# Patient Record
Sex: FEMALE | Race: WHITE | Employment: OTHER | URBAN - NONMETROPOLITAN AREA
[De-identification: names, ages, dates, MRNs, and addresses within clinical notes are randomized per-mention and may not be internally consistent; named-entity substitution may affect disease eponyms.]

---

## 2017-03-27 ENCOUNTER — TELEPHONE (OUTPATIENT)
Dept: PRIMARY CARE CLINIC | Age: 67
End: 2017-03-27

## 2017-03-27 RX ORDER — ONDANSETRON 4 MG/1
TABLET, FILM COATED ORAL
Qty: 30 TABLET | Refills: 1 | Status: SHIPPED | OUTPATIENT
Start: 2017-03-27 | End: 2019-07-25

## 2017-03-27 RX ORDER — MECLIZINE HYDROCHLORIDE 25 MG/1
TABLET ORAL
Qty: 30 TABLET | Refills: 5 | Status: SHIPPED | OUTPATIENT
Start: 2017-03-27 | End: 2018-01-04

## 2017-04-03 RX ORDER — BUTALBITAL, ACETAMINOPHEN AND CAFFEINE 50; 325; 40 MG/1; MG/1; MG/1
TABLET ORAL
Qty: 30 TABLET | Refills: 0 | Status: SHIPPED | OUTPATIENT
Start: 2017-04-03 | End: 2017-05-03 | Stop reason: SDUPTHER

## 2017-04-24 RX ORDER — LISINOPRIL 20 MG/1
TABLET ORAL
Qty: 30 TABLET | Refills: 5 | Status: SHIPPED | OUTPATIENT
Start: 2017-04-24 | End: 2017-10-13 | Stop reason: SDUPTHER

## 2017-05-03 RX ORDER — BUTALBITAL, ACETAMINOPHEN AND CAFFEINE 50; 325; 40 MG/1; MG/1; MG/1
TABLET ORAL
Qty: 30 TABLET | Refills: 0 | Status: CANCELLED | OUTPATIENT
Start: 2017-05-03

## 2017-05-03 RX ORDER — BUTALBITAL, ACETAMINOPHEN AND CAFFEINE 50; 325; 40 MG/1; MG/1; MG/1
TABLET ORAL
Qty: 30 TABLET | Refills: 2 | Status: SHIPPED | OUTPATIENT
Start: 2017-05-03 | End: 2017-05-08 | Stop reason: SDUPTHER

## 2017-05-05 RX ORDER — BUTALBITAL, ACETAMINOPHEN AND CAFFEINE 50; 325; 40 MG/1; MG/1; MG/1
TABLET ORAL
Qty: 30 TABLET | Refills: 0 | OUTPATIENT
Start: 2017-05-05

## 2017-05-08 RX ORDER — BUTALBITAL, ACETAMINOPHEN AND CAFFEINE 50; 325; 40 MG/1; MG/1; MG/1
TABLET ORAL
Qty: 30 TABLET | Refills: 2 | Status: SHIPPED | OUTPATIENT
Start: 2017-05-08 | End: 2017-09-26 | Stop reason: SDUPTHER

## 2017-06-23 RX ORDER — ZOLPIDEM TARTRATE 5 MG/1
TABLET ORAL
Qty: 30 TABLET | Refills: 3 | Status: SHIPPED | OUTPATIENT
Start: 2017-06-23 | End: 2017-12-04 | Stop reason: SDUPTHER

## 2017-09-27 RX ORDER — BUTALBITAL, ACETAMINOPHEN AND CAFFEINE 50; 325; 40 MG/1; MG/1; MG/1
TABLET ORAL
Qty: 30 TABLET | Refills: 0 | Status: SHIPPED | OUTPATIENT
Start: 2017-09-27 | End: 2017-10-23 | Stop reason: SDUPTHER

## 2017-10-13 RX ORDER — LISINOPRIL 20 MG/1
TABLET ORAL
Qty: 30 TABLET | Refills: 0 | Status: SHIPPED | OUTPATIENT
Start: 2017-10-13 | End: 2017-11-15 | Stop reason: SDUPTHER

## 2017-12-04 RX ORDER — ZOLPIDEM TARTRATE 5 MG/1
TABLET ORAL
Qty: 30 TABLET | Refills: 0 | Status: SHIPPED | OUTPATIENT
Start: 2017-12-04 | End: 2018-01-18 | Stop reason: SDUPTHER

## 2018-01-02 ENCOUNTER — NURSE ONLY (OUTPATIENT)
Dept: PRIMARY CARE CLINIC | Age: 68
End: 2018-01-02
Payer: MEDICARE

## 2018-01-02 DIAGNOSIS — I10 ESSENTIAL HYPERTENSION: Primary | ICD-10-CM

## 2018-01-02 DIAGNOSIS — Z11.59 ENCOUNTER FOR HEPATITIS C SCREENING TEST FOR LOW RISK PATIENT: ICD-10-CM

## 2018-01-02 DIAGNOSIS — D72.819 LEUKOPENIA, UNSPECIFIED TYPE: ICD-10-CM

## 2018-01-02 DIAGNOSIS — E78.9 ELEVATED SERUM CHOLESTEROL: ICD-10-CM

## 2018-01-02 DIAGNOSIS — E55.9 VITAMIN D DEFICIENCY: ICD-10-CM

## 2018-01-02 LAB
ALBUMIN SERPL-MCNC: 4.4 G/DL (ref 3.5–5.2)
ALP BLD-CCNC: 113 U/L (ref 35–104)
ALT SERPL-CCNC: 101 U/L (ref 5–33)
ANION GAP SERPL CALCULATED.3IONS-SCNC: 16 MMOL/L (ref 7–19)
AST SERPL-CCNC: 58 U/L (ref 5–32)
BILIRUB SERPL-MCNC: 0.4 MG/DL (ref 0.2–1.2)
BUN BLDV-MCNC: 19 MG/DL (ref 8–23)
CALCIUM SERPL-MCNC: 9.8 MG/DL (ref 8.8–10.2)
CHLORIDE BLD-SCNC: 100 MMOL/L (ref 98–111)
CHOLESTEROL, TOTAL: 303 MG/DL (ref 160–199)
CO2: 27 MMOL/L (ref 22–29)
CREAT SERPL-MCNC: 0.6 MG/DL (ref 0.5–0.9)
GFR NON-AFRICAN AMERICAN: >60
GLUCOSE BLD-MCNC: 102 MG/DL (ref 74–109)
HCT VFR BLD CALC: 49.8 % (ref 37–47)
HDLC SERPL-MCNC: 66 MG/DL (ref 65–121)
HEMOGLOBIN: 15.4 G/DL (ref 12–16)
LDL CHOLESTEROL CALCULATED: 208 MG/DL
MCH RBC QN AUTO: 29.8 PG (ref 27–31)
MCHC RBC AUTO-ENTMCNC: 30.9 G/DL (ref 33–37)
MCV RBC AUTO: 96.3 FL (ref 81–99)
PDW BLD-RTO: 12.3 % (ref 11.5–14.5)
PLATELET # BLD: 281 K/UL (ref 130–400)
PMV BLD AUTO: 9.4 FL (ref 9.4–12.3)
POTASSIUM SERPL-SCNC: 4.8 MMOL/L (ref 3.5–5)
RBC # BLD: 5.17 M/UL (ref 4.2–5.4)
SODIUM BLD-SCNC: 143 MMOL/L (ref 136–145)
TOTAL PROTEIN: 7.2 G/DL (ref 6.6–8.7)
TRIGL SERPL-MCNC: 147 MG/DL (ref 0–149)
VITAMIN D 25-HYDROXY: >60 NG/ML
WBC # BLD: 6 K/UL (ref 4.8–10.8)

## 2018-01-02 PROCEDURE — 36415 COLL VENOUS BLD VENIPUNCTURE: CPT | Performed by: FAMILY MEDICINE

## 2018-01-03 LAB — HEPATITIS C ANTIBODY INTERPRETATION: NORMAL

## 2018-01-04 ENCOUNTER — OFFICE VISIT (OUTPATIENT)
Dept: PRIMARY CARE CLINIC | Age: 68
End: 2018-01-04
Payer: MEDICARE

## 2018-01-04 VITALS
OXYGEN SATURATION: 98 % | DIASTOLIC BLOOD PRESSURE: 88 MMHG | HEART RATE: 81 BPM | RESPIRATION RATE: 16 BRPM | WEIGHT: 139.6 LBS | TEMPERATURE: 98 F | BODY MASS INDEX: 23.83 KG/M2 | SYSTOLIC BLOOD PRESSURE: 112 MMHG | HEIGHT: 64 IN

## 2018-01-04 DIAGNOSIS — E55.9 VITAMIN D DEFICIENCY: ICD-10-CM

## 2018-01-04 DIAGNOSIS — G89.29 CHRONIC NONINTRACTABLE HEADACHE, UNSPECIFIED HEADACHE TYPE: ICD-10-CM

## 2018-01-04 DIAGNOSIS — Z23 NEED FOR PNEUMOCOCCAL VACCINATION: ICD-10-CM

## 2018-01-04 DIAGNOSIS — R74.8 ELEVATED LIVER ENZYMES: ICD-10-CM

## 2018-01-04 DIAGNOSIS — I10 ESSENTIAL HYPERTENSION: Primary | ICD-10-CM

## 2018-01-04 DIAGNOSIS — R51.9 CHRONIC NONINTRACTABLE HEADACHE, UNSPECIFIED HEADACHE TYPE: ICD-10-CM

## 2018-01-04 DIAGNOSIS — F32.5 MAJOR DEPRESSION, SINGLE EPISODE, IN COMPLETE REMISSION (HCC): ICD-10-CM

## 2018-01-04 DIAGNOSIS — F51.04 CHRONIC INSOMNIA: ICD-10-CM

## 2018-01-04 DIAGNOSIS — E78.2 MIXED HYPERLIPIDEMIA: ICD-10-CM

## 2018-01-04 DIAGNOSIS — Z12.39 BREAST CANCER SCREENING: ICD-10-CM

## 2018-01-04 PROCEDURE — G8427 DOCREV CUR MEDS BY ELIG CLIN: HCPCS | Performed by: NURSE PRACTITIONER

## 2018-01-04 PROCEDURE — G8399 PT W/DXA RESULTS DOCUMENT: HCPCS | Performed by: NURSE PRACTITIONER

## 2018-01-04 PROCEDURE — G0009 ADMIN PNEUMOCOCCAL VACCINE: HCPCS | Performed by: NURSE PRACTITIONER

## 2018-01-04 PROCEDURE — 90732 PPSV23 VACC 2 YRS+ SUBQ/IM: CPT | Performed by: NURSE PRACTITIONER

## 2018-01-04 PROCEDURE — 1123F ACP DISCUSS/DSCN MKR DOCD: CPT | Performed by: NURSE PRACTITIONER

## 2018-01-04 PROCEDURE — 3014F SCREEN MAMMO DOC REV: CPT | Performed by: NURSE PRACTITIONER

## 2018-01-04 PROCEDURE — 3017F COLORECTAL CA SCREEN DOC REV: CPT | Performed by: NURSE PRACTITIONER

## 2018-01-04 PROCEDURE — 4040F PNEUMOC VAC/ADMIN/RCVD: CPT | Performed by: NURSE PRACTITIONER

## 2018-01-04 PROCEDURE — G8420 CALC BMI NORM PARAMETERS: HCPCS | Performed by: NURSE PRACTITIONER

## 2018-01-04 PROCEDURE — 99387 INIT PM E/M NEW PAT 65+ YRS: CPT | Performed by: NURSE PRACTITIONER

## 2018-01-04 PROCEDURE — 1090F PRES/ABSN URINE INCON ASSESS: CPT | Performed by: NURSE PRACTITIONER

## 2018-01-04 PROCEDURE — G8482 FLU IMMUNIZE ORDER/ADMIN: HCPCS | Performed by: NURSE PRACTITIONER

## 2018-01-04 PROCEDURE — 1036F TOBACCO NON-USER: CPT | Performed by: NURSE PRACTITIONER

## 2018-01-04 RX ORDER — BUTALBITAL, ACETAMINOPHEN AND CAFFEINE 50; 325; 40 MG/1; MG/1; MG/1
1 TABLET ORAL EVERY 6 HOURS PRN
Qty: 45 TABLET | Refills: 0 | Status: SHIPPED | OUTPATIENT
Start: 2018-01-04 | End: 2018-02-01 | Stop reason: SDUPTHER

## 2018-01-04 RX ORDER — M-VIT,TX,IRON,MINS/CALC/FOLIC 27MG-0.4MG
1 TABLET ORAL DAILY
COMMUNITY
End: 2022-04-11

## 2018-01-04 RX ORDER — ROSUVASTATIN CALCIUM 5 MG/1
5 TABLET, COATED ORAL NIGHTLY
Qty: 30 TABLET | Refills: 3 | Status: SHIPPED | OUTPATIENT
Start: 2018-01-04 | End: 2018-05-07 | Stop reason: SDUPTHER

## 2018-01-04 ASSESSMENT — ENCOUNTER SYMPTOMS
RESPIRATORY NEGATIVE: 1
EYES NEGATIVE: 1
GASTROINTESTINAL NEGATIVE: 1

## 2018-01-04 NOTE — PROGRESS NOTES
CHOLHDLRATIO  Past Medical History:   Diagnosis Date    Chronic insomnia     Colon polyps     Depression     Headache(784.0)     Hypertension     Leukocytopenia     Mononucleosis     Osteoarthritis     Solar keratosis     Status post right partial knee replacement 09/08/2014        Vitamin D deficiency       Past Surgical History:   Procedure Laterality Date    KNEE SURGERY Right September 8, 2014    Partial knee replacement: Dr Gricel Manley         Family History   Problem Relation Age of Onset    High Blood Pressure Mother     Cancer Mother      breast    High Blood Pressure Father     Cancer Brother      throat       Social History   Substance Use Topics    Smoking status: Never Smoker    Smokeless tobacco: Never Used    Alcohol use No      Current Outpatient Prescriptions   Medication Sig Dispense Refill    Multiple Vitamins-Minerals (THERAPEUTIC MULTIVITAMIN-MINERALS) tablet Take 1 tablet by mouth daily      rosuvastatin (CRESTOR) 5 MG tablet Take 1 tablet by mouth nightly 30 tablet 3    butalbital-acetaminophen-caffeine (FIORICET, ESGIC) -40 MG per tablet Take 1 tablet by mouth every 6 hours as needed for Headaches 45 tablet 0    zolpidem (AMBIEN) 5 MG tablet TAKE ONE TABLET BY MOUTH AT BEDTIME. 30 tablet 0    valACYclovir (VALTREX) 1 g tablet TAKE (2) TABLETS BY MOUTH AT ONSET OF FEVER BLISTER. MAY REPEAT ONCE IN 12 HOURS. 30 tablet 3    omeprazole (PRILOSEC) 40 MG delayed release capsule TAKE 1 CAPSULE BY MOUTH ONCE DAILY 30 capsule 5    ondansetron (ZOFRAN) 4 MG tablet 1 tablet by mouth every 8 hours as needed for nausea or vomiting 30 tablet 1    moexipril (UNIVASC) 15 MG tablet Take 15 mg by mouth nightly       No current facility-administered medications for this visit.       No Known Allergies    Health Maintenance   Topic Date Due    Potassium monitoring  1950    Creatinine monitoring  1950    DTaP/Tdap/Td vaccine (1 - Tdap) deficiency     6. Chronic insomnia     7. Chronic nonintractable headache, unspecified headache type         Plan:   She does not drink alcohol at all. Her liver enzymes are likely elevated from the butalbital and extra Tylenol and her elevated lipids. She is willing to try the Crestor. Patient given educational materials - see patient instructions. Discussed use, benefit, and side effects of prescribed medications. All patient questions answered. Pt voiced understanding. Reviewed health maintenance. Instructed to continue current medications, diet and exercise. Patient agreed with treatment plan. Follow up as directed. MEDICATIONS:  Orders Placed This Encounter   Medications    rosuvastatin (CRESTOR) 5 MG tablet     Sig: Take 1 tablet by mouth nightly     Dispense:  30 tablet     Refill:  3    butalbital-acetaminophen-caffeine (FIORICET, ESGIC) -40 MG per tablet     Sig: Take 1 tablet by mouth every 6 hours as needed for Headaches     Dispense:  45 tablet     Refill:  0         ORDERS:  Orders Placed This Encounter   Procedures    HENRY DIGITAL SCREEN W OR WO CAD BILATERAL    Pneumococcal polysaccharide vaccine 23-valent greater than or equal to 1yo subcutaneous/IM       Follow-up:  Return for 3-4 for labs only, 1 yr pe. PATIENT INSTRUCTIONS:  Patient Instructions   Watch intake of tylenol due to liver enzymes, use motrin,ibuprofen or aleve instead  May use the butalbital sparingly  Start the crestor every night. If side effects may change to lipitor or zetia.     Electronically signed by Zachary Peterson CNP on 1/4/2018 at 11:17 AM

## 2018-01-19 RX ORDER — ZOLPIDEM TARTRATE 5 MG/1
TABLET ORAL
Qty: 30 TABLET | Refills: 0 | Status: SHIPPED | OUTPATIENT
Start: 2018-01-19 | End: 2018-02-18

## 2018-01-22 ENCOUNTER — HOSPITAL ENCOUNTER (OUTPATIENT)
Dept: WOMENS IMAGING | Age: 68
Discharge: HOME | End: 2018-01-22
Payer: MEDICARE

## 2018-01-22 DIAGNOSIS — Z12.39 BREAST CANCER SCREENING: ICD-10-CM

## 2018-01-22 LAB — HEREDITARY CANCER TEST-MYRIAD: NORMAL

## 2018-01-22 PROCEDURE — 77063 BREAST TOMOSYNTHESIS BI: CPT

## 2018-01-22 PROCEDURE — 36415 COLL VENOUS BLD VENIPUNCTURE: CPT

## 2018-01-24 ENCOUNTER — TELEPHONE (OUTPATIENT)
Dept: WOMENS IMAGING | Age: 68
End: 2018-01-24

## 2018-01-26 ENCOUNTER — HOSPITAL ENCOUNTER (OUTPATIENT)
Dept: WOMENS IMAGING | Age: 68
Discharge: HOME | End: 2018-01-26
Payer: MEDICARE

## 2018-01-26 DIAGNOSIS — N64.89 BREAST ASYMMETRY: ICD-10-CM

## 2018-01-26 PROCEDURE — 77065 DX MAMMO INCL CAD UNI: CPT

## 2018-01-26 PROCEDURE — 76642 ULTRASOUND BREAST LIMITED: CPT

## 2018-02-02 RX ORDER — BUTALBITAL, ACETAMINOPHEN AND CAFFEINE 50; 325; 40 MG/1; MG/1; MG/1
TABLET ORAL
Qty: 45 TABLET | Refills: 0 | Status: SHIPPED | OUTPATIENT
Start: 2018-02-02 | End: 2018-02-27 | Stop reason: SDUPTHER

## 2018-02-13 ENCOUNTER — NURSE ONLY (OUTPATIENT)
Dept: PRIMARY CARE CLINIC | Age: 68
End: 2018-02-13
Payer: MEDICARE

## 2018-02-13 VITALS
HEART RATE: 92 BPM | WEIGHT: 144.6 LBS | BODY MASS INDEX: 24.69 KG/M2 | SYSTOLIC BLOOD PRESSURE: 130 MMHG | DIASTOLIC BLOOD PRESSURE: 80 MMHG | HEIGHT: 64 IN | OXYGEN SATURATION: 97 % | RESPIRATION RATE: 20 BRPM | TEMPERATURE: 97.7 F

## 2018-02-13 DIAGNOSIS — G43.009 MIGRAINE WITHOUT AURA AND WITHOUT STATUS MIGRAINOSUS, NOT INTRACTABLE: Primary | ICD-10-CM

## 2018-02-13 DIAGNOSIS — R51.9 NONINTRACTABLE HEADACHE, UNSPECIFIED CHRONICITY PATTERN, UNSPECIFIED HEADACHE TYPE: ICD-10-CM

## 2018-02-13 PROCEDURE — 99213 OFFICE O/P EST LOW 20 MIN: CPT | Performed by: FAMILY MEDICINE

## 2018-02-13 PROCEDURE — 96372 THER/PROPH/DIAG INJ SC/IM: CPT | Performed by: FAMILY MEDICINE

## 2018-02-13 RX ORDER — PROMETHAZINE HYDROCHLORIDE 25 MG/ML
25 INJECTION, SOLUTION INTRAMUSCULAR; INTRAVENOUS ONCE
Status: COMPLETED | OUTPATIENT
Start: 2018-02-13 | End: 2018-02-13

## 2018-02-13 RX ORDER — KETOROLAC TROMETHAMINE 30 MG/ML
60 INJECTION, SOLUTION INTRAMUSCULAR; INTRAVENOUS ONCE
Status: COMPLETED | OUTPATIENT
Start: 2018-02-13 | End: 2018-02-13

## 2018-02-13 RX ADMIN — KETOROLAC TROMETHAMINE 60 MG: 30 INJECTION, SOLUTION INTRAMUSCULAR; INTRAVENOUS at 16:07

## 2018-02-13 RX ADMIN — PROMETHAZINE HYDROCHLORIDE 25 MG: 25 INJECTION, SOLUTION INTRAMUSCULAR; INTRAVENOUS at 16:08

## 2018-02-13 ASSESSMENT — ENCOUNTER SYMPTOMS
EYE DISCHARGE: 0
EYE REDNESS: 0
SINUS PRESSURE: 0
RHINORRHEA: 0
COLOR CHANGE: 0
NAUSEA: 1
CHEST TIGHTNESS: 0
EYE ITCHING: 0
VOMITING: 0
DIARRHEA: 0
ABDOMINAL PAIN: 0
COUGH: 0
WHEEZING: 0

## 2018-02-14 NOTE — PROGRESS NOTES
and sinus pressure. Eyes: Negative for discharge, redness and itching. Respiratory: Negative for cough, chest tightness and wheezing. Cardiovascular: Negative for chest pain. Gastrointestinal: Positive for nausea. Negative for abdominal pain, diarrhea and vomiting. Genitourinary: Negative for decreased urine volume and difficulty urinating. Skin: Negative for color change and rash. Neurological: Positive for headaches. Negative for dizziness. Hematological: Does not bruise/bleed easily. OBJECTIVE:    Physical Exam   Constitutional: She is oriented to person, place, and time. She appears well-developed and well-nourished. HENT:   Head: Normocephalic and atraumatic. Mouth/Throat: Uvula is midline, oropharynx is clear and moist and mucous membranes are normal.   Eyes: Conjunctivae, EOM and lids are normal.   Fundoscopic exam:       The right eye shows no hemorrhage. The left eye shows no hemorrhage. Cardiovascular: Normal rate, regular rhythm and intact distal pulses. Exam reveals no gallop and no friction rub. No murmur heard. Pulmonary/Chest: Effort normal and breath sounds normal. No respiratory distress. Neurological: She is alert and oriented to person, place, and time. Skin: Skin is warm and dry. No rash noted. Psychiatric: She has a normal mood and affect. Her behavior is normal. Judgment and thought content normal.      /80 (Site: Left Arm, Position: Sitting, Cuff Size: Medium Adult)   Pulse 92   Temp 97.7 °F (36.5 °C) (Temporal)   Resp 20   Ht 5' 4\" (1.626 m)   Wt 144 lb 9.6 oz (65.6 kg)   SpO2 97%   BMI 24.82 kg/m²      ASSESSMENT:    Eyad was seen today for headache.     Diagnoses and all orders for this visit:    Migraine without aura and without status migrainosus, not intractable    Nonintractable headache, unspecified chronicity pattern, unspecified headache type  -     ketorolac (TORADOL) injection 60 mg; Inject 2 mLs into the muscle once  -

## 2018-02-22 ENCOUNTER — TELEPHONE (OUTPATIENT)
Dept: PRIMARY CARE CLINIC | Age: 68
End: 2018-02-22

## 2018-02-22 DIAGNOSIS — R51.9 CHRONIC NONINTRACTABLE HEADACHE, UNSPECIFIED HEADACHE TYPE: Primary | ICD-10-CM

## 2018-02-22 DIAGNOSIS — G89.29 CHRONIC NONINTRACTABLE HEADACHE, UNSPECIFIED HEADACHE TYPE: Primary | ICD-10-CM

## 2018-02-22 RX ORDER — ZOLPIDEM TARTRATE 5 MG/1
TABLET ORAL
Qty: 30 TABLET | Refills: 2 | Status: SHIPPED | OUTPATIENT
Start: 2018-02-22 | End: 2018-03-24

## 2018-02-27 RX ORDER — BUTALBITAL, ACETAMINOPHEN AND CAFFEINE 50; 325; 40 MG/1; MG/1; MG/1
1 TABLET ORAL EVERY 6 HOURS PRN
Qty: 45 TABLET | Refills: 0 | Status: SHIPPED | OUTPATIENT
Start: 2018-02-27 | End: 2018-04-25 | Stop reason: SDUPTHER

## 2018-02-28 RX ORDER — MULTIPLE VITAMINS W/ MINERALS TAB 9MG-400MCG
1 TAB ORAL DAILY
COMMUNITY
End: 2022-08-24 | Stop reason: SDDI

## 2018-02-28 RX ORDER — ZOLPIDEM TARTRATE 5 MG/1
5 TABLET ORAL
COMMUNITY

## 2018-02-28 RX ORDER — BUTALBITAL, ACETAMINOPHEN AND CAFFEINE 50; 325; 40 MG/1; MG/1; MG/1
1 TABLET ORAL EVERY 4 HOURS PRN
COMMUNITY

## 2018-02-28 RX ORDER — ONDANSETRON 4 MG/1
4 TABLET, FILM COATED ORAL EVERY 8 HOURS PRN
COMMUNITY
End: 2022-08-24 | Stop reason: SDDI

## 2018-02-28 RX ORDER — OMEPRAZOLE 40 MG/1
40 CAPSULE, DELAYED RELEASE ORAL AS NEEDED
COMMUNITY

## 2018-02-28 RX ORDER — ROSUVASTATIN CALCIUM 5 MG/1
5 TABLET, COATED ORAL DAILY
COMMUNITY
End: 2018-07-12

## 2018-02-28 RX ORDER — MOEXIPRIL HCL 15 MG
15 TABLET ORAL NIGHTLY
COMMUNITY
End: 2020-07-09 | Stop reason: ALTCHOICE

## 2018-02-28 RX ORDER — VALACYCLOVIR HYDROCHLORIDE 500 MG/1
1000 TABLET, FILM COATED ORAL 2 TIMES DAILY PRN
COMMUNITY

## 2018-03-12 RX ORDER — RIZATRIPTAN BENZOATE 10 MG/1
10 TABLET ORAL
Qty: 10 TABLET | Refills: 3 | Status: SHIPPED | OUTPATIENT
Start: 2018-03-12 | End: 2019-01-07

## 2018-03-14 ENCOUNTER — TELEPHONE (OUTPATIENT)
Dept: PRIMARY CARE CLINIC | Age: 68
End: 2018-03-14

## 2018-03-14 NOTE — TELEPHONE ENCOUNTER
Bryanna Mckeon MD  [facility]   Caller: Unspecified (2 days ago,  1:42 PM)             Please call patient and tell her I sent in another migraine drug for her to try. They actually wanted her to consider taking Topamax daily in order to try to prevent her headaches. I would suggest that we try generic Maxalt but this is to be taken when she has a headache. If she wants me to start her on Topamax I would be happy to do so. If she continues to take the Esgic-Plus we will have to see her every 6 months because it is now a controlled substance. Please document this conversation. She has tried Imitrex but it did not work. Attempted to contact the Pt several times in the Past two days, unavailable, left VM . Spoke to PT in regards to the above message from Dr. Irish Vincent. PT states that she is using the Maxalt and it is helping a little bit. PT stated that she has an appointment with a neurologist on 3- and she was only given a few pills, but she has enough to last her until after her appointment and she will see what the Neurologist says first and will give us a call back.

## 2018-03-20 ENCOUNTER — OFFICE VISIT (OUTPATIENT)
Dept: NEUROLOGY | Facility: CLINIC | Age: 68
End: 2018-03-20

## 2018-03-20 VITALS
WEIGHT: 140 LBS | BODY MASS INDEX: 23.9 KG/M2 | HEIGHT: 64 IN | SYSTOLIC BLOOD PRESSURE: 108 MMHG | HEART RATE: 70 BPM | DIASTOLIC BLOOD PRESSURE: 72 MMHG | RESPIRATION RATE: 18 BRPM

## 2018-03-20 DIAGNOSIS — R51.9 CHRONIC DAILY HEADACHE: ICD-10-CM

## 2018-03-20 DIAGNOSIS — G43.719 INTRACTABLE CHRONIC MIGRAINE WITHOUT AURA AND WITHOUT STATUS MIGRAINOSUS: Primary | ICD-10-CM

## 2018-03-20 DIAGNOSIS — R26.89 IMBALANCE: ICD-10-CM

## 2018-03-20 DIAGNOSIS — I10 ESSENTIAL (PRIMARY) HYPERTENSION: ICD-10-CM

## 2018-03-20 PROCEDURE — 99204 OFFICE O/P NEW MOD 45 MIN: CPT | Performed by: PSYCHIATRY & NEUROLOGY

## 2018-03-20 NOTE — PROGRESS NOTES
[facility], 1950, is a female who is being seen today for   Chief Complaint   Patient presents with   • Migraine       HISTORY OF PRESENT ILLNESS: Patient seen for headache.  Patient is had migraines for 25 years.  Patient said the headaches got worse before menopause in her 40s.  Patient has positive family history of 2 daughters with migraine.  Patient has tried Topamax several years ago but did not help.  Imitrex did not help.  She took some Maxalt recently which may have helped some.  Patient was taking butalbital daily and sometimes more than that and is a heavy caffeine abuser.  Patient's had recent elevation of liver enzymes and hepatitis screen that was negative.  Patient's headaches are usually behind the eyes and radiates occipitally.  She frequently wakes up with a headache in the morning.  Patient says that food sometimes helps the headaches.  Patient is never had any lower high blood sugars.  Patient has a history of hypertension on medication.  Patient never had any head trauma.  Patient with the headaches sometimes has some nausea and tunnel vision and intermittently has some dizziness lasting for a few seconds.  Patient is had bilateral knee replacements.  Patient had some type of the scan of the brain more than 8 years ago.  Patient is had CBC showing no anemia but low MCV.  Most recent AST 58 and  in January 2008.  Patient is had leukocytopenia.  Patient has a history of insomnia.  Also according to notes history of depression.  She has never been on antidepressant medication.    REVIEW OF SYSTEMS:   GENERAL: Blood pressure 100/62 sitting and 108/72 standing.  PULMONARY: No acute shortness of breath  CVS:  No chest pain or palpitation  GASTROINTESTINAL: As above  GENITOURINARY: No acute  distress  GYN: No acute GYN distress  MUSCULOSKELETAL: As above  HEENT:  As above  ENDOCRINE:  No acute endocrine symptoms  PSYCHIATRIC: As above  HEMATOLOGY: As above  SKIN: No acute  skin changes  Family history reviewed as above and otherwise noncontributory  Social history: Patient denies smoking or drug or alcohol abuse    PHYSICAL EXAMINATION:    GENERAL: No acute distress other than the headache.  No bruits over the eyes  CRANIUM: Normocephalic/atraumatic/atraumatic and no specific tenderness over the cranium.  HEENT: No acute fundic abnormalities.  Patient has corneal implants.  EOMs intact without nystagmus and fields full to confrontation       EYES: Pupils equal round reactive to light.         EARS:  Tympanic membranes normal hears tuning fork bilaterally       THROAT: No oropharynx abnormalities       NECK:  No bruits/no lymphadenopathy  CHEST: No acute cardiopulmonary abnormalities by auscultation  ABDOMEN: Nondistended  EXTREMITIES: Pulses symmetrical  NEURO: Patient alert and follows commands without difficulty  SPEECH:  Speech normal    CRANIAL NERVES:  Motor sensory about the face normal and symmetric    MOTOR STRENGTH:  Motor strength upper and lower extremities normal  STATION AND GAIT:  Gait normal/Romberg negative  CEREBELLAR:  Finger-nose and heel shin normal  SENSORY:  Normal pin and vibration upper and lower extremities  REFLEXES:  Reflexes present and symmetric upper and lower extremities without Babinski's      ASSESSMENT AND PLAN:  I discussed with the patient in detail the problems with rebound headaches and her caffeine use and butalbital use and she is to try to taper those without stopping them suddenly.  We will be doing MRI brain noncontrast and EEG and further blood work.  Patient to get back with PCP about tapering the butalbital. Discussed the patient's BMI with her. BMI is within normal parameters. No follow-up required.      Vincent was seen today for migraine.    Diagnoses and all orders for this visit:    Intractable chronic migraine without aura and without status migrainosus  -     Comprehensive Metabolic Panel; Future  -     EEG; Future  -     Lipid  Panel; Future  -     Magnesium; Future  -     MRI Brain Without Contrast; Future  -     Sedimentation Rate; Future  -     T4, Free; Future  -     Vitamin B12; Future  -     Folate; Future    Chronic daily headache    Imbalance  -     US Carotid Bilateral; Future    Essential (primary) hypertension   -     Lipid Panel; Future  -     T4, Free; Future    Other orders  -     Cancel: Adult Transthoracic Echo Complete W/ Cont if Necessary Per Protocol; Future  -     Cancel: CBC & Differential; Future

## 2018-03-20 NOTE — PATIENT INSTRUCTIONS
Patient to get back with PCP about elevated liver enzymes and follow-up.  Patient to work with PCP about tapering caffeine /butalbital use

## 2018-03-22 ENCOUNTER — LAB (OUTPATIENT)
Dept: LAB | Facility: HOSPITAL | Age: 68
End: 2018-03-22
Attending: PSYCHIATRY & NEUROLOGY

## 2018-03-22 DIAGNOSIS — G43.719 INTRACTABLE CHRONIC MIGRAINE WITHOUT AURA AND WITHOUT STATUS MIGRAINOSUS: ICD-10-CM

## 2018-03-22 DIAGNOSIS — I10 ESSENTIAL (PRIMARY) HYPERTENSION: ICD-10-CM

## 2018-03-22 LAB
ALBUMIN SERPL-MCNC: 4.2 G/DL (ref 3.5–5)
ALBUMIN/GLOB SERPL: 1.5 G/DL (ref 1.1–2.5)
ALP SERPL-CCNC: 93 U/L (ref 24–120)
ALT SERPL W P-5'-P-CCNC: 36 U/L (ref 0–54)
ANION GAP SERPL CALCULATED.3IONS-SCNC: 9 MMOL/L (ref 4–13)
ARTICHOKE IGE QN: 97 MG/DL (ref 0–99)
AST SERPL-CCNC: 27 U/L (ref 7–45)
BILIRUB SERPL-MCNC: 0.4 MG/DL (ref 0.1–1)
BUN BLD-MCNC: 19 MG/DL (ref 5–21)
BUN/CREAT SERPL: 26 (ref 7–25)
CALCIUM SPEC-SCNC: 9.7 MG/DL (ref 8.4–10.4)
CHLORIDE SERPL-SCNC: 99 MMOL/L (ref 98–110)
CHOLEST SERPL-MCNC: 187 MG/DL (ref 130–200)
CO2 SERPL-SCNC: 33 MMOL/L (ref 24–31)
CREAT BLD-MCNC: 0.73 MG/DL (ref 0.5–1.4)
ERYTHROCYTE [SEDIMENTATION RATE] IN BLOOD: <1 MM/HR (ref 0–20)
FOLATE SERPL-MCNC: >20 NG/ML
GFR SERPL CREATININE-BSD FRML MDRD: 80 ML/MIN/1.73
GLOBULIN UR ELPH-MCNC: 2.8 GM/DL
GLUCOSE BLD-MCNC: 100 MG/DL (ref 70–100)
HDLC SERPL-MCNC: 57 MG/DL
LDLC/HDLC SERPL: 1.68 {RATIO}
MAGNESIUM SERPL-MCNC: 2.2 MG/DL (ref 1.4–2.2)
POTASSIUM BLD-SCNC: 5.1 MMOL/L (ref 3.5–5.3)
PROT SERPL-MCNC: 7 G/DL (ref 6.3–8.7)
SODIUM BLD-SCNC: 141 MMOL/L (ref 135–145)
T4 FREE SERPL-MCNC: 1.07 NG/DL (ref 0.78–2.19)
TRIGL SERPL-MCNC: 170 MG/DL (ref 0–149)
VIT B12 BLD-MCNC: 923 PG/ML (ref 239–931)

## 2018-03-22 PROCEDURE — 80053 COMPREHEN METABOLIC PANEL: CPT | Performed by: PSYCHIATRY & NEUROLOGY

## 2018-03-22 PROCEDURE — 83735 ASSAY OF MAGNESIUM: CPT | Performed by: PSYCHIATRY & NEUROLOGY

## 2018-03-22 PROCEDURE — 85651 RBC SED RATE NONAUTOMATED: CPT | Performed by: PSYCHIATRY & NEUROLOGY

## 2018-03-22 PROCEDURE — 82607 VITAMIN B-12: CPT | Performed by: PSYCHIATRY & NEUROLOGY

## 2018-03-22 PROCEDURE — 84439 ASSAY OF FREE THYROXINE: CPT | Performed by: PSYCHIATRY & NEUROLOGY

## 2018-03-22 PROCEDURE — 36415 COLL VENOUS BLD VENIPUNCTURE: CPT

## 2018-03-22 PROCEDURE — 82746 ASSAY OF FOLIC ACID SERUM: CPT | Performed by: PSYCHIATRY & NEUROLOGY

## 2018-03-22 PROCEDURE — 80061 LIPID PANEL: CPT | Performed by: PSYCHIATRY & NEUROLOGY

## 2018-03-26 ENCOUNTER — HOSPITAL ENCOUNTER (OUTPATIENT)
Dept: NEUROLOGY | Facility: HOSPITAL | Age: 68
Discharge: HOME | End: 2018-03-26
Attending: PSYCHIATRY & NEUROLOGY | Admitting: PSYCHIATRY & NEUROLOGY

## 2018-03-26 DIAGNOSIS — G43.719 INTRACTABLE CHRONIC MIGRAINE WITHOUT AURA AND WITHOUT STATUS MIGRAINOSUS: ICD-10-CM

## 2018-03-26 PROCEDURE — 95816 EEG AWAKE AND DROWSY: CPT | Performed by: PSYCHIATRY & NEUROLOGY

## 2018-03-26 PROCEDURE — 95816 EEG AWAKE AND DROWSY: CPT

## 2018-04-25 ENCOUNTER — TELEPHONE (OUTPATIENT)
Dept: PRIMARY CARE CLINIC | Age: 68
End: 2018-04-25

## 2018-04-25 RX ORDER — BUTALBITAL, ACETAMINOPHEN AND CAFFEINE 50; 325; 40 MG/1; MG/1; MG/1
TABLET ORAL
Qty: 45 TABLET | Refills: 0 | Status: SHIPPED | OUTPATIENT
Start: 2018-04-25 | End: 2018-06-29 | Stop reason: SDUPTHER

## 2018-05-08 ENCOUNTER — HOSPITAL ENCOUNTER (OUTPATIENT)
Dept: ULTRASOUND IMAGING | Facility: HOSPITAL | Age: 68
Discharge: HOME | End: 2018-05-08
Attending: PSYCHIATRY & NEUROLOGY

## 2018-05-08 ENCOUNTER — HOSPITAL ENCOUNTER (OUTPATIENT)
Dept: MRI IMAGING | Facility: HOSPITAL | Age: 68
Discharge: HOME | End: 2018-05-08
Attending: PSYCHIATRY & NEUROLOGY | Admitting: PSYCHIATRY & NEUROLOGY

## 2018-05-08 DIAGNOSIS — R26.89 IMBALANCE: ICD-10-CM

## 2018-05-08 DIAGNOSIS — G43.719 INTRACTABLE CHRONIC MIGRAINE WITHOUT AURA AND WITHOUT STATUS MIGRAINOSUS: ICD-10-CM

## 2018-05-08 PROCEDURE — 93880 EXTRACRANIAL BILAT STUDY: CPT | Performed by: SURGERY

## 2018-05-08 PROCEDURE — 70551 MRI BRAIN STEM W/O DYE: CPT

## 2018-05-08 PROCEDURE — 93880 EXTRACRANIAL BILAT STUDY: CPT

## 2018-05-08 RX ORDER — ROSUVASTATIN CALCIUM 5 MG/1
TABLET, COATED ORAL
Qty: 30 TABLET | Refills: 5 | Status: SHIPPED | OUTPATIENT
Start: 2018-05-08 | End: 2018-07-09 | Stop reason: ALTCHOICE

## 2018-05-15 ENCOUNTER — OFFICE VISIT (OUTPATIENT)
Dept: NEUROLOGY | Facility: CLINIC | Age: 68
End: 2018-05-15

## 2018-05-15 VITALS
BODY MASS INDEX: 23.56 KG/M2 | DIASTOLIC BLOOD PRESSURE: 70 MMHG | WEIGHT: 138 LBS | HEIGHT: 64 IN | HEART RATE: 72 BPM | SYSTOLIC BLOOD PRESSURE: 118 MMHG

## 2018-05-15 DIAGNOSIS — G89.29 CHRONIC INTRACTABLE HEADACHE, UNSPECIFIED HEADACHE TYPE: Primary | ICD-10-CM

## 2018-05-15 DIAGNOSIS — R51.9 CHRONIC INTRACTABLE HEADACHE, UNSPECIFIED HEADACHE TYPE: Primary | ICD-10-CM

## 2018-05-15 DIAGNOSIS — G44.40 MEDICATION OVERUSE HEADACHE: ICD-10-CM

## 2018-05-15 PROCEDURE — 99214 OFFICE O/P EST MOD 30 MIN: CPT | Performed by: CLINICAL NURSE SPECIALIST

## 2018-05-15 RX ORDER — TOPIRAMATE 50 MG/1
50 CAPSULE, EXTENDED RELEASE ORAL NIGHTLY
Qty: 7 CAPSULE | Refills: 0 | COMMUNITY
Start: 2018-05-15 | End: 2018-05-25 | Stop reason: SDUPTHER

## 2018-05-15 RX ORDER — TOPIRAMATE 25 MG/1
25 CAPSULE, EXTENDED RELEASE ORAL NIGHTLY
Qty: 7 CAPSULE | Refills: 0 | COMMUNITY
Start: 2018-05-15 | End: 2018-06-28

## 2018-05-15 RX ORDER — TOPIRAMATE 50 MG/1
50 CAPSULE, EXTENDED RELEASE ORAL NIGHTLY
Qty: 30 CAPSULE | Refills: 2 | Status: SHIPPED | OUTPATIENT
Start: 2018-05-15 | End: 2018-06-28

## 2018-05-15 NOTE — PROGRESS NOTES
Subjective     Chief Complaint   Patient presents with   • Migraine     No change in migraines. She has decreased her fioricet using like asked.        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 She was last seen by Dr. Mike 3/20/18. Since then she has significantly reduced the amount of fioricet she is taking as she was taking daily and sometimes multiple times per day. She has also cut back on the amount of caffeine but does drink one 12 oz Mt. Dew daily. She continues to take ibuprofen 4 times daily. She did have MRI brain, EEG, CUS, and labs. I have reviewed results with patient.     Migraine    This is a chronic (saw a neurologist about 10 years ago) problem. The current episode started more than 1 year ago (since early 40s). The problem occurs daily (wake with the HA and usually gone by 1500). The pain is located in the bilateral, occipital and parietal region. The pain does not radiate. The pain quality is similar to prior headaches. The quality of the pain is described as dull. The pain is at a severity of 6/10. The pain is severe. Associated symptoms include nausea. Pertinent negatives include no coughing, dizziness, phonophobia, photophobia or vomiting. Associated symptoms comments: Tunnel vision. The symptoms are aggravated by hunger (was caffiene abuser but still has 12 oz Mt. Dew daily). She has tried acetaminophen, NSAIDs and cold packs (food will make temporarily go away, fioricet, ibuprofen 4 times a day, topamax, imitrex) for the symptoms. Her past medical history is significant for hypertension and migraines in the family (mother, daughter).        Current Outpatient Prescriptions   Medication Sig Dispense Refill   • butalbital-acetaminophen-caffeine (FIORICET, ESGIC) -40 MG per tablet Take 1 tablet by mouth Every 4 (Four) Hours As Needed for Headache.     • Cholecalciferol (VITAMIN D PO) Take  by mouth Daily.     •  Lactobacillus (ACIDOPHILUS PO) Take  by mouth Daily.     • LYSINE PO Take  by mouth Daily.     • MAGNESIUM PO Take  by mouth Daily.     • moexipril (UNIVASC) 15 MG tablet Take 15 mg by mouth Every Night.     • Multiple Vitamins-Minerals (MULTIVITAMIN WITH MINERALS) tablet tablet Take 1 tablet by mouth Daily.     • omeprazole (priLOSEC) 40 MG capsule Take 40 mg by mouth As Needed.     • ondansetron (ZOFRAN) 4 MG tablet Take 4 mg by mouth Every 8 (Eight) Hours As Needed for Nausea or Vomiting.     • rosuvastatin (CRESTOR) 5 MG tablet Take 5 mg by mouth Daily.     • valACYclovir (VALTREX) 500 MG tablet Take 1,000 mg by mouth 2 (Two) Times a Day As Needed.     • zolpidem (AMBIEN) 5 MG tablet Take 5 mg by mouth. Take one half tab at bedtime     • Topiramate ER (TROKENDI XR) 25 MG capsule sustained-release 24 hr Take 25 mg by mouth Every Night. 7 capsule 0   • Topiramate ER (TROKENDI XR) 50 MG capsule sustained-release 24 hr Take 50 mg by mouth Every Night. 7 capsule 0   • Topiramate ER (TROKENDI XR) 50 MG capsule sustained-release 24 hr Take 50 mg by mouth Every Night. 30 capsule 2     No current facility-administered medications for this visit.        Past Medical History:   Diagnosis Date   • Headache    • Migraine        Past Surgical History:   Procedure Laterality Date   • KNEE SURGERY         family history includes No Known Problems in her father and mother.    Social History   Substance Use Topics   • Smoking status: Never Smoker   • Smokeless tobacco: Never Used   • Alcohol use No       Review of Systems   Constitutional: Negative.  Negative for fatigue.   HENT: Negative.    Eyes: Negative.  Negative for photophobia.   Respiratory: Negative.  Negative for apnea and cough.    Cardiovascular: Negative.    Gastrointestinal: Positive for nausea. Negative for vomiting.   Endocrine: Negative.    Genitourinary: Negative.  Negative for dysuria and frequency.   Musculoskeletal: Negative for arthralgias, gait problem and  "myalgias.        Hx of bilateral knee replacment.   Skin: Negative.    Allergic/Immunologic: Negative.    Neurological: Negative.  Negative for dizziness.   Hematological: Negative.    Psychiatric/Behavioral: Negative.  Negative for agitation and confusion.   All other systems reviewed and are negative.      Objective     /70   Pulse 72   Ht 162.6 cm (64\")   Wt 62.6 kg (138 lb)   BMI 23.69 kg/m² , Body mass index is 23.69 kg/m².    Physical Exam   Constitutional: She is oriented to person, place, and time. Vital signs are normal. She appears well-developed and well-nourished. She is cooperative.   HENT:   Head: Normocephalic and atraumatic.   Right Ear: External ear normal.   Left Ear: External ear normal.   Nose: Nose normal.   Mouth/Throat: Oropharynx is clear and moist.   Eyes: Conjunctivae, EOM and lids are normal. Pupils are equal, round, and reactive to light. Right eye exhibits normal extraocular motion and no nystagmus. Left eye exhibits normal extraocular motion and no nystagmus. Right pupil is round and reactive. Left pupil is round and reactive. Pupils are equal.   Neck: Normal range of motion. Neck supple. Carotid bruit is not present.   Cardiovascular: Normal rate, regular rhythm and normal heart sounds.    No murmur heard.  Pulmonary/Chest: Effort normal and breath sounds normal. She has no decreased breath sounds. She has no wheezes. She has no rhonchi. She has no rales.   Abdominal: Soft. Bowel sounds are normal.   Musculoskeletal: Normal range of motion.   Neurological: She is alert and oriented to person, place, and time. She has normal strength and normal reflexes. She displays no tremor. No cranial nerve deficit or sensory deficit. She exhibits normal muscle tone. She displays a negative Romberg sign. Coordination and gait normal.   Reflex Scores:       Tricep reflexes are 2+ on the right side and 2+ on the left side.       Bicep reflexes are 2+ on the right side and 2+ on the left " side.       Brachioradialis reflexes are 2+ on the right side and 2+ on the left side.       Patellar reflexes are 2+ on the right side and 2+ on the left side.       Achilles reflexes are 2+ on the right side and 2+ on the left side.  Awake, alert. No aphasia, no dysarthria  Completes simple and complex commands    CN II:  Visual fields full.  Pupils equally reactive to light  CN III, IV, VI:  Extraocular Muscles full with no signs of nystagmus  CN V:  Facial sensory is symmetric with no asymetries.  CN VII:  Facial motor symmetric  CN VIII:  Gross hearing intact bilaterally  CN IX:  Palate elevates symmetrically  CN X:  Palate elevates symmetrically  CN XI:  Shoulder shrug symmetric  CN XII:  Tongue is midline on protrusion    Full and symmetric strength bilateral upper and lower extremities.   Skin: Skin is warm and dry.   Psychiatric: She has a normal mood and affect. Her speech is normal and behavior is normal. Cognition and memory are normal.   Nursing note and vitals reviewed.      Results for orders placed or performed in visit on 03/22/18   Comprehensive Metabolic Panel   Result Value Ref Range    Glucose 100 70 - 100 mg/dL    BUN 19 5 - 21 mg/dL    Creatinine 0.73 0.50 - 1.40 mg/dL    Sodium 141 135 - 145 mmol/L    Potassium 5.1 3.5 - 5.3 mmol/L    Chloride 99 98 - 110 mmol/L    CO2 33.0 (H) 24.0 - 31.0 mmol/L    Calcium 9.7 8.4 - 10.4 mg/dL    Total Protein 7.0 6.3 - 8.7 g/dL    Albumin 4.20 3.50 - 5.00 g/dL    ALT (SGPT) 36 0 - 54 U/L    AST (SGOT) 27 7 - 45 U/L    Alkaline Phosphatase 93 24 - 120 U/L    Total Bilirubin 0.4 0.1 - 1.0 mg/dL    eGFR Non African Amer 80 >60 mL/min/1.73    Globulin 2.8 gm/dL    A/G Ratio 1.5 1.1 - 2.5 g/dL    BUN/Creatinine Ratio 26.0 (H) 7.0 - 25.0    Anion Gap 9.0 4.0 - 13.0 mmol/L   Lipid Panel   Result Value Ref Range    Total Cholesterol 187 130 - 200 mg/dL    Triglycerides 170 (H) 0 - 149 mg/dL    HDL Cholesterol 57 >=50 mg/dL    LDL Cholesterol  97 0 - 99 mg/dL     LDL/HDL Ratio 1.68    Magnesium   Result Value Ref Range    Magnesium 2.2 1.4 - 2.2 mg/dL   Sedimentation Rate   Result Value Ref Range    Sed Rate <1 0 - 20 mm/hr   T4, Free   Result Value Ref Range    Free T4 1.07 0.78 - 2.19 ng/dL   Vitamin B12   Result Value Ref Range    Vitamin B-12 923 239 - 931 pg/mL   Folate   Result Value Ref Range    Folate >20.00 >2.75 ng/mL        EEG: IMPRESSION:  Normal EEG awake and asleep with some artifact noted as above     CAROTID DUPLEX: IMPRESSION:  Impression:  1. There is less than 50% stenosis of the right internal carotid artery.  2. There is less than 50% stenosis of the left internal carotid artery.  3. Antegrade flow is demonstrated in bilateral vertebral arteries.    MRI BRAIN: IMPRESSION:  1. No acute signs of ischemia, hemorrhage, or mass.  2. Nonspecific hyperintense FLAIR signal changes may relate to chronic  small vessel ischemia or sequela migraine headaches. Demyelinating  process is thought to be unlikely.      ASSESSMENT/PLAN    Diagnoses and all orders for this visit:    Chronic intractable headache, unspecified headache type    Medication overuse headache    Other orders  -     LYSINE PO; Take  by mouth Daily.  -     Cholecalciferol (VITAMIN D PO); Take  by mouth Daily.  -     MAGNESIUM PO; Take  by mouth Daily.  -     Lactobacillus (ACIDOPHILUS PO); Take  by mouth Daily.  -     Topiramate ER (TROKENDI XR) 25 MG capsule sustained-release 24 hr; Take 25 mg by mouth Every Night.  -     Topiramate ER (TROKENDI XR) 50 MG capsule sustained-release 24 hr; Take 50 mg by mouth Every Night.  -     Topiramate ER (TROKENDI XR) 50 MG capsule sustained-release 24 hr; Take 50 mg by mouth Every Night.      MEDICAL DECISION MAKIN. Patient to discontinue NSAIDS for minimum of 6 weeks.  2. Will trial Trokendi XR 25 mg daily for 7 days then 50 mg daily thereafter. Counseled on side effects.  3. Consider Elavil or gabapentin if does not tolerate Trokendi XR. Consider BUtter  dora. may also consider BOTOX   4. Patient's Body mass index is 23.69 kg/m². BMI is within normal parameters. No follow-up required.  5. Patient to use fioricet sparingly.  6. At least 25 minutes spent in coordination of care and answering questions.   7. Fall Risk Assessment  Fallen in past 6 months: 0--> No  Mental Status: 0--> no mental status change  Mobility: 0--> No mobility issues  Medications: 1--> Narcotics  Total Fall Risk Score: 2     allergies and all known medications/prescriptions have been reviewed using resources available on this encounter.    Return in about 6 weeks (around 6/26/2018).        Era Early, APRN

## 2018-05-25 RX ORDER — TOPIRAMATE 50 MG/1
50 CAPSULE, EXTENDED RELEASE ORAL NIGHTLY
Qty: 14 CAPSULE | Refills: 0 | COMMUNITY
Start: 2018-05-25 | End: 2018-06-28

## 2018-06-28 ENCOUNTER — TELEPHONE (OUTPATIENT)
Dept: NEUROLOGY | Facility: CLINIC | Age: 68
End: 2018-06-28

## 2018-06-28 RX ORDER — TOPIRAMATE 100 MG/1
100 CAPSULE, EXTENDED RELEASE ORAL NIGHTLY
Qty: 30 CAPSULE | Refills: 2 | Status: SHIPPED | OUTPATIENT
Start: 2018-06-28 | End: 2018-08-30 | Stop reason: DRUGHIGH

## 2018-06-28 NOTE — TELEPHONE ENCOUNTER
Vincent said Cha was working until 5 or 6 days ago.  She wanted to know if you would increase her dose.

## 2018-07-09 ENCOUNTER — OFFICE VISIT (OUTPATIENT)
Dept: PRIMARY CARE CLINIC | Age: 68
End: 2018-07-09
Payer: MEDICARE

## 2018-07-09 VITALS
HEIGHT: 64 IN | OXYGEN SATURATION: 97 % | DIASTOLIC BLOOD PRESSURE: 70 MMHG | HEART RATE: 85 BPM | WEIGHT: 133.8 LBS | RESPIRATION RATE: 18 BRPM | TEMPERATURE: 97.4 F | SYSTOLIC BLOOD PRESSURE: 110 MMHG | BODY MASS INDEX: 22.84 KG/M2

## 2018-07-09 DIAGNOSIS — G43.109 MIGRAINE WITH AURA AND WITHOUT STATUS MIGRAINOSUS, NOT INTRACTABLE: ICD-10-CM

## 2018-07-09 DIAGNOSIS — F51.04 CHRONIC INSOMNIA: Primary | ICD-10-CM

## 2018-07-09 DIAGNOSIS — I10 ESSENTIAL HYPERTENSION: ICD-10-CM

## 2018-07-09 DIAGNOSIS — D72.819 LEUKOPENIA, UNSPECIFIED TYPE: ICD-10-CM

## 2018-07-09 DIAGNOSIS — E55.9 VITAMIN D DEFICIENCY: ICD-10-CM

## 2018-07-09 LAB
ALBUMIN SERPL-MCNC: 4.4 G/DL (ref 3.5–5.2)
ALP BLD-CCNC: 100 U/L (ref 35–104)
ALT SERPL-CCNC: 19 U/L (ref 5–33)
ANION GAP SERPL CALCULATED.3IONS-SCNC: 13 MMOL/L (ref 7–19)
AST SERPL-CCNC: 22 U/L (ref 5–32)
BILIRUB SERPL-MCNC: 0.5 MG/DL (ref 0.2–1.2)
BUN BLDV-MCNC: 16 MG/DL (ref 8–23)
CALCIUM SERPL-MCNC: 9.6 MG/DL (ref 8.8–10.2)
CHLORIDE BLD-SCNC: 100 MMOL/L (ref 98–111)
CHOLESTEROL, TOTAL: 185 MG/DL (ref 160–199)
CO2: 24 MMOL/L (ref 22–29)
CREAT SERPL-MCNC: 0.7 MG/DL (ref 0.5–0.9)
GFR NON-AFRICAN AMERICAN: >60
GLUCOSE BLD-MCNC: 100 MG/DL (ref 74–109)
HCT VFR BLD CALC: 47.8 % (ref 37–47)
HDLC SERPL-MCNC: 55 MG/DL (ref 65–121)
HEMOGLOBIN: 14.7 G/DL (ref 12–16)
LDL CHOLESTEROL CALCULATED: 103 MG/DL
MCH RBC QN AUTO: 28.9 PG (ref 27–31)
MCHC RBC AUTO-ENTMCNC: 30.8 G/DL (ref 33–37)
MCV RBC AUTO: 94.1 FL (ref 81–99)
PDW BLD-RTO: 13 % (ref 11.5–14.5)
PLATELET # BLD: 229 K/UL (ref 130–400)
PMV BLD AUTO: 10 FL (ref 9.4–12.3)
POTASSIUM SERPL-SCNC: 4.6 MMOL/L (ref 3.5–5)
RBC # BLD: 5.08 M/UL (ref 4.2–5.4)
SODIUM BLD-SCNC: 137 MMOL/L (ref 136–145)
TOTAL PROTEIN: 6.9 G/DL (ref 6.6–8.7)
TRIGL SERPL-MCNC: 134 MG/DL (ref 0–149)
VITAMIN D 25-HYDROXY: >60 NG/ML
WBC # BLD: 8 K/UL (ref 4.8–10.8)

## 2018-07-09 PROCEDURE — 1101F PT FALLS ASSESS-DOCD LE1/YR: CPT | Performed by: FAMILY MEDICINE

## 2018-07-09 PROCEDURE — 1090F PRES/ABSN URINE INCON ASSESS: CPT | Performed by: FAMILY MEDICINE

## 2018-07-09 PROCEDURE — 4040F PNEUMOC VAC/ADMIN/RCVD: CPT | Performed by: FAMILY MEDICINE

## 2018-07-09 PROCEDURE — 1123F ACP DISCUSS/DSCN MKR DOCD: CPT | Performed by: FAMILY MEDICINE

## 2018-07-09 PROCEDURE — G8427 DOCREV CUR MEDS BY ELIG CLIN: HCPCS | Performed by: FAMILY MEDICINE

## 2018-07-09 PROCEDURE — 99213 OFFICE O/P EST LOW 20 MIN: CPT | Performed by: FAMILY MEDICINE

## 2018-07-09 PROCEDURE — 3017F COLORECTAL CA SCREEN DOC REV: CPT | Performed by: FAMILY MEDICINE

## 2018-07-09 PROCEDURE — G8399 PT W/DXA RESULTS DOCUMENT: HCPCS | Performed by: FAMILY MEDICINE

## 2018-07-09 PROCEDURE — G8420 CALC BMI NORM PARAMETERS: HCPCS | Performed by: FAMILY MEDICINE

## 2018-07-09 PROCEDURE — 36415 COLL VENOUS BLD VENIPUNCTURE: CPT | Performed by: FAMILY MEDICINE

## 2018-07-09 PROCEDURE — 1036F TOBACCO NON-USER: CPT | Performed by: FAMILY MEDICINE

## 2018-07-09 RX ORDER — ZOLPIDEM TARTRATE 5 MG/1
5 TABLET ORAL
COMMUNITY
End: 2018-07-09 | Stop reason: SDUPTHER

## 2018-07-09 RX ORDER — ATORVASTATIN CALCIUM 20 MG/1
TABLET, FILM COATED ORAL
Qty: 30 TABLET | Refills: 3 | Status: SHIPPED | OUTPATIENT
Start: 2018-07-09 | End: 2018-07-20 | Stop reason: SINTOL

## 2018-07-09 RX ORDER — TOPIRAMATE 100 MG/1
200 CAPSULE, EXTENDED RELEASE ORAL
COMMUNITY
Start: 2018-06-28 | End: 2018-09-04

## 2018-07-09 NOTE — PATIENT INSTRUCTIONS
· Keep a list of your medicines with you. List all of the prescription medicines, nonprescription medicines, supplements, natural remedies, and vitamins that you take. Tell your healthcare providers who treat you about all of the products you are taking. Your provider can provide you with a form to keep track of them. Just ask. · Follow the directions that come with your medicine, including information about food or alcohol. Make sure you know how and when to take your medicine. Do not take more or less than you are supposed to take. · Keep all medicines out of the reach of children. · Store medicines according to the directions on the label. · Monitor yourself. Learn to know how your body reacts to your new medicine and keep track of how it makes you feel before attempting (If your provider has allowed you to do so) to drive or go to work. · Seek emergency medical attention if you think you have used too much of this medicine. An overdose of any prescription medicine can be fatal. Overdose symptoms may include extreme drowsiness, muscle weakness, confusion, cold and clammy skin, pinpoint pupils, shallow breathing, slow heart rate, fainting, or coma. · Don't share prescription medicines with others, even when they seem to have the same symptoms. What may be good for you may be harmful to others. · If you are no longer taking a prescribed medication and you have pills left please take your pills out of their original containers. Mix crushed pills with an undesirable substance, such as cat litter or used coffee grounds. Put the mixture into a disposable container with a lid, such as an empty margarine tub, or into a sealable bag. Cover up or remove any of your personal information on the empty containers by covering it with black permanent marker or duct tape. Place the sealed container with the mixture, and the empty drug containers, in the trash.    · If you use a medication that is in the form of a patch, dispose of used patches by folding them in half so that the sticky sides meet, and then flushing them down a toilet. They should not be placed in the household trash where children or pets can find them. · If you have any questions, ask your provider or pharmacist for more information. · Be sure to keep all appointments for provider visits or tests.

## 2018-07-12 ENCOUNTER — OFFICE VISIT (OUTPATIENT)
Dept: NEUROLOGY | Facility: CLINIC | Age: 68
End: 2018-07-12

## 2018-07-12 VITALS
WEIGHT: 136 LBS | DIASTOLIC BLOOD PRESSURE: 78 MMHG | HEART RATE: 74 BPM | SYSTOLIC BLOOD PRESSURE: 122 MMHG | HEIGHT: 64 IN | BODY MASS INDEX: 23.22 KG/M2

## 2018-07-12 DIAGNOSIS — M79.645 CHRONIC THUMB PAIN, BILATERAL: ICD-10-CM

## 2018-07-12 DIAGNOSIS — G89.29 CHRONIC THUMB PAIN, BILATERAL: ICD-10-CM

## 2018-07-12 DIAGNOSIS — M79.644 CHRONIC THUMB PAIN, BILATERAL: ICD-10-CM

## 2018-07-12 DIAGNOSIS — R51.9 CHRONIC INTRACTABLE HEADACHE, UNSPECIFIED HEADACHE TYPE: Primary | ICD-10-CM

## 2018-07-12 DIAGNOSIS — G89.29 CHRONIC INTRACTABLE HEADACHE, UNSPECIFIED HEADACHE TYPE: Primary | ICD-10-CM

## 2018-07-12 DIAGNOSIS — G44.40 MEDICATION OVERUSE HEADACHE: ICD-10-CM

## 2018-07-12 PROCEDURE — 99214 OFFICE O/P EST MOD 30 MIN: CPT | Performed by: CLINICAL NURSE SPECIALIST

## 2018-07-12 RX ORDER — ATORVASTATIN CALCIUM 20 MG/1
TABLET, FILM COATED ORAL
COMMUNITY
Start: 2018-07-09 | End: 2019-01-03

## 2018-07-12 NOTE — PROGRESS NOTES
Subjective     Chief Complaint   Patient presents with   • Headache     2-3 week but not near as severe as previous visit   • Migraine     1-2 migraines since last visit.          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 She is by herself today. She was last seen 5/15/18. Patient was started on Trokendi XR 50 mg and has tolerated and reports today that at the most will have 1-3 HA weekly and has had 1-2 migraines since last visit. She has stopped taking ibuprofen and will rarely take for joint pain. She did call about 1 week ago stating HA were becoming more frequent and trokendi was increased to 100 mg daily. She does report some paresthesia in finger tips and mild word finding but are nonconcerning to her. Over all she is extremely pleased with the improvement of HAs.   She does complain of bilateral thumb pain and was told she has spurs and will require surgical intervention that she is putting off. She would like to try compound cream.           Migraine    This is a chronic (saw a neurologist about 10 years ago) problem. The current episode started more than 1 year ago (since early 40s). The problem occurs daily (wake with the HA and usually gone by 1500). The pain is located in the bilateral, occipital and parietal region. The pain does not radiate. The pain quality is similar to prior headaches. The quality of the pain is described as dull. The pain is at a severity of 6/10. The pain is severe. Associated symptoms include nausea. Pertinent negatives include no coughing, dizziness, phonophobia, photophobia or vomiting. Associated symptoms comments: Tunnel vision. The symptoms are aggravated by hunger (was caffiene abuser but still has 12 oz Mt. Dew daily). She has tried acetaminophen, NSAIDs and cold packs (food will make temporarily go away, fioricet, ibuprofen 4 times a day, topamax, imitrex) for the symptoms. Her past medical  history is significant for hypertension and migraines in the family (mother, daughter).   Headache    This is a chronic problem. The current episode started more than 1 year ago. The problem occurs daily. The problem has been rapidly improving. The pain is located in the bilateral region. The pain quality is similar to prior headaches. Associated symptoms include nausea. Pertinent negatives include no coughing, dizziness, phonophobia, photophobia or vomiting. Treatments tried: trokendi XR, butalbitol. The treatment provided significant relief. Her past medical history is significant for hypertension and migraines in the family (mother, daughter).        Current Outpatient Prescriptions   Medication Sig Dispense Refill   • atorvastatin (LIPITOR) 20 MG tablet 1 tablet by mouth at bedtime for high cholesterol     • butalbital-acetaminophen-caffeine (FIORICET, ESGIC) -40 MG per tablet Take 1 tablet by mouth Every 4 (Four) Hours As Needed for Headache.     • Cholecalciferol (VITAMIN D PO) Take  by mouth Daily.     • Lactobacillus (ACIDOPHILUS PO) Take  by mouth Daily.     • LYSINE PO Take  by mouth Daily.     • MAGNESIUM PO Take  by mouth Daily.     • moexipril (UNIVASC) 15 MG tablet Take 15 mg by mouth Every Night.     • Multiple Vitamins-Minerals (MULTIVITAMIN WITH MINERALS) tablet tablet Take 1 tablet by mouth Daily.     • omeprazole (priLOSEC) 40 MG capsule Take 40 mg by mouth As Needed.     • ondansetron (ZOFRAN) 4 MG tablet Take 4 mg by mouth Every 8 (Eight) Hours As Needed for Nausea or Vomiting.     • Topiramate ER (TROKENDI XR) 100 MG capsule sustained-release 24 hr Take 100 mg by mouth Every Night. 30 capsule 2   • valACYclovir (VALTREX) 500 MG tablet Take 1,000 mg by mouth 2 (Two) Times a Day As Needed.     • zolpidem (AMBIEN) 5 MG tablet Take 5 mg by mouth. Take one half tab at bedtime     • cetaphil (CETAPHIL) cream Apply  topically As Needed for Dry Skin. Pain cream: meloxicam ,Baclofen, Gabapentin,  "lidocaine 1 bottle prn     No current facility-administered medications for this visit.        Past Medical History:   Diagnosis Date   • Headache    • Migraine        Past Surgical History:   Procedure Laterality Date   • KNEE SURGERY         family history includes No Known Problems in her father and mother.    Social History   Substance Use Topics   • Smoking status: Never Smoker   • Smokeless tobacco: Never Used   • Alcohol use No       Review of Systems   Constitutional: Negative.  Negative for fatigue.   Eyes: Negative.  Negative for photophobia.   Respiratory: Negative.  Negative for apnea and cough.    Cardiovascular: Negative.    Gastrointestinal: Positive for nausea. Negative for vomiting.   Endocrine: Negative.    Genitourinary: Negative.  Negative for dysuria and frequency.   Musculoskeletal: Negative for arthralgias, gait problem and myalgias.        Hx of bilateral knee replacment.   Skin: Negative.    Allergic/Immunologic: Negative.    Neurological: Positive for headaches. Negative for dizziness.   Hematological: Negative.    Psychiatric/Behavioral: Negative.  Negative for agitation and confusion.   All other systems reviewed and are negative.      Objective     /78   Pulse 74   Ht 162.6 cm (64\")   Wt 61.7 kg (136 lb)   BMI 23.34 kg/m² , Body mass index is 23.34 kg/m².    Physical Exam   Constitutional: She is oriented to person, place, and time. Vital signs are normal. She appears well-developed and well-nourished. She is cooperative.   HENT:   Head: Normocephalic and atraumatic.   Right Ear: Hearing and external ear normal.   Left Ear: Hearing and external ear normal.   Nose: Nose normal.   Mouth/Throat: Oropharynx is clear and moist.   Eyes: Conjunctivae, EOM and lids are normal. Pupils are equal, round, and reactive to light. Right eye exhibits normal extraocular motion and no nystagmus. Left eye exhibits normal extraocular motion and no nystagmus. Right pupil is round and reactive. Left " pupil is round and reactive. Pupils are equal.   Neck: Normal range of motion. Neck supple. Carotid bruit is not present.   Cardiovascular: Normal rate, regular rhythm and normal heart sounds.    No murmur heard.  Pulmonary/Chest: Effort normal and breath sounds normal. She has no decreased breath sounds. She has no wheezes. She has no rhonchi. She has no rales.   Abdominal: Soft. Bowel sounds are normal.   Musculoskeletal: Normal range of motion.   Neurological: She is alert and oriented to person, place, and time. She has normal strength and normal reflexes. She displays no tremor. No cranial nerve deficit or sensory deficit. She exhibits normal muscle tone. She displays a negative Romberg sign. Coordination and gait normal.   Reflex Scores:       Tricep reflexes are 2+ on the right side and 2+ on the left side.       Bicep reflexes are 2+ on the right side and 2+ on the left side.       Brachioradialis reflexes are 2+ on the right side and 2+ on the left side.       Patellar reflexes are 2+ on the right side and 2+ on the left side.       Achilles reflexes are 2+ on the right side and 2+ on the left side.  Awake, alert. No aphasia, no dysarthria  Completes simple and complex commands    CN II:  Visual fields full.  Pupils equally reactive to light  CN III, IV, VI:  Extraocular Muscles full with no signs of nystagmus  CN V:  Facial sensory is symmetric with no asymetries.  CN VII:  Facial motor symmetric  CN VIII:  Gross hearing intact bilaterally  CN IX:  Palate elevates symmetrically  CN X:  Palate elevates symmetrically  CN XI:  Shoulder shrug symmetric  CN XII:  Tongue is midline on protrusion    Full and symmetric strength bilateral upper and lower extremities.   Skin: Skin is warm and dry.   Psychiatric: She has a normal mood and affect. Her speech is normal and behavior is normal. Cognition and memory are normal.   Nursing note and vitals reviewed.      Results for orders placed or performed in visit on  18   Comprehensive Metabolic Panel   Result Value Ref Range    Glucose 100 70 - 100 mg/dL    BUN 19 5 - 21 mg/dL    Creatinine 0.73 0.50 - 1.40 mg/dL    Sodium 141 135 - 145 mmol/L    Potassium 5.1 3.5 - 5.3 mmol/L    Chloride 99 98 - 110 mmol/L    CO2 33.0 (H) 24.0 - 31.0 mmol/L    Calcium 9.7 8.4 - 10.4 mg/dL    Total Protein 7.0 6.3 - 8.7 g/dL    Albumin 4.20 3.50 - 5.00 g/dL    ALT (SGPT) 36 0 - 54 U/L    AST (SGOT) 27 7 - 45 U/L    Alkaline Phosphatase 93 24 - 120 U/L    Total Bilirubin 0.4 0.1 - 1.0 mg/dL    eGFR Non African Amer 80 >60 mL/min/1.73    Globulin 2.8 gm/dL    A/G Ratio 1.5 1.1 - 2.5 g/dL    BUN/Creatinine Ratio 26.0 (H) 7.0 - 25.0    Anion Gap 9.0 4.0 - 13.0 mmol/L   Lipid Panel   Result Value Ref Range    Total Cholesterol 187 130 - 200 mg/dL    Triglycerides 170 (H) 0 - 149 mg/dL    HDL Cholesterol 57 >=50 mg/dL    LDL Cholesterol  97 0 - 99 mg/dL    LDL/HDL Ratio 1.68    Magnesium   Result Value Ref Range    Magnesium 2.2 1.4 - 2.2 mg/dL   Sedimentation Rate   Result Value Ref Range    Sed Rate <1 0 - 20 mm/hr   T4, Free   Result Value Ref Range    Free T4 1.07 0.78 - 2.19 ng/dL   Vitamin B12   Result Value Ref Range    Vitamin B-12 923 239 - 931 pg/mL   Folate   Result Value Ref Range    Folate >20.00 >2.75 ng/mL        ASSESSMENT/PLAN    Diagnoses and all orders for this visit:    Chronic intractable headache, unspecified headache type    Medication overuse headache    Other orders  -     atorvastatin (LIPITOR) 20 MG tablet; 1 tablet by mouth at bedtime for high cholesterol  -     cetaphil (CETAPHIL) cream; Apply  topically As Needed for Dry Skin. Pain cream: meloxicam ,Baclofen, Gabapentin, lidocaine      MEDICAL DECISION MAKIN. Patient may take ibuprofen if needed for joint pain  2. Continue with  Trokendi XR  100 mg   3. Will try compound cream for joint pain. Patient to follow up with PCP if pain persists.   4. Patient's Body mass index is 23.34 kg/m². BMI is within normal  parameters. No follow-up required.  5. Patient to use fioricet sparingly.     allergies and all known medications/prescriptions have been reviewed using resources available on this encounter.    Return in about 6 months (around 1/12/2019).        MARGO Lakhani

## 2018-07-16 ASSESSMENT — ENCOUNTER SYMPTOMS: RESPIRATORY NEGATIVE: 1

## 2018-07-20 ENCOUNTER — TELEPHONE (OUTPATIENT)
Dept: PRIMARY CARE CLINIC | Age: 68
End: 2018-07-20

## 2018-07-20 NOTE — TELEPHONE ENCOUNTER
[id number] Cele Gaitan -- please documented in chart that she had a side effect of Lipitor and then put the Crestor in the medicine list and let me know if I need to call in a new prescription

## 2018-08-30 ENCOUNTER — TELEPHONE (OUTPATIENT)
Dept: NEUROLOGY | Facility: CLINIC | Age: 68
End: 2018-08-30

## 2018-08-31 DIAGNOSIS — G43.711 INTRACTABLE CHRONIC MIGRAINE WITHOUT AURA AND WITH STATUS MIGRAINOSUS: ICD-10-CM

## 2018-08-31 RX ORDER — BUTALBITAL, ACETAMINOPHEN AND CAFFEINE 50; 325; 40 MG/1; MG/1; MG/1
TABLET ORAL
Qty: 45 TABLET | Refills: 0 | Status: SHIPPED | OUTPATIENT
Start: 2018-08-31 | End: 2018-10-25 | Stop reason: SDUPTHER

## 2018-09-04 ENCOUNTER — OFFICE VISIT (OUTPATIENT)
Dept: PRIMARY CARE CLINIC | Age: 68
End: 2018-09-04
Payer: MEDICARE

## 2018-09-04 VITALS
SYSTOLIC BLOOD PRESSURE: 142 MMHG | OXYGEN SATURATION: 98 % | HEART RATE: 85 BPM | DIASTOLIC BLOOD PRESSURE: 86 MMHG | TEMPERATURE: 98.6 F | BODY MASS INDEX: 23.39 KG/M2 | WEIGHT: 137 LBS | HEIGHT: 64 IN

## 2018-09-04 DIAGNOSIS — N90.7 LABIAL CYST: Primary | ICD-10-CM

## 2018-09-04 PROCEDURE — G8399 PT W/DXA RESULTS DOCUMENT: HCPCS | Performed by: FAMILY MEDICINE

## 2018-09-04 PROCEDURE — 1036F TOBACCO NON-USER: CPT | Performed by: FAMILY MEDICINE

## 2018-09-04 PROCEDURE — G8420 CALC BMI NORM PARAMETERS: HCPCS | Performed by: FAMILY MEDICINE

## 2018-09-04 PROCEDURE — 1123F ACP DISCUSS/DSCN MKR DOCD: CPT | Performed by: FAMILY MEDICINE

## 2018-09-04 PROCEDURE — 99213 OFFICE O/P EST LOW 20 MIN: CPT | Performed by: FAMILY MEDICINE

## 2018-09-04 PROCEDURE — 4040F PNEUMOC VAC/ADMIN/RCVD: CPT | Performed by: FAMILY MEDICINE

## 2018-09-04 PROCEDURE — G8427 DOCREV CUR MEDS BY ELIG CLIN: HCPCS | Performed by: FAMILY MEDICINE

## 2018-09-04 PROCEDURE — 1101F PT FALLS ASSESS-DOCD LE1/YR: CPT | Performed by: FAMILY MEDICINE

## 2018-09-04 PROCEDURE — 1090F PRES/ABSN URINE INCON ASSESS: CPT | Performed by: FAMILY MEDICINE

## 2018-09-04 PROCEDURE — 3017F COLORECTAL CA SCREEN DOC REV: CPT | Performed by: FAMILY MEDICINE

## 2018-09-04 RX ORDER — ROSUVASTATIN CALCIUM 5 MG/1
TABLET, COATED ORAL
COMMUNITY
Start: 2018-08-16 | End: 2018-09-04

## 2018-09-04 RX ORDER — LISINOPRIL 20 MG/1
TABLET ORAL
COMMUNITY
Start: 2018-08-13 | End: 2018-11-14 | Stop reason: SDUPTHER

## 2018-09-04 RX ORDER — TOPIRAMATE 200 MG/1
CAPSULE, EXTENDED RELEASE ORAL
COMMUNITY
Start: 2018-08-30 | End: 2018-09-04

## 2018-09-05 ENCOUNTER — HOSPITAL ENCOUNTER (OUTPATIENT)
Dept: ULTRASOUND IMAGING | Age: 68
Discharge: HOME | End: 2018-09-05
Payer: MEDICARE

## 2018-09-05 DIAGNOSIS — N90.7 LABIAL CYST: ICD-10-CM

## 2018-09-05 PROCEDURE — 76857 US EXAM PELVIC LIMITED: CPT

## 2018-09-05 ASSESSMENT — ENCOUNTER SYMPTOMS
ABDOMINAL PAIN: 0
COUGH: 0
EYE DISCHARGE: 0
DIARRHEA: 0
NAUSEA: 0
WHEEZING: 0
VOMITING: 0
BACK PAIN: 0
COLOR CHANGE: 0

## 2018-10-09 ENCOUNTER — NURSE ONLY (OUTPATIENT)
Dept: PRIMARY CARE CLINIC | Age: 68
End: 2018-10-09
Payer: MEDICARE

## 2018-10-09 DIAGNOSIS — E78.9 ELEVATED SERUM CHOLESTEROL: Primary | ICD-10-CM

## 2018-10-09 DIAGNOSIS — Z79.899 ENCOUNTER FOR LONG-TERM (CURRENT) USE OF MEDICATIONS: ICD-10-CM

## 2018-10-09 LAB
ALBUMIN SERPL-MCNC: 4.5 G/DL (ref 3.5–5.2)
ALP BLD-CCNC: 99 U/L (ref 35–104)
ALT SERPL-CCNC: 27 U/L (ref 5–33)
AST SERPL-CCNC: 29 U/L (ref 5–32)
BILIRUB SERPL-MCNC: 0.3 MG/DL (ref 0.2–1.2)
BILIRUBIN DIRECT: 0 MG/DL (ref 0–0.3)
BILIRUBIN, INDIRECT: 0.3 MG/DL (ref 0.1–1)
CHOLESTEROL, TOTAL: 164 MG/DL (ref 160–199)
HDLC SERPL-MCNC: 58 MG/DL (ref 65–121)
LDL CHOLESTEROL CALCULATED: 85 MG/DL
TOTAL PROTEIN: 6.5 G/DL (ref 6.6–8.7)
TRIGL SERPL-MCNC: 105 MG/DL (ref 0–149)

## 2018-10-09 PROCEDURE — 36415 COLL VENOUS BLD VENIPUNCTURE: CPT | Performed by: FAMILY MEDICINE

## 2018-10-09 RX ORDER — ROSUVASTATIN CALCIUM 5 MG/1
TABLET, COATED ORAL
COMMUNITY
Start: 2018-09-12 | End: 2018-11-14 | Stop reason: SDUPTHER

## 2018-10-25 DIAGNOSIS — G43.711 INTRACTABLE CHRONIC MIGRAINE WITHOUT AURA AND WITH STATUS MIGRAINOSUS: ICD-10-CM

## 2018-10-25 RX ORDER — BUTALBITAL, ACETAMINOPHEN AND CAFFEINE 50; 325; 40 MG/1; MG/1; MG/1
TABLET ORAL
Qty: 45 TABLET | Refills: 0 | Status: SHIPPED | OUTPATIENT
Start: 2018-10-25 | End: 2018-11-30 | Stop reason: SDUPTHER

## 2018-11-28 RX ORDER — TOPIRAMATE 200 MG/1
CAPSULE, EXTENDED RELEASE ORAL
Qty: 30 CAPSULE | Refills: 5 | Status: SHIPPED | OUTPATIENT
Start: 2018-11-28 | End: 2019-05-31 | Stop reason: SDUPTHER

## 2018-12-12 RX ORDER — VALACYCLOVIR HYDROCHLORIDE 1 G/1
TABLET, FILM COATED ORAL
Qty: 30 TABLET | Refills: 3 | Status: SHIPPED | OUTPATIENT
Start: 2018-12-12 | End: 2020-01-21 | Stop reason: ALTCHOICE

## 2018-12-26 DIAGNOSIS — G47.00 INSOMNIA, UNSPECIFIED TYPE: Primary | ICD-10-CM

## 2018-12-26 RX ORDER — ZOLPIDEM TARTRATE 5 MG/1
TABLET ORAL
Qty: 30 TABLET | Refills: 0 | Status: SHIPPED | OUTPATIENT
Start: 2018-12-26 | End: 2019-01-26

## 2019-01-03 ENCOUNTER — OFFICE VISIT (OUTPATIENT)
Dept: NEUROLOGY | Facility: CLINIC | Age: 69
End: 2019-01-03

## 2019-01-03 VITALS
DIASTOLIC BLOOD PRESSURE: 80 MMHG | WEIGHT: 142 LBS | BODY MASS INDEX: 24.24 KG/M2 | HEIGHT: 64 IN | HEART RATE: 76 BPM | SYSTOLIC BLOOD PRESSURE: 120 MMHG

## 2019-01-03 DIAGNOSIS — G43.019 INTRACTABLE MIGRAINE WITHOUT AURA AND WITHOUT STATUS MIGRAINOSUS: ICD-10-CM

## 2019-01-03 DIAGNOSIS — R51.9 CHRONIC INTRACTABLE HEADACHE, UNSPECIFIED HEADACHE TYPE: Primary | ICD-10-CM

## 2019-01-03 DIAGNOSIS — G89.29 CHRONIC INTRACTABLE HEADACHE, UNSPECIFIED HEADACHE TYPE: Primary | ICD-10-CM

## 2019-01-03 PROCEDURE — 99213 OFFICE O/P EST LOW 20 MIN: CPT | Performed by: CLINICAL NURSE SPECIALIST

## 2019-01-03 RX ORDER — ROSUVASTATIN CALCIUM 5 MG/1
TABLET, COATED ORAL
COMMUNITY
Start: 2018-11-14 | End: 2020-07-09

## 2019-01-03 RX ORDER — PROCHLORPERAZINE MALEATE 5 MG/1
5 TABLET ORAL EVERY 8 HOURS PRN
Qty: 20 TABLET | Refills: 1 | Status: SHIPPED | OUTPATIENT
Start: 2019-01-03 | End: 2020-11-04

## 2019-01-03 NOTE — PROGRESS NOTES
Subjective     Chief Complaint   Patient presents with   • Migraine     Maybe 1-2 migraines since last visit.    • Headache     HA everyday for the last two weeks.- before this she was going weeks without any.         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 She is by herself today. She was last seen/2018. Patient had been taking  Trokendi XR 50 mg and and in August 2018 had called stating HAs have become more frequent. Trokendi XR was increased to 100 mg daily. She reports that since starting Trokendi XR she has stopped drinking sodas secondary to altered taste. She does complain of impaired memory and word finding but because HAs are more controlled would like to continue Trokendi XR. She does report in the last 2-3 weeks having more HA/migraines and attributes to the Holiday season. She is scheduled to leave for [place] in 2 weeks. She reports 1-2 migraine since last visit but having daily HA in last 2-3 weeks, and some with tunnel vision. She has been taking Butalbitol regularly during this time taking at minimum one time daily.       Migraine    This is a chronic (saw a neurologist about 10 years ago) problem. The current episode started more than 1 year ago (since early 40s). The problem occurs daily (wake with the HA and usually gone by 1500). The pain is located in the bilateral, occipital and parietal region. The pain does not radiate. The pain quality is similar to prior headaches. The quality of the pain is described as dull. The pain is at a severity of 6/10. The pain is severe. Associated symptoms include nausea. Pertinent negatives include no coughing, dizziness, hearing loss, phonophobia, photophobia, sinus pressure or vomiting. Associated symptoms comments: Tunnel vision. The symptoms are aggravated by hunger (was caffiene abuser but still has 12 oz Mt. Dew daily). She has tried acetaminophen, NSAIDs and cold packs (food will  make temporarily go away, fioricet, ibuprofen 4 times a day, topamax, imitrex) for the symptoms. Her past medical history is significant for hypertension and migraines in the family (mother, daughter).   Headache    This is a chronic problem. The current episode started more than 1 year ago. The problem occurs daily. The problem has been rapidly improving. The pain is located in the bilateral region. The pain quality is similar to prior headaches. Associated symptoms include nausea. Pertinent negatives include no coughing, dizziness, hearing loss, phonophobia, photophobia, sinus pressure or vomiting. Treatments tried: trokendi XR, butalbitol. The treatment provided significant relief. Her past medical history is significant for hypertension and migraines in the family (mother, daughter).        Current Outpatient Medications   Medication Sig Dispense Refill   • butalbital-acetaminophen-caffeine (FIORICET, ESGIC) -40 MG per tablet Take 1 tablet by mouth Every 4 (Four) Hours As Needed for Headache.     • cetaphil (CETAPHIL) cream Apply  topically As Needed for Dry Skin. Pain cream: meloxicam ,Baclofen, Gabapentin, lidocaine 1 bottle prn   • Cholecalciferol (VITAMIN D PO) Take  by mouth Daily.     • Lactobacillus (ACIDOPHILUS PO) Take  by mouth Daily.     • LYSINE PO Take  by mouth Daily.     • MAGNESIUM PO Take  by mouth Daily.     • moexipril (UNIVASC) 15 MG tablet Take 15 mg by mouth Every Night.     • Multiple Vitamins-Minerals (MULTIVITAMIN WITH MINERALS) tablet tablet Take 1 tablet by mouth Daily.     • omeprazole (priLOSEC) 40 MG capsule Take 40 mg by mouth As Needed.     • ondansetron (ZOFRAN) 4 MG tablet Take 4 mg by mouth Every 8 (Eight) Hours As Needed for Nausea or Vomiting.     • rosuvastatin (CRESTOR) 5 MG tablet TAKE ONE TABLET BY MOUTH AT BEDTIME.     • TROKENDI  MG capsule sustained-release 24 hr TAKE 1 CAPSULE BY MOUTH AT BEDTIME 30 capsule 5   • valACYclovir (VALTREX) 500 MG tablet Take  1,000 mg by mouth 2 (Two) Times a Day As Needed.     • zolpidem (AMBIEN) 5 MG tablet Take 5 mg by mouth. Take one half tab at bedtime     • Fremanezumab-vfrm (AJOVY) 225 MG/1.5ML solution prefilled syringe Inject 1 syringe under the skin into the appropriate area as directed 1 (One) Time for 1 dose. 1 syringe 0   • prochlorperazine (COMPAZINE) 5 MG tablet Take 1 tablet by mouth Every 8 (Eight) Hours As Needed for Nausea or Vomiting. 20 tablet 1     Current Facility-Administered Medications   Medication Dose Route Frequency Provider Last Rate Last Dose   • Fremanezumab-vfrm solution prefilled syringe 1 syringe  1 syringe Subcutaneous Q30 Days Era Early, MARGO           Past Medical History:   Diagnosis Date   • Headache    • Migraine        Past Surgical History:   Procedure Laterality Date   • KNEE SURGERY         family history includes No Known Problems in her father and mother.    Social History     Tobacco Use   • Smoking status: Never Smoker   • Smokeless tobacco: Never Used   Substance Use Topics   • Alcohol use: No   • Drug use: No       Review of Systems   Constitutional: Negative.  Negative for fatigue.   HENT: Negative for hearing loss and sinus pressure.    Eyes: Negative.  Negative for photophobia.   Respiratory: Negative.  Negative for apnea and cough.    Cardiovascular: Negative.    Gastrointestinal: Positive for nausea. Negative for vomiting.   Endocrine: Negative.  Negative for cold intolerance and heat intolerance.   Genitourinary: Negative.  Negative for dysuria and frequency.   Musculoskeletal: Negative for arthralgias, gait problem and myalgias.        Hx of bilateral knee replacment.   Skin: Negative.    Allergic/Immunologic: Negative.    Neurological: Positive for headaches. Negative for dizziness.   Hematological: Negative.    Psychiatric/Behavioral: Negative.  Negative for agitation, confusion and hallucinations.   All other systems reviewed and are negative.      Objective     BP  "120/80   Pulse 76   Ht 162.6 cm (64\")   Wt 64.4 kg (142 lb)   BMI 24.37 kg/m² , Body mass index is 24.37 kg/m².    Physical Exam   Constitutional: She is oriented to person, place, and time. Vital signs are normal. She appears well-developed and well-nourished. She is cooperative.   HENT:   Head: Normocephalic and atraumatic.   Right Ear: Hearing and external ear normal.   Left Ear: Hearing and external ear normal.   Nose: Nose normal.   Mouth/Throat: Oropharynx is clear and moist.   Eyes: Conjunctivae, EOM and lids are normal. Pupils are equal, round, and reactive to light. Right eye exhibits normal extraocular motion and no nystagmus. Left eye exhibits normal extraocular motion and no nystagmus. Right pupil is round and reactive. Left pupil is round and reactive. Pupils are equal.   Neck: Normal range of motion. Neck supple. Carotid bruit is not present.   Cardiovascular: Normal rate, regular rhythm and normal heart sounds.   No murmur heard.  Pulmonary/Chest: Effort normal and breath sounds normal. She has no decreased breath sounds. She has no wheezes. She has no rhonchi. She has no rales.   Abdominal: Soft. Bowel sounds are normal.   Musculoskeletal: Normal range of motion.   Neurological: She is alert and oriented to person, place, and time. She has normal strength and normal reflexes. She displays no tremor. No cranial nerve deficit or sensory deficit. She exhibits normal muscle tone. She displays a negative Romberg sign. Coordination and gait normal.   Reflex Scores:       Tricep reflexes are 2+ on the right side and 2+ on the left side.       Bicep reflexes are 2+ on the right side and 2+ on the left side.       Brachioradialis reflexes are 2+ on the right side and 2+ on the left side.       Patellar reflexes are 2+ on the right side and 2+ on the left side.       Achilles reflexes are 2+ on the right side and 2+ on the left side.  Awake, alert. No aphasia, no dysarthria  Completes simple and complex " commands    CN II:  Visual fields full.  Pupils equally reactive to light  CN III, IV, VI:  Extraocular Muscles full with no signs of nystagmus  CN V:  Facial sensory is symmetric with no asymetries.  CN VII:  Facial motor symmetric  CN VIII:  Gross hearing intact bilaterally  CN IX:  Palate elevates symmetrically  CN X:  Palate elevates symmetrically  CN XI:  Shoulder shrug symmetric  CN XII:  Tongue is midline on protrusion    Full and symmetric strength bilateral upper and lower extremities.   Skin: Skin is warm and dry.   Psychiatric: She has a normal mood and affect. Her speech is normal and behavior is normal. Cognition and memory are normal.   Nursing note and vitals reviewed.           ASSESSMENT/PLAN    Diagnoses and all orders for this visit:    Chronic intractable headache, unspecified headache type  -     Fremanezumab-vfrm solution prefilled syringe 1 syringe; Inject 1 syringe under the skin into the appropriate area as directed Every 30 (Thirty) Days.    Intractable migraine without aura and without status migrainosus  -     Fremanezumab-vfrm solution prefilled syringe 1 syringe; Inject 1 syringe under the skin into the appropriate area as directed Every 30 (Thirty) Days.    Other orders  -     rosuvastatin (CRESTOR) 5 MG tablet; TAKE ONE TABLET BY MOUTH AT BEDTIME.  -     prochlorperazine (COMPAZINE) 5 MG tablet; Take 1 tablet by mouth Every 8 (Eight) Hours As Needed for Nausea or Vomiting.  -     Fremanezumab-vfrm (AJOVY) 225 MG/1.5ML solution prefilled syringe; Inject 1 syringe under the skin into the appropriate area as directed 1 (One) Time for 1 dose.    MEDICAL DECISION MAKING:  Patient likely having medication overuse as she admits to taking butalbital frequently in the last few weeks  1. Patient may take ibuprofen if needed for joint pain  2. Continue with  Trokendi XR  100 mg   3. will try compazine as abortive agent and counseled on side effects  4. will trial Ajovy monthly and counseled on  side effects. Demonstration given on how to perform injection and patient states she has a friend that is an RN that can help with the injection.  5. Patient to use fioricet sparingly and to wean off use  6. Consider decrease dose of Trokendi at follow up or discontinue altogether as she is having impaired cognition.   7. Fall Risk Assessment  Fallen in past 6 months: 0--> No  Mental Status: 0--> no mental status change  Mobility: 0--> No mobility issues  Medications: 1--> Hypnotics, 1--> Narcotics  Total Fall Risk Score: 3       allergies and all known medications/prescriptions have been reviewed using resources available on this encounter.    Return in about 3 months (around 4/3/2019).        Era Early, APRN

## 2019-01-07 ENCOUNTER — OFFICE VISIT (OUTPATIENT)
Dept: PRIMARY CARE CLINIC | Age: 69
End: 2019-01-07
Payer: MEDICARE

## 2019-01-07 VITALS
HEART RATE: 74 BPM | BODY MASS INDEX: 21.99 KG/M2 | WEIGHT: 132 LBS | HEIGHT: 65 IN | RESPIRATION RATE: 16 BRPM | OXYGEN SATURATION: 98 % | TEMPERATURE: 97.2 F | DIASTOLIC BLOOD PRESSURE: 81 MMHG | SYSTOLIC BLOOD PRESSURE: 115 MMHG

## 2019-01-07 DIAGNOSIS — Z12.31 SCREENING MAMMOGRAM, ENCOUNTER FOR: Primary | ICD-10-CM

## 2019-01-07 DIAGNOSIS — G47.00 INSOMNIA, UNSPECIFIED TYPE: ICD-10-CM

## 2019-01-07 DIAGNOSIS — G43.711 INTRACTABLE CHRONIC MIGRAINE WITHOUT AURA AND WITH STATUS MIGRAINOSUS: ICD-10-CM

## 2019-01-07 DIAGNOSIS — Z00.00 ROUTINE GENERAL MEDICAL EXAMINATION AT A HEALTH CARE FACILITY: ICD-10-CM

## 2019-01-07 PROCEDURE — G0438 PPPS, INITIAL VISIT: HCPCS | Performed by: NURSE PRACTITIONER

## 2019-01-07 PROCEDURE — G8484 FLU IMMUNIZE NO ADMIN: HCPCS | Performed by: NURSE PRACTITIONER

## 2019-01-07 PROCEDURE — 4040F PNEUMOC VAC/ADMIN/RCVD: CPT | Performed by: NURSE PRACTITIONER

## 2019-01-07 RX ORDER — BUTALBITAL, ACETAMINOPHEN AND CAFFEINE 50; 325; 40 MG/1; MG/1; MG/1
TABLET ORAL
Qty: 45 TABLET | Refills: 1 | Status: SHIPPED | OUTPATIENT
Start: 2019-01-07 | End: 2019-04-25 | Stop reason: SDUPTHER

## 2019-01-07 RX ORDER — ROSUVASTATIN CALCIUM 5 MG/1
TABLET, COATED ORAL
Qty: 30 TABLET | Refills: 11 | Status: SHIPPED | OUTPATIENT
Start: 2019-01-07 | End: 2020-01-14

## 2019-01-07 ASSESSMENT — PATIENT HEALTH QUESTIONNAIRE - PHQ9
SUM OF ALL RESPONSES TO PHQ QUESTIONS 1-9: 0
2. FEELING DOWN, DEPRESSED OR HOPELESS: 0
SUM OF ALL RESPONSES TO PHQ QUESTIONS 1-9: 0
SUM OF ALL RESPONSES TO PHQ9 QUESTIONS 1 & 2: 0
1. LITTLE INTEREST OR PLEASURE IN DOING THINGS: 0

## 2019-01-07 ASSESSMENT — LIFESTYLE VARIABLES
AUDIT TOTAL SCORE: 1
AUDIT-C TOTAL SCORE: 1
HOW OFTEN DO YOU HAVE SIX OR MORE DRINKS ON ONE OCCASION: 0
HOW OFTEN DURING THE LAST YEAR HAVE YOU BEEN UNABLE TO REMEMBER WHAT HAPPENED THE NIGHT BEFORE BECAUSE YOU HAD BEEN DRINKING: 0
HOW MANY STANDARD DRINKS CONTAINING ALCOHOL DO YOU HAVE ON A TYPICAL DAY: 0
HAVE YOU OR SOMEONE ELSE BEEN INJURED AS A RESULT OF YOUR DRINKING: 0
HOW OFTEN DURING THE LAST YEAR HAVE YOU NEEDED AN ALCOHOLIC DRINK FIRST THING IN THE MORNING TO GET YOURSELF GOING AFTER A NIGHT OF HEAVY DRINKING: 0
HOW OFTEN DURING THE LAST YEAR HAVE YOU HAD A FEELING OF GUILT OR REMORSE AFTER DRINKING: 0
HAS A RELATIVE, FRIEND, DOCTOR, OR ANOTHER HEALTH PROFESSIONAL EXPRESSED CONCERN ABOUT YOUR DRINKING OR SUGGESTED YOU CUT DOWN: 0
HOW OFTEN DURING THE LAST YEAR HAVE YOU FOUND THAT YOU WERE NOT ABLE TO STOP DRINKING ONCE YOU HAD STARTED: 0
HOW OFTEN DURING THE LAST YEAR HAVE YOU FAILED TO DO WHAT WAS NORMALLY EXPECTED FROM YOU BECAUSE OF DRINKING: 0
HOW OFTEN DO YOU HAVE A DRINK CONTAINING ALCOHOL: 1

## 2019-01-07 ASSESSMENT — ANXIETY QUESTIONNAIRES: GAD7 TOTAL SCORE: 0

## 2019-02-14 DIAGNOSIS — G47.00 INSOMNIA, UNSPECIFIED TYPE: Primary | ICD-10-CM

## 2019-02-14 RX ORDER — ZOLPIDEM TARTRATE 5 MG/1
TABLET ORAL
Qty: 30 TABLET | Refills: 1 | Status: SHIPPED | OUTPATIENT
Start: 2019-02-14 | End: 2019-03-14

## 2019-03-05 ENCOUNTER — TELEPHONE (OUTPATIENT)
Dept: PRIMARY CARE CLINIC | Age: 69
End: 2019-03-05

## 2019-04-04 ENCOUNTER — OFFICE VISIT (OUTPATIENT)
Dept: NEUROLOGY | Facility: CLINIC | Age: 69
End: 2019-04-04

## 2019-04-04 VITALS
BODY MASS INDEX: 22.71 KG/M2 | SYSTOLIC BLOOD PRESSURE: 102 MMHG | DIASTOLIC BLOOD PRESSURE: 70 MMHG | HEIGHT: 64 IN | WEIGHT: 133 LBS | HEART RATE: 93 BPM | OXYGEN SATURATION: 97 %

## 2019-04-04 DIAGNOSIS — R06.83 SNORING: ICD-10-CM

## 2019-04-04 DIAGNOSIS — R51.9 INTRACTABLE EPISODIC HEADACHE, UNSPECIFIED HEADACHE TYPE: ICD-10-CM

## 2019-04-04 DIAGNOSIS — G43.019 INTRACTABLE MIGRAINE WITHOUT AURA AND WITHOUT STATUS MIGRAINOSUS: Primary | ICD-10-CM

## 2019-04-04 PROCEDURE — 99214 OFFICE O/P EST MOD 30 MIN: CPT | Performed by: CLINICAL NURSE SPECIALIST

## 2019-04-04 NOTE — PROGRESS NOTES
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 She is by herself today. She was last seen 1/2019. She is ambulating unassisted. Since last visit reports continues to have HA 5-6 days of the week lasting several hours.  Since last visit she has been taking Trokendi  mg daily. Does complain of altered taste and at times word finding problems. She was also started on Ajovy and did one injection. Denied side effects. She did not continue even after being counseled that may take 306 months to see benefit. She tells me her daughter takes emgality and would like to try this but she is also interested in BOTOX. She does appear to have chronic migraine and episodic type HA as well. States will some times wake with a HA. She does snore. States she does not think she would be able to tolerate CPAP.  She admits to taking Butalbital daily but states this does relieve her HA. She was prescribed compazine as abortive agent and never tried.      EPWORTH= 8, STOP-BANG = INTERMEDIATE    Migraine    This is a chronic (saw a neurologist about 10 years ago) problem. The current episode started more than 1 year ago (since early 40s). The problem occurs daily (wake with the HA and usually gone by 1500). The pain is located in the bilateral, occipital and parietal region. The pain does not radiate. The pain quality is similar to prior headaches. The quality of the pain is described as dull. The pain is at a severity of 6/10. The pain is severe. Associated symptoms include nausea. Pertinent negatives include no coughing, dizziness, hearing loss, phonophobia, photophobia, sinus pressure or vomiting. Associated symptoms comments: Tunnel vision. The symptoms are aggravated by hunger (was caffiene abuser but still has 12 oz Mt. Dew daily). She has tried  acetaminophen, NSAIDs and cold packs (food will make temporarily go away, fioricet, ibuprofen 4 times a day, topamax, imitrex) for the symptoms. Her past medical history is significant for hypertension and migraines in the family (mother, daughter).   Headache    This is a chronic problem. The current episode started more than 1 year ago. The problem occurs daily. The problem has been rapidly improving. The pain is located in the bilateral region. The pain quality is similar to prior headaches. Associated symptoms include nausea. Pertinent negatives include no coughing, dizziness, hearing loss, phonophobia, photophobia, sinus pressure or vomiting. Treatments tried: trokendi XR, butalbitol. The treatment provided significant relief. Her past medical history is significant for hypertension and migraines in the family (mother, daughter).        Current Outpatient Medications   Medication Sig Dispense Refill   • butalbital-acetaminophen-caffeine (FIORICET, ESGIC) -40 MG per tablet Take 1 tablet by mouth Every 4 (Four) Hours As Needed for Headache.     • cetaphil (CETAPHIL) cream Apply  topically As Needed for Dry Skin. Pain cream: meloxicam ,Baclofen, Gabapentin, lidocaine 1 bottle prn   • Cholecalciferol (VITAMIN D PO) Take  by mouth Daily.     • Lactobacillus (ACIDOPHILUS PO) Take  by mouth Daily.     • LYSINE PO Take  by mouth Daily.     • MAGNESIUM PO Take  by mouth Daily.     • moexipril (UNIVASC) 15 MG tablet Take 15 mg by mouth Every Night.     • Multiple Vitamins-Minerals (MULTIVITAMIN WITH MINERALS) tablet tablet Take 1 tablet by mouth Daily.     • omeprazole (priLOSEC) 40 MG capsule Take 40 mg by mouth As Needed.     • ondansetron (ZOFRAN) 4 MG tablet Take 4 mg by mouth Every 8 (Eight) Hours As Needed for Nausea or Vomiting.     • prochlorperazine (COMPAZINE) 5 MG tablet Take 1 tablet by mouth Every 8 (Eight) Hours As Needed for Nausea or Vomiting. 20 tablet 1   • rosuvastatin (CRESTOR) 5 MG tablet TAKE  ONE TABLET BY MOUTH AT BEDTIME.     • TROKENDI  MG capsule sustained-release 24 hr TAKE 1 CAPSULE BY MOUTH AT BEDTIME 30 capsule 5   • valACYclovir (VALTREX) 500 MG tablet Take 1,000 mg by mouth 2 (Two) Times a Day As Needed.     • zolpidem (AMBIEN) 5 MG tablet Take 5 mg by mouth. Take one half tab at bedtime     • galcanezumab-gnlm (EMGALITY) 120 MG/ML prefilled syringe Inject 2 mL under the skin into the appropriate area as directed 1 (One) Time for 1 dose. 1.12 mL 0   • galcanezumab-gnlm (EMGALITY) 120 MG/ML prefilled syringe Inject 1 mL under the skin into the appropriate area as directed Every 30 (Thirty) Days. 1.12 mL 2     No current facility-administered medications for this visit.        Past Medical History:   Diagnosis Date   • Headache    • Migraine        Past Surgical History:   Procedure Laterality Date   • KNEE SURGERY         family history includes No Known Problems in her father and mother.    Social History     Tobacco Use   • Smoking status: Never Smoker   • Smokeless tobacco: Never Used   Substance Use Topics   • Alcohol use: No   • Drug use: No       Review of Systems   Constitutional: Negative.  Negative for fatigue.   HENT: Negative for hearing loss and sinus pressure.    Eyes: Negative.  Negative for photophobia.   Respiratory: Negative.  Negative for apnea and cough.    Cardiovascular: Negative.  Negative for leg swelling.   Gastrointestinal: Positive for nausea. Negative for vomiting.   Endocrine: Negative.  Negative for cold intolerance and heat intolerance.   Genitourinary: Negative.  Negative for dysuria and frequency.   Musculoskeletal: Negative for arthralgias, gait problem and myalgias.        Hx of bilateral knee replacment.   Skin: Negative.    Allergic/Immunologic: Negative.    Neurological: Positive for headaches. Negative for dizziness.   Hematological: Negative.    Psychiatric/Behavioral: Negative.  Negative for agitation, confusion and hallucinations.   All other  "systems reviewed and are negative.      Objective     /70 (BP Location: Left arm, Patient Position: Sitting, Cuff Size: Adult)   Pulse 93   Ht 162.6 cm (64\")   Wt 60.3 kg (133 lb)   SpO2 97%   BMI 22.83 kg/m² , Body mass index is 22.83 kg/m².    Physical Exam   Constitutional: She is oriented to person, place, and time. Vital signs are normal. She appears well-developed and well-nourished. She is cooperative.   HENT:   Head: Normocephalic and atraumatic.   Right Ear: Hearing and external ear normal.   Left Ear: Hearing and external ear normal.   Nose: Nose normal.   Mouth/Throat: Oropharynx is clear and moist.   Eyes: Conjunctivae, EOM and lids are normal. Pupils are equal, round, and reactive to light. Right eye exhibits normal extraocular motion and no nystagmus. Left eye exhibits normal extraocular motion and no nystagmus. Right pupil is round and reactive. Left pupil is round and reactive. Pupils are equal.   Neck: Normal range of motion. Neck supple. Carotid bruit is not present.   Cardiovascular: Normal rate, regular rhythm and normal heart sounds.   No murmur heard.  Pulmonary/Chest: Effort normal and breath sounds normal. She has no decreased breath sounds. She has no wheezes. She has no rhonchi. She has no rales.   Abdominal: Soft. Bowel sounds are normal.   Musculoskeletal: Normal range of motion.   Neurological: She is alert and oriented to person, place, and time. She has normal strength and normal reflexes. She displays no tremor. No cranial nerve deficit or sensory deficit. She exhibits normal muscle tone. She displays a negative Romberg sign. Coordination and gait normal.   Reflex Scores:       Tricep reflexes are 2+ on the right side and 2+ on the left side.       Bicep reflexes are 2+ on the right side and 2+ on the left side.       Brachioradialis reflexes are 2+ on the right side and 2+ on the left side.       Patellar reflexes are 2+ on the right side and 2+ on the left side.       " Achilles reflexes are 2+ on the right side and 2+ on the left side.  Awake, alert. No aphasia, no dysarthria  Completes simple and complex commands    CN II:  Visual fields full.  Pupils equally reactive to light  CN III, IV, VI:  Extraocular Muscles full with no signs of nystagmus  CN V:  Facial sensory is symmetric with no asymetries.  CN VII:  Facial motor symmetric  CN VIII:  Gross hearing intact bilaterally  CN IX:  Palate elevates symmetrically  CN X:  Palate elevates symmetrically  CN XI:  Shoulder shrug symmetric  CN XII:  Tongue is midline on protrusion    Full and symmetric strength bilateral upper and lower extremities.   Skin: Skin is warm and dry.   Psychiatric: She has a normal mood and affect. Her speech is normal and behavior is normal. Cognition and memory are normal.   Nursing note and vitals reviewed.           ASSESSMENT/PLAN    Diagnoses and all orders for this visit:    Intractable migraine without aura and without status migrainosus  -     Ambulatory Referral to Neurology    Snoring  -     Overnight Sleep Oximetry Study; Future    Intractable episodic headache, unspecified headache type    Other orders  -     galcanezumab-gnlm (EMGALITY) 120 MG/ML prefilled syringe; Inject 2 mL under the skin into the appropriate area as directed 1 (One) Time for 1 dose.  -     galcanezumab-gnlm (EMGALITY) 120 MG/ML prefilled syringe; Inject 1 mL under the skin into the appropriate area as directed Every 30 (Thirty) Days.      MEDICAL DECISION MAKING:  Patient likely having medication overuse as she admits to taking butalbital daily. Would like to try another CGRP.   1. D/c Ajovy  2. Continue with  Trokendi XR  200 mg   3. patient to try compazine as abortive agent and counseled on side effects  4. will trial Emgality 240 mg today and then 120 mg  monthly and counseled on side effects. Demonstration given on how to perform injection with autoinjector. Counseled on side effects and that may take 3-6 months for  effect.   5. Patient to use fioricet sparingly and to wean off use as she may also be experiencing medication overuse  6. Refer for BOTOX  7.  obtain overnight pulse ox. If patient does have YOAN, patient states she would not be able to tolerate mask. counseled on risk of untreated YOAN.    HPI and ROS reviewed and updated.      allergies and all known medications/prescriptions have been reviewed using resources available on this encounter.    Return in about 3 months (around 7/4/2019).        Era Early, APRN

## 2019-04-05 ENCOUNTER — TELEPHONE (OUTPATIENT)
Dept: PRIMARY CARE CLINIC | Age: 69
End: 2019-04-05

## 2019-04-05 RX ORDER — ACYCLOVIR 50 MG/G
OINTMENT TOPICAL
Qty: 1 TUBE | Refills: 5 | Status: SHIPPED | OUTPATIENT
Start: 2019-04-05 | End: 2019-04-12

## 2019-04-05 NOTE — TELEPHONE ENCOUNTER
Pt has requested Zovirax topical cream to be called in to [facility], [facility]

## 2019-04-25 DIAGNOSIS — G43.711 INTRACTABLE CHRONIC MIGRAINE WITHOUT AURA AND WITH STATUS MIGRAINOSUS: ICD-10-CM

## 2019-04-26 RX ORDER — BUTALBITAL, ACETAMINOPHEN AND CAFFEINE 50; 325; 40 MG/1; MG/1; MG/1
TABLET ORAL
Qty: 45 TABLET | Refills: 0 | Status: SHIPPED | OUTPATIENT
Start: 2019-04-26 | End: 2019-05-28 | Stop reason: SDUPTHER

## 2019-05-28 DIAGNOSIS — G43.711 INTRACTABLE CHRONIC MIGRAINE WITHOUT AURA AND WITH STATUS MIGRAINOSUS: ICD-10-CM

## 2019-05-28 RX ORDER — BUTALBITAL, ACETAMINOPHEN AND CAFFEINE 50; 325; 40 MG/1; MG/1; MG/1
TABLET ORAL
Qty: 45 TABLET | Refills: 0 | Status: SHIPPED | OUTPATIENT
Start: 2019-05-28 | End: 2019-06-26 | Stop reason: SDUPTHER

## 2019-05-29 ENCOUNTER — PROCEDURE VISIT (OUTPATIENT)
Dept: NEUROLOGY | Facility: CLINIC | Age: 69
End: 2019-05-29

## 2019-05-29 VITALS
DIASTOLIC BLOOD PRESSURE: 80 MMHG | HEIGHT: 64 IN | SYSTOLIC BLOOD PRESSURE: 130 MMHG | HEART RATE: 82 BPM | RESPIRATION RATE: 18 BRPM | BODY MASS INDEX: 24.07 KG/M2 | WEIGHT: 141 LBS

## 2019-05-29 DIAGNOSIS — G43.019 INTRACTABLE MIGRAINE WITHOUT AURA AND WITHOUT STATUS MIGRAINOSUS: Primary | ICD-10-CM

## 2019-05-29 PROCEDURE — 64615 CHEMODENERV MUSC MIGRAINE: CPT | Performed by: PSYCHIATRY & NEUROLOGY

## 2019-05-31 RX ORDER — TOPIRAMATE 200 MG/1
CAPSULE, EXTENDED RELEASE ORAL
Qty: 30 CAPSULE | Refills: 5 | Status: SHIPPED | OUTPATIENT
Start: 2019-05-31 | End: 2019-12-02 | Stop reason: SDUPTHER

## 2019-05-31 NOTE — TELEPHONE ENCOUNTER
I did call to let Mrs Gray know that [facility] has approved her Emgality. She did voice understanding.

## 2019-06-07 DIAGNOSIS — F51.04 CHRONIC INSOMNIA: Primary | ICD-10-CM

## 2019-06-07 RX ORDER — ZOLPIDEM TARTRATE 5 MG/1
TABLET ORAL
Qty: 30 TABLET | Refills: 1 | Status: SHIPPED | OUTPATIENT
Start: 2019-06-07 | End: 2019-08-06

## 2019-06-11 ENCOUNTER — HOSPITAL ENCOUNTER (OUTPATIENT)
Dept: WOMENS IMAGING | Age: 69
Discharge: HOME | End: 2019-06-11
Payer: MEDICARE

## 2019-06-11 DIAGNOSIS — Z12.39 BREAST CANCER SCREENING: ICD-10-CM

## 2019-06-11 PROCEDURE — 77063 BREAST TOMOSYNTHESIS BI: CPT

## 2019-06-26 DIAGNOSIS — G43.711 INTRACTABLE CHRONIC MIGRAINE WITHOUT AURA AND WITH STATUS MIGRAINOSUS: ICD-10-CM

## 2019-06-26 RX ORDER — BUTALBITAL, ACETAMINOPHEN AND CAFFEINE 50; 325; 40 MG/1; MG/1; MG/1
TABLET ORAL
Qty: 45 TABLET | Refills: 0 | Status: SHIPPED | OUTPATIENT
Start: 2019-06-26 | End: 2019-10-03 | Stop reason: SDUPTHER

## 2019-07-08 ENCOUNTER — OFFICE VISIT (OUTPATIENT)
Dept: PRIMARY CARE CLINIC | Age: 69
End: 2019-07-08
Payer: MEDICARE

## 2019-07-08 VITALS
BODY MASS INDEX: 22.56 KG/M2 | DIASTOLIC BLOOD PRESSURE: 72 MMHG | RESPIRATION RATE: 18 BRPM | HEIGHT: 65 IN | TEMPERATURE: 97 F | WEIGHT: 135.4 LBS | HEART RATE: 83 BPM | OXYGEN SATURATION: 98 % | SYSTOLIC BLOOD PRESSURE: 122 MMHG

## 2019-07-08 DIAGNOSIS — G43.109 MIGRAINE WITH AURA AND WITHOUT STATUS MIGRAINOSUS, NOT INTRACTABLE: ICD-10-CM

## 2019-07-08 DIAGNOSIS — F51.04 CHRONIC INSOMNIA: ICD-10-CM

## 2019-07-08 DIAGNOSIS — Z51.81 MEDICATION MONITORING ENCOUNTER: Primary | ICD-10-CM

## 2019-07-08 PROCEDURE — 3017F COLORECTAL CA SCREEN DOC REV: CPT | Performed by: FAMILY MEDICINE

## 2019-07-08 PROCEDURE — G8420 CALC BMI NORM PARAMETERS: HCPCS | Performed by: FAMILY MEDICINE

## 2019-07-08 PROCEDURE — 4040F PNEUMOC VAC/ADMIN/RCVD: CPT | Performed by: FAMILY MEDICINE

## 2019-07-08 PROCEDURE — G8399 PT W/DXA RESULTS DOCUMENT: HCPCS | Performed by: FAMILY MEDICINE

## 2019-07-08 PROCEDURE — G8427 DOCREV CUR MEDS BY ELIG CLIN: HCPCS | Performed by: FAMILY MEDICINE

## 2019-07-08 PROCEDURE — 1123F ACP DISCUSS/DSCN MKR DOCD: CPT | Performed by: FAMILY MEDICINE

## 2019-07-08 PROCEDURE — 1090F PRES/ABSN URINE INCON ASSESS: CPT | Performed by: FAMILY MEDICINE

## 2019-07-08 PROCEDURE — 99213 OFFICE O/P EST LOW 20 MIN: CPT | Performed by: FAMILY MEDICINE

## 2019-07-08 PROCEDURE — 1036F TOBACCO NON-USER: CPT | Performed by: FAMILY MEDICINE

## 2019-07-13 ASSESSMENT — ENCOUNTER SYMPTOMS: RESPIRATORY NEGATIVE: 1

## 2019-07-14 NOTE — PROGRESS NOTES
encounter. Continue current medications. We will refill when needed. ORDERS:  No orders of the defined types were placed in this encounter. Eduardo Salinas was done. She is not abusing these medications. We do have a medication agreement. I am not concerned about addiction potential.          Follow-up:  Return in about 6 months (around 1/8/2020) for Physical and fasting blood work. Nichol Galo MD    EMR Dragon/transcription disclaimer:  Muchof this encounter note is electronic transcription/translation of spoken language to printed texts. The electronic translation of spoken language may be erroneous, or at times, nonsensical words or phrases may beinadvertently transcribed.   Although I have reviewed the note for such errors, some may still exist.

## 2019-07-16 ENCOUNTER — OFFICE VISIT (OUTPATIENT)
Dept: NEUROLOGY | Facility: CLINIC | Age: 69
End: 2019-07-16

## 2019-07-16 VITALS
SYSTOLIC BLOOD PRESSURE: 100 MMHG | WEIGHT: 133 LBS | BODY MASS INDEX: 22.71 KG/M2 | OXYGEN SATURATION: 98 % | HEIGHT: 64 IN | DIASTOLIC BLOOD PRESSURE: 78 MMHG | HEART RATE: 90 BPM

## 2019-07-16 DIAGNOSIS — G43.019 INTRACTABLE MIGRAINE WITHOUT AURA AND WITHOUT STATUS MIGRAINOSUS: Primary | ICD-10-CM

## 2019-07-16 DIAGNOSIS — G44.40 MEDICATION OVERUSE HEADACHE: ICD-10-CM

## 2019-07-16 DIAGNOSIS — R51.9 CHRONIC INTRACTABLE HEADACHE, UNSPECIFIED HEADACHE TYPE: ICD-10-CM

## 2019-07-16 DIAGNOSIS — G89.29 CHRONIC INTRACTABLE HEADACHE, UNSPECIFIED HEADACHE TYPE: ICD-10-CM

## 2019-07-16 PROCEDURE — 99213 OFFICE O/P EST LOW 20 MIN: CPT | Performed by: CLINICAL NURSE SPECIALIST

## 2019-07-16 RX ORDER — RIZATRIPTAN BENZOATE 5 MG/1
TABLET ORAL
Qty: 9 TABLET | Refills: 2 | Status: SHIPPED | OUTPATIENT
Start: 2019-07-16 | End: 2020-07-09

## 2019-07-16 NOTE — PROGRESS NOTES
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 She is by herself today. She was last seen 4/4/2019. She is ambulating unassisted. She was started on Emgality 120 mg monthly and denies injectin site reactions. She also started BOTOX 5/29/19. She has continued with rokendi  mg daily. Does complain of altered taste and at times word finding problems. The goal is eventually stopping Trokendi XR secondary to side effects. Despite multiple times of advising to stop bultalbitol she continue to take daily. Since April 2019 has received 3 injections of Emgality. No injection site reaction. Received Botox 5/29/19. Was having some HA free days after Emgality and since BOTOX reports having several HA free days typically a week at a time but then will have HA remainder of the month.  HA having the same duration of starting in the morning and gone by 2 PM. Reports intensity same. Has had a daily HA for the last 2-3 weeks and has identified weather as a trigger.   She was to have overnight pulse ox but did not have done and advised again today that looking for other correctable cause of chronic HA.     As you recall, she had tried Ajovy and did one injection. Denied side effects. She did not continue even after being counseled that may take 3-6 months to see benefit. S. She does appear to have chronic migraine and episodic type HA as well. States will some times wake with a HA. She does snore. States she does not think she would be able to tolerate CPAP.   She was prescribed compazine as abortive agent and never tried.       EPWORTH= 8, STOP-BANG = INTERMEDIATE      Headache    This is a chronic problem. The current episode started more than 1 year ago. The problem occurs daily. The problem has been rapidly improving. The pain is located in the  bilateral region. The pain quality is similar to prior headaches. Associated symptoms include nausea. Pertinent negatives include no coughing, dizziness, hearing loss, phonophobia, photophobia, sinus pressure or vomiting. The symptoms are aggravated by weather changes. Treatments tried: trokendi XR, butalbitol. The treatment provided significant relief. Her past medical history is significant for hypertension and migraines in the family (mother, daughter).   Migraine    This is a chronic (saw a neurologist about 10 years ago) problem. The current episode started more than 1 year ago (since early 40s). The problem occurs daily (wake with the HA and usually gone by 1500). The pain is located in the bilateral, occipital and parietal region. The pain does not radiate. The pain quality is similar to prior headaches. The quality of the pain is described as dull. The pain is at a severity of 6/10. The pain is severe. Associated symptoms include nausea. Pertinent negatives include no coughing, dizziness, hearing loss, phonophobia, photophobia, sinus pressure or vomiting. Associated symptoms comments: Tunnel vision. The symptoms are aggravated by hunger (was caffiene abuser but still has 12 oz Mt. Dew daily). She has tried acetaminophen, NSAIDs and cold packs (food will make temporarily go away, fioricet, ibuprofen 4 times a day, topamax, imitrex) for the symptoms. Her past medical history is significant for hypertension and migraines in the family (mother, daughter).        Current Outpatient Medications   Medication Sig Dispense Refill   • butalbital-acetaminophen-caffeine (FIORICET, ESGIC) -40 MG per tablet Take 1 tablet by mouth Every 4 (Four) Hours As Needed for Headache.     • Cholecalciferol (VITAMIN D PO) Take  by mouth Daily.     • galcanezumab-gnlm (EMGALITY) 120 MG/ML prefilled syringe Inject 1 mL under the skin into the appropriate area as directed Every 30 (Thirty) Days. 1 pen 5   • Lactobacillus  (ACIDOPHILUS PO) Take  by mouth Daily.     • LYSINE PO Take  by mouth Daily.     • MAGNESIUM PO Take  by mouth Daily.     • moexipril (UNIVASC) 15 MG tablet Take 15 mg by mouth Every Night.     • Multiple Vitamins-Minerals (MULTIVITAMIN WITH MINERALS) tablet tablet Take 1 tablet by mouth Daily.     • omeprazole (priLOSEC) 40 MG capsule Take 40 mg by mouth As Needed.     • OnabotulinumtoxinA 200 units reconstituted solution MD to inject 155U into head every 12 weeks for Chronic Migraine in MD office 1 each 4   • ondansetron (ZOFRAN) 4 MG tablet Take 4 mg by mouth Every 8 (Eight) Hours As Needed for Nausea or Vomiting.     • prochlorperazine (COMPAZINE) 5 MG tablet Take 1 tablet by mouth Every 8 (Eight) Hours As Needed for Nausea or Vomiting. 20 tablet 1   • rosuvastatin (CRESTOR) 5 MG tablet TAKE ONE TABLET BY MOUTH AT BEDTIME.     • TROKENDI  MG capsule sustained-release 24 hr TAKE 1 CAPSULE BY MOUTH AT BEDTIME 30 capsule 5   • valACYclovir (VALTREX) 500 MG tablet Take 1,000 mg by mouth 2 (Two) Times a Day As Needed.     • zolpidem (AMBIEN) 5 MG tablet Take 5 mg by mouth. Take one half tab at bedtime     • rizatriptan (MAXALT) 5 MG tablet Take 1 tablet at onset of HA. May repeat in 2 hours. No more than 2 tablets in 24 hours. 9 tablet 2     No current facility-administered medications for this visit.        Past Medical History:   Diagnosis Date   • Headache    • Migraine        Past Surgical History:   Procedure Laterality Date   • KNEE SURGERY         family history includes No Known Problems in her father and mother.    Social History     Tobacco Use   • Smoking status: Never Smoker   • Smokeless tobacco: Never Used   Substance Use Topics   • Alcohol use: No   • Drug use: No       Review of Systems   Constitutional: Negative.  Negative for fatigue.   HENT: Negative for hearing loss and sinus pressure.    Eyes: Negative.  Negative for photophobia.   Respiratory: Negative.  Negative for apnea and cough.   "  Cardiovascular: Negative.  Negative for leg swelling.   Gastrointestinal: Positive for nausea. Negative for vomiting.   Endocrine: Negative.  Negative for cold intolerance and heat intolerance.   Genitourinary: Negative.  Negative for dysuria and frequency.   Musculoskeletal: Negative for arthralgias, gait problem and myalgias.        Hx of bilateral knee replacment.   Skin: Negative.    Allergic/Immunologic: Negative.    Neurological: Positive for headaches. Negative for dizziness.   Hematological: Negative.    Psychiatric/Behavioral: Negative.  Negative for agitation, confusion and hallucinations.   All other systems reviewed and are negative.      Objective     /78 (BP Location: Left arm, Patient Position: Sitting, Cuff Size: Adult)   Pulse 90   Ht 162.6 cm (64\")   Wt 60.3 kg (133 lb)   SpO2 98%   BMI 22.83 kg/m² , Body mass index is 22.83 kg/m².    Physical Exam   Constitutional: She is oriented to person, place, and time. Vital signs are normal. She appears well-developed and well-nourished. She is cooperative.   HENT:   Head: Normocephalic and atraumatic.   Right Ear: Hearing and external ear normal.   Left Ear: Hearing and external ear normal.   Nose: Nose normal.   Mouth/Throat: Oropharynx is clear and moist.   Eyes: Conjunctivae, EOM and lids are normal. Pupils are equal, round, and reactive to light. Right eye exhibits normal extraocular motion and no nystagmus. Left eye exhibits normal extraocular motion and no nystagmus. Right pupil is round and reactive. Left pupil is round and reactive. Pupils are equal.   Neck: Normal range of motion. Neck supple. Carotid bruit is not present.   Cardiovascular: Normal rate, regular rhythm and normal heart sounds.   No murmur heard.  Pulmonary/Chest: Effort normal and breath sounds normal. She has no decreased breath sounds. She has no wheezes. She has no rhonchi. She has no rales.   Abdominal: Soft. Bowel sounds are normal.   Musculoskeletal: Normal range " of motion.   Neurological: She is alert and oriented to person, place, and time. She has normal strength and normal reflexes. She displays no tremor. No cranial nerve deficit or sensory deficit. She exhibits normal muscle tone. She displays a negative Romberg sign. Coordination and gait normal.   Reflex Scores:       Tricep reflexes are 2+ on the right side and 2+ on the left side.       Bicep reflexes are 2+ on the right side and 2+ on the left side.       Brachioradialis reflexes are 2+ on the right side and 2+ on the left side.       Patellar reflexes are 2+ on the right side and 2+ on the left side.       Achilles reflexes are 2+ on the right side and 2+ on the left side.  Awake, alert. No aphasia, no dysarthria  Completes simple and complex commands    CN II:  Visual fields full.  Pupils equally reactive to light  CN III, IV, VI:  Extraocular Muscles full with no signs of nystagmus  CN V:  Facial sensory is symmetric with no asymetries.  CN VII:  Facial motor symmetric  CN VIII:  Gross hearing intact bilaterally  CN IX:  Palate elevates symmetrically  CN X:  Palate elevates symmetrically  CN XI:  Shoulder shrug symmetric  CN XII:  Tongue is midline on protrusion    Full and symmetric strength bilateral upper and lower extremities.   Skin: Skin is warm and dry.   Psychiatric: She has a normal mood and affect. Her speech is normal and behavior is normal. Cognition and memory are normal.   Nursing note and vitals reviewed.           ASSESSMENT/PLAN    Diagnoses and all orders for this visit:    Intractable migraine without aura and without status migrainosus    Chronic intractable headache, unspecified headache type    Medication overuse headache    Other orders  -     rizatriptan (MAXALT) 5 MG tablet; Take 1 tablet at onset of HA. May repeat in 2 hours. No more than 2 tablets in 24 hours.      MEDICAL DECISION MAKING:  Patient likely having medication overuse as she admits to taking butalbital daily. Counseled  extensively to discontinue all NSAIDS and butalbitol for least 6 weeks but recommend she discontinue all together.   1. patient to try Compazine as abortive agent as she has never tried since prescribed.   2. Continue with  Trokendi XR  200 mg . Goal is to decrease dose but would like to discontiue all together secondary to side effects.  3. will try maxal as abortive agent and counseled on side effects.  4.cotninue  Emgality  120 mg  monthly and counseled on side effects. Demonstration given on how to perform injection with autoinjector. Counseled on side effects and that may take 3-6 months for effect.   5.continue BOTOX  6.  obtain overnight pulse ox. If patient does have YOAN, patient states she would not be able to tolerate mask. counseled on risk of untreated YOAN. And again counseled would like to see if there is a correctable factor contributing to her HAs   7. Patient's Body mass index is 22.83 kg/m². BMI is within normal parameters. No follow-up required..      HPI and ROS reviewed and updated.      allergies and all known medications/prescriptions have been reviewed using resources available on this encounter.    Return in about 3 months (around 10/16/2019).        MARGO Lakhani

## 2019-07-25 ENCOUNTER — OFFICE VISIT (OUTPATIENT)
Dept: PRIMARY CARE CLINIC | Age: 69
End: 2019-07-25
Payer: MEDICARE

## 2019-07-25 VITALS
HEART RATE: 91 BPM | WEIGHT: 136 LBS | OXYGEN SATURATION: 98 % | DIASTOLIC BLOOD PRESSURE: 76 MMHG | TEMPERATURE: 98.6 F | SYSTOLIC BLOOD PRESSURE: 118 MMHG | HEIGHT: 64 IN | RESPIRATION RATE: 22 BRPM | BODY MASS INDEX: 23.22 KG/M2

## 2019-07-25 DIAGNOSIS — N81.10 FEMALE CYSTOCELE: Primary | ICD-10-CM

## 2019-07-25 PROCEDURE — 99213 OFFICE O/P EST LOW 20 MIN: CPT | Performed by: FAMILY MEDICINE

## 2019-07-25 PROCEDURE — 1123F ACP DISCUSS/DSCN MKR DOCD: CPT | Performed by: FAMILY MEDICINE

## 2019-07-25 PROCEDURE — G8399 PT W/DXA RESULTS DOCUMENT: HCPCS | Performed by: FAMILY MEDICINE

## 2019-07-25 PROCEDURE — 1036F TOBACCO NON-USER: CPT | Performed by: FAMILY MEDICINE

## 2019-07-25 PROCEDURE — G8427 DOCREV CUR MEDS BY ELIG CLIN: HCPCS | Performed by: FAMILY MEDICINE

## 2019-07-25 PROCEDURE — G8420 CALC BMI NORM PARAMETERS: HCPCS | Performed by: FAMILY MEDICINE

## 2019-07-25 PROCEDURE — 4040F PNEUMOC VAC/ADMIN/RCVD: CPT | Performed by: FAMILY MEDICINE

## 2019-07-25 PROCEDURE — 3017F COLORECTAL CA SCREEN DOC REV: CPT | Performed by: FAMILY MEDICINE

## 2019-07-25 PROCEDURE — 1090F PRES/ABSN URINE INCON ASSESS: CPT | Performed by: FAMILY MEDICINE

## 2019-07-25 ASSESSMENT — ENCOUNTER SYMPTOMS
ABDOMINAL PAIN: 0
COUGH: 0
NAUSEA: 0
WHEEZING: 0
VOMITING: 0
COLOR CHANGE: 0
EYE DISCHARGE: 0
DIARRHEA: 0
BACK PAIN: 0

## 2019-08-08 ENCOUNTER — TELEPHONE (OUTPATIENT)
Dept: NEUROLOGY | Facility: CLINIC | Age: 69
End: 2019-08-08

## 2019-08-08 NOTE — TELEPHONE ENCOUNTER
I did call Mrs Gray to let her know that I was not able to order her Botox for her next Botox injection on 8/21/19. I was not able to speak with her but I did leave a message with my name and telephone number with a request she please call me.

## 2019-08-09 NOTE — TELEPHONE ENCOUNTER
Vincent left a message that she is at a retreat with her [employer].  She has been leaving her phone off.  She ordered the Botox and was told it would be here on time for her next appointment.

## 2019-08-21 ENCOUNTER — PROCEDURE VISIT (OUTPATIENT)
Dept: NEUROLOGY | Facility: CLINIC | Age: 69
End: 2019-08-21

## 2019-08-21 VITALS
SYSTOLIC BLOOD PRESSURE: 132 MMHG | BODY MASS INDEX: 23.56 KG/M2 | DIASTOLIC BLOOD PRESSURE: 80 MMHG | HEART RATE: 85 BPM | WEIGHT: 138 LBS | OXYGEN SATURATION: 99 % | HEIGHT: 64 IN | RESPIRATION RATE: 16 BRPM

## 2019-08-21 DIAGNOSIS — G43.019 INTRACTABLE MIGRAINE WITHOUT AURA AND WITHOUT STATUS MIGRAINOSUS: Primary | ICD-10-CM

## 2019-08-21 PROCEDURE — 64615 CHEMODENERV MUSC MIGRAINE: CPT | Performed by: PSYCHIATRY & NEUROLOGY

## 2019-09-18 DIAGNOSIS — F51.04 CHRONIC INSOMNIA: Primary | ICD-10-CM

## 2019-09-18 RX ORDER — ZOLPIDEM TARTRATE 5 MG/1
TABLET ORAL
Qty: 30 TABLET | Refills: 0 | Status: SHIPPED | OUTPATIENT
Start: 2019-09-18 | End: 2019-10-18

## 2019-10-03 DIAGNOSIS — G43.711 INTRACTABLE CHRONIC MIGRAINE WITHOUT AURA AND WITH STATUS MIGRAINOSUS: ICD-10-CM

## 2019-10-04 RX ORDER — BUTALBITAL, ACETAMINOPHEN AND CAFFEINE 50; 325; 40 MG/1; MG/1; MG/1
TABLET ORAL
Qty: 45 TABLET | Refills: 0 | Status: SHIPPED | OUTPATIENT
Start: 2019-10-04 | End: 2019-11-25 | Stop reason: SDUPTHER

## 2019-10-24 ENCOUNTER — TELEPHONE (OUTPATIENT)
Dept: PRIMARY CARE CLINIC | Age: 69
End: 2019-10-24

## 2019-10-28 ENCOUNTER — NURSE ONLY (OUTPATIENT)
Dept: PRIMARY CARE CLINIC | Age: 69
End: 2019-10-28

## 2019-10-28 DIAGNOSIS — Z46.6 ENCOUNTER FOR FOLEY CATHETER REMOVAL: Primary | ICD-10-CM

## 2019-11-13 RX ORDER — LISINOPRIL 20 MG/1
TABLET ORAL
Qty: 90 TABLET | Refills: 3 | Status: SHIPPED | OUTPATIENT
Start: 2019-11-13 | End: 2020-10-14

## 2019-11-25 DIAGNOSIS — G43.711 INTRACTABLE CHRONIC MIGRAINE WITHOUT AURA AND WITH STATUS MIGRAINOSUS: ICD-10-CM

## 2019-11-25 RX ORDER — BUTALBITAL, ACETAMINOPHEN AND CAFFEINE 50; 325; 40 MG/1; MG/1; MG/1
TABLET ORAL
Qty: 45 TABLET | Refills: 0 | Status: SHIPPED | OUTPATIENT
Start: 2019-11-25 | End: 2020-01-14

## 2019-11-26 ENCOUNTER — OFFICE VISIT (OUTPATIENT)
Dept: NEUROLOGY | Facility: CLINIC | Age: 69
End: 2019-11-26

## 2019-11-26 VITALS
HEART RATE: 90 BPM | WEIGHT: 137 LBS | BODY MASS INDEX: 23.39 KG/M2 | DIASTOLIC BLOOD PRESSURE: 70 MMHG | HEIGHT: 64 IN | SYSTOLIC BLOOD PRESSURE: 110 MMHG | OXYGEN SATURATION: 98 %

## 2019-11-26 DIAGNOSIS — G43.019 INTRACTABLE MIGRAINE WITHOUT AURA AND WITHOUT STATUS MIGRAINOSUS: Primary | ICD-10-CM

## 2019-11-26 PROCEDURE — 99214 OFFICE O/P EST MOD 30 MIN: CPT | Performed by: PHYSICIAN ASSISTANT

## 2019-11-26 RX ORDER — LISINOPRIL 20 MG/1
10 TABLET ORAL DAILY
COMMUNITY
Start: 2019-11-13

## 2019-11-26 RX ORDER — LEVOFLOXACIN 750 MG/1
750 TABLET ORAL DAILY
Refills: 0 | COMMUNITY
Start: 2019-10-13 | End: 2020-07-09

## 2019-11-26 NOTE — PROGRESS NOTES
Neurology Progress Note      Chief Complaint:    Migraine headache disorder    Subjective     Subjective:  This is a very pleasant 69-year-old female well-known to me personally who has a longstanding history of migraine headache disorder.  The patient is presently undergoing Botox injections with Dr. Alvarze and is scheduled to undergo her third series tomorrow on 11/27/2019.  The patient is treated concurrently with Trokendi  mg daily, Emgality 120 mg monthly and also has the available use of Maxalt, Fioricet, Compazine and Zofran as abortive agents.  Letter, the patient has had no need for these latter medications as she is had near complete resolution of the severe, debilitating migrainous-type headaches with accommodation of Emgality, Trokendi and Botox.    The patient has chronic cervical and cervical myofascial pain with limited range of motion.  She attributes this to age and arthritis, however, has not specifically entertained therapy specific to her suspected underlying cervical degenerative disc disease.  Overall, the patient feels very satisfied with her response to the prescribed therapies and while she still has frequent sometimes daily headaches, these are not as severe or debilitating as they had once been for her.  She is very pleased with her current headache management.      Past Medical History:   Diagnosis Date   • Headache    • Migraine      Past Surgical History:   Procedure Laterality Date   • KNEE SURGERY       Family History   Problem Relation Age of Onset   • No Known Problems Mother    • No Known Problems Father      Social History     Tobacco Use   • Smoking status: Never Smoker   • Smokeless tobacco: Never Used   Substance Use Topics   • Alcohol use: No   • Drug use: No       Medications:  Current Outpatient Medications   Medication Sig Dispense Refill   • butalbital-acetaminophen-caffeine (FIORICET, ESGIC) -40 MG per tablet Take 1 tablet by mouth Every 4 (Four) Hours As  Needed for Headache.     • Cholecalciferol (VITAMIN D PO) Take  by mouth Daily.     • galcanezumab-gnlm (EMGALITY) 120 MG/ML prefilled syringe Inject 1 mL under the skin into the appropriate area as directed Every 30 (Thirty) Days. 1 pen 5   • Lactobacillus (ACIDOPHILUS PO) Take  by mouth Daily.     • levoFLOXacin (LEVAQUIN) 750 MG tablet Take 750 mg by mouth Daily.  0   • lisinopril (PRINIVIL,ZESTRIL) 20 MG tablet TAKE (1/2) TABLET BY MOUTH TWICE DAILY FOR BLOOD PRESSURE.     • LYSINE PO Take  by mouth Daily.     • MAGNESIUM PO Take  by mouth Daily.     • moexipril (UNIVASC) 15 MG tablet Take 15 mg by mouth Every Night.     • Multiple Vitamins-Minerals (MULTIVITAMIN WITH MINERALS) tablet tablet Take 1 tablet by mouth Daily.     • omeprazole (priLOSEC) 40 MG capsule Take 40 mg by mouth As Needed.     • OnabotulinumtoxinA 200 units reconstituted solution MD to inject 155U into head every 12 weeks for Chronic Migraine in MD office 1 each 4   • ondansetron (ZOFRAN) 4 MG tablet Take 4 mg by mouth Every 8 (Eight) Hours As Needed for Nausea or Vomiting.     • prochlorperazine (COMPAZINE) 5 MG tablet Take 1 tablet by mouth Every 8 (Eight) Hours As Needed for Nausea or Vomiting. 20 tablet 1   • rosuvastatin (CRESTOR) 5 MG tablet TAKE ONE TABLET BY MOUTH AT BEDTIME.     • TROKENDI  MG capsule sustained-release 24 hr TAKE 1 CAPSULE BY MOUTH AT BEDTIME 30 capsule 5   • valACYclovir (VALTREX) 500 MG tablet Take 1,000 mg by mouth 2 (Two) Times a Day As Needed.     • zolpidem (AMBIEN) 5 MG tablet Take 5 mg by mouth. Take one half tab at bedtime     • rizatriptan (MAXALT) 5 MG tablet Take 1 tablet at onset of HA. May repeat in 2 hours. No more than 2 tablets in 24 hours. 9 tablet 2     No current facility-administered medications for this visit.        Allergies:    Atorvastatin    Review of Systems:   -A 14 point review of systems is completed and is negative.      Objective      Vital Signs  Heart Rate:  [90] 90  BP:  (110)/(70) 110/70    Physical Exam:    General Exam:  Head:  Normocephalic, atraumatic.  HEENT: PERRLA.  Full EOM.  Neck:  No lymphadenopathy, thyromegaly or bruit.  Cardiac:  Regular rate and rhythm.  Normal S1, S2.  No murmur, rub or gallop.  Lungs:  Clear to auscultation bilaterally.  No wheeze, rales or rhonchi.  Abdomen:  Non-tender, Non-distended.  Bowel sounds normoactive.  Extremities: Full peripheral pulses.  No clubbing, cyanosis or edema.  Skin: No ulceration, breakdown or rash.      Neurologic Exam:  Mental Status:    -Awake. Alert. Oriented to person, place & time.  -No word finding difficulties.  -No aphasia.  -No dysarthria.  -Follows simple commands.     CN II:  Full visual fields with confrontation.  Pupils equally reactive to light.  CN III, IV, VI:  Extraocular muscles function intact with no nystagmus.  CN V:  Facial sensory is symmetric.  CN VII:  Facial motor symmetric.  CN VIII:  Gross hearing intact bilaterally.  CN IX/X:  Palate elevates symmetrically.  CN XI:  Shoulder shrug symmetric.  CN XII:  Tongue is midline on protrusion.     Motor: (strength out of 5:  1= minimal movement, 2 = movement in plane of gravity, 3 = movement against gravity, 4 = movement against some resistance, 5 = full strength)     -5/5 in bilateral biceps, triceps, brachioradialis, wrist extensors and intrinsic muscles of the hand.    -5/5 in bilateral hip flexors, quadriceps, hamstrings, gastrocsoleus complex, anterior tibialis and extensor hallucis longus.       Deep Tendon Reflexes:  -Right              Bicep: 2+         Triceps: 2+      Brachioradialis: 2+              Patella: 2+       Ankle: 2+         Babinski:  negative  -Left              Bicep: 2+         Triceps: 2+      Brachioradialis: 2+              Patella: 2+       Ankle: 2+         Babinski:  negative     Sensory:  -Intact to light touch, pinprick BUE (C5-T1) and BLE (L2-S1).     Coordination:  -Finger to nose intact BUEs  -Heel to shin intact  BLEs  -No ataxia     Gait  -No signs of ataxia  -ambulates unassisted       Results Review:    I reviewed the patient's new clinical results and findings.      No components found for: A1C  Lab Results   Component Value Date    HDL 58 (L) 10/09/2018    LDL 85 10/09/2018     No components found for: B12  No results found for: TSH    Assessment/Plan     Impression:  1.  Migraine headache disorder  2.  Probable underlying cervical degenerative disc disease      Plan:  1.  Continue with Botox therapy scheduled for tomorrow.  2.  Continue Emgality 120 mg subcu monthly  3.  For now, continue Trokendi  mg daily.  However, over the next 3-6 months, if she has significant mitigation in headache intensity, frequency and duration, I would consider the possibility of bringing her off of the Trokendi as she states that this does cause some adverse cognitive impairment that is quite noticeable for her.  I did explain that in order to do so, I would likely take her back to the short acting topiramate twice daily and then reduce the dose, thereafter.  4.  Recommend an anti-inflammatory diet.  5.  We discussed the episodic use of the abortive agents as well as an outpatient infusion of migraine cocktail should this be needed, however, she is no longer having as severe or debilitating headaches.    Patient will follow-up with me in 3 months for surveillance and at that time may discuss possibility of weaning and possibly discontinuing Trokendi as it is leading to some cognitive impairment with some side effects.  The patient voices understanding and agreement with plan of care.  She will call for any concerns or questions in the interim.  We reviewed pertinent diagnostic studies and the potential risks and benefits of the prescribed regimen of treatment.    We discussed compliance of the prescribed treatment regimen and instructions on medication, therapy, physical activity, etc. and potential for improvement and impact these  have on their healing/recovery and risk reduction in the future.    I spent greater than 25 minutes in direct face to face contact with the patient with greater than 50% of the time being spent in education and counseling.          Sebastien Valencia PA-C  11/26/19  9:47 AM

## 2019-11-27 ENCOUNTER — PROCEDURE VISIT (OUTPATIENT)
Dept: NEUROLOGY | Facility: CLINIC | Age: 69
End: 2019-11-27

## 2019-11-27 VITALS
BODY MASS INDEX: 23.39 KG/M2 | WEIGHT: 137 LBS | DIASTOLIC BLOOD PRESSURE: 72 MMHG | SYSTOLIC BLOOD PRESSURE: 116 MMHG | HEART RATE: 80 BPM | RESPIRATION RATE: 20 BRPM | HEIGHT: 64 IN

## 2019-11-27 DIAGNOSIS — G43.719 INTRACTABLE CHRONIC MIGRAINE WITHOUT AURA AND WITHOUT STATUS MIGRAINOSUS: Primary | ICD-10-CM

## 2019-11-27 PROCEDURE — 64615 CHEMODENERV MUSC MIGRAINE: CPT | Performed by: PSYCHIATRY & NEUROLOGY

## 2019-12-02 RX ORDER — TOPIRAMATE 200 MG/1
CAPSULE, EXTENDED RELEASE ORAL
Qty: 30 CAPSULE | Refills: 5 | Status: SHIPPED | OUTPATIENT
Start: 2019-12-02 | End: 2020-05-28 | Stop reason: SDUPTHER

## 2020-01-14 ENCOUNTER — NURSE ONLY (OUTPATIENT)
Dept: PRIMARY CARE CLINIC | Age: 70
End: 2020-01-14
Payer: MEDICARE

## 2020-01-14 LAB
ALBUMIN SERPL-MCNC: 4.5 G/DL (ref 3.5–5.2)
ALP BLD-CCNC: 82 U/L (ref 35–104)
ALT SERPL-CCNC: 27 U/L (ref 5–33)
ANION GAP SERPL CALCULATED.3IONS-SCNC: 13 MMOL/L (ref 7–19)
AST SERPL-CCNC: 19 U/L (ref 5–32)
BASOPHILS ABSOLUTE: 0.1 K/UL (ref 0–0.2)
BASOPHILS RELATIVE PERCENT: 1 % (ref 0–1)
BILIRUB SERPL-MCNC: 0.5 MG/DL (ref 0.2–1.2)
BUN BLDV-MCNC: 20 MG/DL (ref 8–23)
CALCIUM SERPL-MCNC: 9.7 MG/DL (ref 8.8–10.2)
CHLORIDE BLD-SCNC: 104 MMOL/L (ref 98–111)
CHOLESTEROL, TOTAL: 202 MG/DL (ref 160–199)
CO2: 23 MMOL/L (ref 22–29)
CREAT SERPL-MCNC: 0.7 MG/DL (ref 0.5–0.9)
EOSINOPHILS ABSOLUTE: 0.2 K/UL (ref 0–0.6)
EOSINOPHILS RELATIVE PERCENT: 3.4 % (ref 0–5)
GFR NON-AFRICAN AMERICAN: >60
GLUCOSE BLD-MCNC: 106 MG/DL (ref 74–109)
HCT VFR BLD CALC: 47.6 % (ref 37–47)
HDLC SERPL-MCNC: 61 MG/DL (ref 65–121)
HEMOGLOBIN: 14.4 G/DL (ref 12–16)
IMMATURE GRANULOCYTES #: 0 K/UL
LDL CHOLESTEROL CALCULATED: 110 MG/DL
LYMPHOCYTES ABSOLUTE: 2.3 K/UL (ref 1.1–4.5)
LYMPHOCYTES RELATIVE PERCENT: 46.5 % (ref 20–40)
MCH RBC QN AUTO: 29 PG (ref 27–31)
MCHC RBC AUTO-ENTMCNC: 30.3 G/DL (ref 33–37)
MCV RBC AUTO: 96 FL (ref 81–99)
MONOCYTES ABSOLUTE: 0.4 K/UL (ref 0–0.9)
MONOCYTES RELATIVE PERCENT: 8.6 % (ref 0–10)
NEUTROPHILS ABSOLUTE: 2 K/UL (ref 1.5–7.5)
NEUTROPHILS RELATIVE PERCENT: 40.3 % (ref 50–65)
PDW BLD-RTO: 12.3 % (ref 11.5–14.5)
PLATELET # BLD: 227 K/UL (ref 130–400)
PMV BLD AUTO: 9.9 FL (ref 9.4–12.3)
POTASSIUM SERPL-SCNC: 4.2 MMOL/L (ref 3.5–5)
RBC # BLD: 4.96 M/UL (ref 4.2–5.4)
SODIUM BLD-SCNC: 140 MMOL/L (ref 136–145)
TOTAL PROTEIN: 6.9 G/DL (ref 6.6–8.7)
TRIGL SERPL-MCNC: 153 MG/DL (ref 0–149)
VITAMIN D 25-HYDROXY: 41.2 NG/ML
WBC # BLD: 5 K/UL (ref 4.8–10.8)

## 2020-01-14 PROCEDURE — 36415 COLL VENOUS BLD VENIPUNCTURE: CPT | Performed by: FAMILY MEDICINE

## 2020-01-14 RX ORDER — ROSUVASTATIN CALCIUM 5 MG/1
TABLET, COATED ORAL
Qty: 30 TABLET | Refills: 3 | Status: SHIPPED | OUTPATIENT
Start: 2020-01-14 | End: 2020-07-14

## 2020-01-14 RX ORDER — BUTALBITAL, ACETAMINOPHEN AND CAFFEINE 50; 325; 40 MG/1; MG/1; MG/1
TABLET ORAL
Qty: 45 TABLET | Refills: 0 | Status: SHIPPED | OUTPATIENT
Start: 2020-01-14 | End: 2020-02-17

## 2020-01-21 ENCOUNTER — OFFICE VISIT (OUTPATIENT)
Dept: PRIMARY CARE CLINIC | Age: 70
End: 2020-01-21
Payer: MEDICARE

## 2020-01-21 VITALS
HEART RATE: 86 BPM | SYSTOLIC BLOOD PRESSURE: 110 MMHG | RESPIRATION RATE: 16 BRPM | TEMPERATURE: 97.9 F | DIASTOLIC BLOOD PRESSURE: 70 MMHG | BODY MASS INDEX: 24.58 KG/M2 | OXYGEN SATURATION: 97 % | WEIGHT: 143.2 LBS

## 2020-01-21 PROCEDURE — 1090F PRES/ABSN URINE INCON ASSESS: CPT | Performed by: FAMILY MEDICINE

## 2020-01-21 PROCEDURE — 4040F PNEUMOC VAC/ADMIN/RCVD: CPT | Performed by: FAMILY MEDICINE

## 2020-01-21 PROCEDURE — G8420 CALC BMI NORM PARAMETERS: HCPCS | Performed by: FAMILY MEDICINE

## 2020-01-21 PROCEDURE — 1036F TOBACCO NON-USER: CPT | Performed by: FAMILY MEDICINE

## 2020-01-21 PROCEDURE — G8399 PT W/DXA RESULTS DOCUMENT: HCPCS | Performed by: FAMILY MEDICINE

## 2020-01-21 PROCEDURE — G8427 DOCREV CUR MEDS BY ELIG CLIN: HCPCS | Performed by: FAMILY MEDICINE

## 2020-01-21 PROCEDURE — 99214 OFFICE O/P EST MOD 30 MIN: CPT | Performed by: FAMILY MEDICINE

## 2020-01-21 PROCEDURE — G8484 FLU IMMUNIZE NO ADMIN: HCPCS | Performed by: FAMILY MEDICINE

## 2020-01-21 PROCEDURE — 1123F ACP DISCUSS/DSCN MKR DOCD: CPT | Performed by: FAMILY MEDICINE

## 2020-01-21 PROCEDURE — 3017F COLORECTAL CA SCREEN DOC REV: CPT | Performed by: FAMILY MEDICINE

## 2020-01-21 NOTE — PROGRESS NOTES
SUBJECTIVE:   77 y.o. female for annual routine Pap and checkup. She recently had a pelvic floor reconstruction done so we will not do a pelvic exam today. Medicare will not pay for an annual checkup but she has multiple problems we follow including essential hypertension history of low white count, vitamin D deficiency and elevated cholesterol. Labs were done earlier and vitamin D was normal. She was not anemic. White count was normal. Triglycerides were 153. HDL was 61. Total cholesterol 202. She sees Dr. Yael Laura for her chronic migraines. She is on trokendi, engality and Botox. She says they are better than they were worse in the past.    Overall she feels good. She does have some arthritis pain in her knees. She tries to stay active but it's been very difficult since her surgery 3 months ago. Patient Active Problem List    Diagnosis Date Noted    Migraine with aura and without status migrainosus, not intractable 07/09/2018    Osteoarthritis     Solar keratosis     Essential hypertension     Headache     Chronic insomnia     Depression     Leukocytopenia     Colon polyps     Vitamin D deficiency     Mononucleosis      Current Outpatient Medications   Medication Sig Dispense Refill    rosuvastatin (CRESTOR) 5 MG tablet TAKE (1) TABLET BY MOUTH DAILY AT BEDTIME. 30 tablet 3    butalbital-acetaminophen-caffeine (FIORICET, ESGIC) -40 MG per tablet TAKE 1 TABLET BY MOUTH EVERY 6 HOURS AS NEEDED FOR HEADACHE *USE SPARINGLY* 45 tablet 0    lisinopril (PRINIVIL;ZESTRIL) 20 MG tablet TAKE (1/2) TABLET BY MOUTH TWICE DAILY FOR BLOOD PRESSURE. 90 tablet 3    zolpidem (AMBIEN) 5 MG tablet TAKE ONE TABLET BY MOUTH AT BEDTIME.  30 tablet 2    omeprazole (PRILOSEC) 40 MG delayed release capsule TAKE 1 CAPSULE BY MOUTH ONCE DAILY 90 capsule 3    OnabotulinumtoxinA (BOTOX IJ) Inject as directed Indications: Treatment to Prevent Migraine Headaches      Galcanezumab-gnlm (EMGALITY) 120 MG/ML SOSY Inject 120 mg into the skin every 30 days      topiramate ER (TROKENDI XR) 200 MG CP24 Take by mouth      VITAMIN D, CHOLECALCIFEROL, PO Take by mouth      Lactobacillus Acidophilus POWD Take by mouth      Multiple Vitamins-Minerals (THERAPEUTIC MULTIVITAMIN-MINERALS) tablet Take 1 tablet by mouth daily      moexipril (UNIVASC) 15 MG tablet Take 15 mg by mouth nightly       No current facility-administered medications for this visit. Past Medical History:   Diagnosis Date    Chronic insomnia     Colon polyps     Depression     Headache(784.0)     Hypertension     Leukocytopenia     Mononucleosis     Osteoarthritis     Solar keratosis     Status post right partial knee replacement 09/08/2014        Vitamin D deficiency      Past Surgical History:   Procedure Laterality Date    KNEE SURGERY Right September 8, 2014    Partial knee replacement: Dr Ashley Diego       Family History   Problem Relation Age of Onset    High Blood Pressure Mother     Cancer Mother         breast    High Blood Pressure Father     Cancer Brother         throat     Social History     Tobacco Use    Smoking status: Never Smoker    Smokeless tobacco: Never Used   Substance Use Topics    Alcohol use: No       Allergies: Lipitor [atorvastatin]  No LMP recorded. Patient is postmenopausal.    ROS:  Feeling well. No dyspnea or chest pain on exertion. No abdominal pain, change in bowel habits, black or bloody stools. No urinary tract symptoms. GYN ROS: none   No neurological complaints. OBJECTIVE:   The patient appears well, alert, oriented x 3, in no distress. /70 (Site: Right Upper Arm, Position: Sitting, Cuff Size: Medium Adult)   Pulse 86   Temp 97.9 °F (36.6 °C) (Temporal)   Resp 16   Wt 143 lb 3.2 oz (65 kg)   SpO2 97%   Breastfeeding No   BMI 24.58 kg/m²   Skin normal, no suspicious skin lesions. ENT normal.  Neck supple.  No adenopathy or thyromegaly. CHUCK. Lungs are clear, good air entry, no wheezes, rhonchi or rales. S1 and S2 normal, no murmurs, regular rate and rhythm. Abdomen soft without tenderness, guarding, mass or organomegaly. Extremities show no edema, normal peripheral pulses. Neurological is normal, no focal findings. Psychiatric exam, no signs of depression. BREAST EXAM: Breasts symmetrical. No skin lesions. Nipples appear normal without discharge. No masses or axillary lymphadenopathy. No tenderness. PELVIC EXAM: Not examined in light of her hysterectomy    ASSESSMENT:   1. Essential hypertension    2. Vitamin D deficiency    3. Chronic insomnia    4. Migraine with aura and without status migrainosus, not intractable    5. Mixed hyperlipidemia        PLAN:   MEDICATIONS:  No orders of the defined types were placed in this encounter. Continue current medications. We will refill when needed. We did discuss the fact that butalbital can be addicting. Dr. Leslie Holcomb prescribes all of her other migraine medicine so he may need to fill this one also. Medication monitoring    ORDERS:  No orders of the defined types were placed in this encounter. I reviewed all of her labs from January 14, 2020. White count normal. No anemia. I'm not concerned about the minor abnormalities in the CBC. CMP was normal. Vitamin D was normal. Total cholesterol elevated 202 with an HDL of 61. Triglycerides 153. Follow-up:  Return in about 6 months (around 7/21/2020) for Medication monitoring. PATIENT INSTRUCTIONS:  Patient Instructions   We are committed to providing you with the best care possible. In order to help us achieve these goals please remember to bring all medications, herbal products, and over the counter supplements with you to each visit. If your provider has ordered testing for you, please be sure to follow up with our office if you have not received results within 7 days after the testing took place.      *If you receive a survey after visiting one of our offices, please take time to share your experience concerning your physician office visit. These surveys are confidential and no health information about you is shared. We are eager to improve for you and we are counting on your feedback to help make that happen. EMR Dragon/transcription disclaimer:  Much of this encounter note is electronic transcription/translation of spoken language to printed texts. The electronic translation of spoken language may be erroneous, or at times, nonsensical words or phrases may be inadvertently transcribed.   Although I have reviewed the note for such errors, some may still exist.

## 2020-01-23 RX ORDER — VALACYCLOVIR HYDROCHLORIDE 1 G/1
TABLET, FILM COATED ORAL
Qty: 30 TABLET | Refills: 0 | Status: SHIPPED | OUTPATIENT
Start: 2020-01-23 | End: 2020-07-22

## 2020-02-03 ENCOUNTER — TELEPHONE (OUTPATIENT)
Dept: PRIMARY CARE CLINIC | Age: 70
End: 2020-02-03

## 2020-02-07 ENCOUNTER — TELEPHONE (OUTPATIENT)
Dept: PRIMARY CARE CLINIC | Age: 70
End: 2020-02-07

## 2020-02-17 RX ORDER — BUTALBITAL, ACETAMINOPHEN AND CAFFEINE 50; 325; 40 MG/1; MG/1; MG/1
TABLET ORAL
Qty: 45 TABLET | Refills: 0 | Status: SHIPPED | OUTPATIENT
Start: 2020-02-17 | End: 2020-03-27

## 2020-03-26 RX ORDER — GALCANEZUMAB 120 MG/ML
INJECTION, SOLUTION SUBCUTANEOUS
Qty: 1 PEN | Refills: 0 | Status: SHIPPED | OUTPATIENT
Start: 2020-03-26 | End: 2020-04-30 | Stop reason: SDUPTHER

## 2020-03-27 RX ORDER — BUTALBITAL, ACETAMINOPHEN AND CAFFEINE 50; 325; 40 MG/1; MG/1; MG/1
TABLET ORAL
Qty: 45 TABLET | Refills: 0 | Status: SHIPPED | OUTPATIENT
Start: 2020-03-27 | End: 2020-04-28

## 2020-04-28 RX ORDER — BUTALBITAL, ACETAMINOPHEN AND CAFFEINE 50; 325; 40 MG/1; MG/1; MG/1
TABLET ORAL
Qty: 45 TABLET | Refills: 0 | Status: SHIPPED | OUTPATIENT
Start: 2020-04-28 | End: 2020-05-26

## 2020-05-11 ENCOUNTER — TELEMEDICINE (OUTPATIENT)
Dept: PRIMARY CARE CLINIC | Age: 70
End: 2020-05-11
Payer: MEDICARE

## 2020-05-11 PROCEDURE — G8428 CUR MEDS NOT DOCUMENT: HCPCS | Performed by: FAMILY MEDICINE

## 2020-05-11 PROCEDURE — 1123F ACP DISCUSS/DSCN MKR DOCD: CPT | Performed by: FAMILY MEDICINE

## 2020-05-11 PROCEDURE — 4040F PNEUMOC VAC/ADMIN/RCVD: CPT | Performed by: FAMILY MEDICINE

## 2020-05-11 PROCEDURE — 1090F PRES/ABSN URINE INCON ASSESS: CPT | Performed by: FAMILY MEDICINE

## 2020-05-11 PROCEDURE — 99213 OFFICE O/P EST LOW 20 MIN: CPT | Performed by: FAMILY MEDICINE

## 2020-05-11 PROCEDURE — 3017F COLORECTAL CA SCREEN DOC REV: CPT | Performed by: FAMILY MEDICINE

## 2020-05-11 PROCEDURE — G8399 PT W/DXA RESULTS DOCUMENT: HCPCS | Performed by: FAMILY MEDICINE

## 2020-05-11 ASSESSMENT — ENCOUNTER SYMPTOMS: BACK PAIN: 1

## 2020-05-26 RX ORDER — BUTALBITAL, ACETAMINOPHEN AND CAFFEINE 50; 325; 40 MG/1; MG/1; MG/1
TABLET ORAL
Qty: 45 TABLET | Refills: 0 | Status: SHIPPED | OUTPATIENT
Start: 2020-05-26 | End: 2020-06-23

## 2020-05-28 DIAGNOSIS — G43.019 INTRACTABLE MIGRAINE WITHOUT AURA AND WITHOUT STATUS MIGRAINOSUS: Primary | ICD-10-CM

## 2020-06-01 ENCOUNTER — OFFICE VISIT (OUTPATIENT)
Dept: PRIMARY CARE CLINIC | Age: 70
End: 2020-06-01
Payer: MEDICARE

## 2020-06-01 VITALS
TEMPERATURE: 99.1 F | OXYGEN SATURATION: 98 % | RESPIRATION RATE: 18 BRPM | DIASTOLIC BLOOD PRESSURE: 70 MMHG | WEIGHT: 140.6 LBS | HEART RATE: 95 BPM | HEIGHT: 65 IN | BODY MASS INDEX: 23.43 KG/M2 | SYSTOLIC BLOOD PRESSURE: 117 MMHG

## 2020-06-01 PROCEDURE — 3017F COLORECTAL CA SCREEN DOC REV: CPT | Performed by: NURSE PRACTITIONER

## 2020-06-01 PROCEDURE — 4040F PNEUMOC VAC/ADMIN/RCVD: CPT | Performed by: NURSE PRACTITIONER

## 2020-06-01 PROCEDURE — 1123F ACP DISCUSS/DSCN MKR DOCD: CPT | Performed by: NURSE PRACTITIONER

## 2020-06-01 PROCEDURE — G8420 CALC BMI NORM PARAMETERS: HCPCS | Performed by: NURSE PRACTITIONER

## 2020-06-01 PROCEDURE — G8399 PT W/DXA RESULTS DOCUMENT: HCPCS | Performed by: NURSE PRACTITIONER

## 2020-06-01 PROCEDURE — G8427 DOCREV CUR MEDS BY ELIG CLIN: HCPCS | Performed by: NURSE PRACTITIONER

## 2020-06-01 PROCEDURE — 1036F TOBACCO NON-USER: CPT | Performed by: NURSE PRACTITIONER

## 2020-06-01 PROCEDURE — 1090F PRES/ABSN URINE INCON ASSESS: CPT | Performed by: NURSE PRACTITIONER

## 2020-06-01 PROCEDURE — 99213 OFFICE O/P EST LOW 20 MIN: CPT | Performed by: NURSE PRACTITIONER

## 2020-06-01 RX ORDER — SUCRALFATE 1 G/1
1 TABLET ORAL
Qty: 40 TABLET | Refills: 3 | Status: SHIPPED | OUTPATIENT
Start: 2020-06-01 | End: 2021-06-01

## 2020-06-01 RX ORDER — ZOSTER VACCINE RECOMBINANT, ADJUVANTED 50 MCG/0.5
0.5 KIT INTRAMUSCULAR SEE ADMIN INSTRUCTIONS
Qty: 0.5 ML | Refills: 0 | Status: SHIPPED | OUTPATIENT
Start: 2020-06-01 | End: 2020-11-28

## 2020-06-01 NOTE — PROGRESS NOTES
Behavior: Behavior normal.         Thought Content: Thought content normal.         Judgment: Judgment normal.       /70   Pulse 95   Temp 99.1 °F (37.3 °C)   Resp 18   Ht 5' 5\" (1.651 m)   Wt 140 lb 9.6 oz (63.8 kg)   SpO2 98%   BMI 23.40 kg/m²     Assessment:       Diagnosis Orders   1. Acquired female bladder prolapse     2. Gastroesophageal reflux disease without esophagitis     3. Encounter for screening mammogram for malignant neoplasm of breast  HENRY DIGITAL SCREEN W CAD BILATERAL   4. Screening for cervical cancer  PAP SMEAR         Plan:   More than 50% of the time was spent counseling and coordinating care for a total time of 15 min face to face. Patient given educational materials -see patient instructions. Discussed use, benefit, and side effects of prescribed medications. All patient questions answered. Pt voiced understanding. Reviewed health maintenance. Instructed to continue currentmedications, diet and exercise. Patient agreed with treatment plan. Follow up as directed. MEDICATIONS:  Orders Placed This Encounter   Medications    zoster recombinant adjuvanted vaccine (SHINGRIX) 50 MCG/0.5ML SUSR injection     Sig: Inject 0.5 mLs into the muscle See Admin Instructions 1 dose now and repeat in 2-6 months     Dispense:  0.5 mL     Refill:  0    sucralfate (CARAFATE) 1 GM tablet     Sig: Take 1 tablet by mouth 4 times daily (before meals and nightly)     Dispense:  40 tablet     Refill:  3         ORDERS:  Orders Placed This Encounter   Procedures    HENRY DIGITAL SCREEN W CAD BILATERAL    PAP SMEAR       Follow-up:  Return if symptoms worsen or fail to improve. PATIENT INSTRUCTIONS:  Patient Instructions   continue your omeprazole daily  Add the Carafate 4 times a day to cover you for an ulcer.   If it is not better and 10 days we could send you to the hospital for the breathing test for H. pylori or to a GI doctor  Keep the area on your foot covered as long as it is open with Neosporin and a Band-Aid. If the soreness does not go away in 2 weeks we could send you to a foot doctor for an exploration of the area  Have your mammogram  Use a half applicator nightly of the Premarin vaginal cream for 14 days do the Kegel exercises after 14 days use a half applicator Monday Wednesday and Friday if you are still feeling pressure then I would send you back to Dr. Ever Hoffman again. Electronically signed by GUSTAVO Lee CNP on 6/5/2020 at 3:26 PM    EMR Dragon/transcription disclaimer:  Much of thisencounter note is electronic transcription/translation of spoken language to printed texts. The electronic translation of spoken language may be erroneous, or at times, nonsensical words or phrases may be inadvertentlytranscribed.   Although I have reviewed the note for such errors, some may still exist.

## 2020-06-01 NOTE — PATIENT INSTRUCTIONS
continue your omeprazole daily  Add the Carafate 4 times a day to cover you for an ulcer. If it is not better and 10 days we could send you to the hospital for the breathing test for H. pylori or to a GI doctor  Keep the area on your foot covered as long as it is open with Neosporin and a Band-Aid. If the soreness does not go away in 2 weeks we could send you to a foot doctor for an exploration of the area  Have your mammogram  Use a half applicator nightly of the Premarin vaginal cream for 14 days do the Kegel exercises after 14 days use a half applicator Monday Wednesday and Friday if you are still feeling pressure then I would send you back to Dr. Lakhwinder Singletary again.

## 2020-06-23 RX ORDER — ZOLPIDEM TARTRATE 5 MG/1
TABLET ORAL
Qty: 30 TABLET | Refills: 0 | Status: SHIPPED | OUTPATIENT
Start: 2020-06-23 | End: 2020-07-20

## 2020-06-23 RX ORDER — BUTALBITAL, ACETAMINOPHEN AND CAFFEINE 50; 325; 40 MG/1; MG/1; MG/1
TABLET ORAL
Qty: 45 TABLET | Refills: 0 | Status: SHIPPED | OUTPATIENT
Start: 2020-06-23 | End: 2020-07-22

## 2020-07-09 ENCOUNTER — OFFICE VISIT (OUTPATIENT)
Dept: NEUROLOGY | Facility: CLINIC | Age: 70
End: 2020-07-09

## 2020-07-09 VITALS
HEART RATE: 86 BPM | WEIGHT: 140 LBS | DIASTOLIC BLOOD PRESSURE: 80 MMHG | BODY MASS INDEX: 23.9 KG/M2 | HEIGHT: 64 IN | SYSTOLIC BLOOD PRESSURE: 112 MMHG | OXYGEN SATURATION: 97 %

## 2020-07-09 DIAGNOSIS — G43.719 INTRACTABLE CHRONIC MIGRAINE WITHOUT AURA AND WITHOUT STATUS MIGRAINOSUS: Primary | ICD-10-CM

## 2020-07-09 DIAGNOSIS — G44.40 MEDICATION OVERUSE HEADACHE: ICD-10-CM

## 2020-07-09 PROCEDURE — 99214 OFFICE O/P EST MOD 30 MIN: CPT | Performed by: PHYSICIAN ASSISTANT

## 2020-07-10 NOTE — PROGRESS NOTES
Neurology Progress Note      Chief Complaint:    Chronic migraine    Subjective     Subjective:  Patient returns to clinic today for follow-up of migraine headache disorder.  She is no longer doing Botox.  Patient is taking Emgality and had improvement with this and actually had weaned herself off of butalbital-containing products, however, she is now using this virtually daily to treat headache and is experiencing virtually a daily headache.  She has 3-4 more severe headaches per month that she characterizes as more the typical migrainous headaches.  She does not really experience any significant relief with abortive therapies including Compazine and Maxalt.  She continues to also use Ambien 5 mg, half tablet nightly for sleep.      Past Medical History:   Diagnosis Date   • Headache    • Migraine      Past Surgical History:   Procedure Laterality Date   • KNEE SURGERY       Family History   Problem Relation Age of Onset   • No Known Problems Mother    • No Known Problems Father      Social History     Tobacco Use   • Smoking status: Never Smoker   • Smokeless tobacco: Never Used   Substance Use Topics   • Alcohol use: No   • Drug use: No       Medications:  Current Outpatient Medications   Medication Sig Dispense Refill   • butalbital-acetaminophen-caffeine (FIORICET, ESGIC) -40 MG per tablet Take 1 tablet by mouth Every 4 (Four) Hours As Needed for Headache.     • Cholecalciferol (VITAMIN D PO) Take  by mouth Daily.     • galcanezumab-gnlm (Emgality) 120 MG/ML prefilled syringe Inject 1 mL under the skin into the appropriate area as directed Every 30 (Thirty) Days. 1 pen 5   • Lactobacillus (ACIDOPHILUS PO) Take  by mouth Daily.     • lisinopril (PRINIVIL,ZESTRIL) 20 MG tablet TAKE (1/2) TABLET BY MOUTH TWICE DAILY FOR BLOOD PRESSURE.     • LYSINE PO Take  by mouth Daily.     • MAGNESIUM PO Take  by mouth Daily.     • Multiple Vitamins-Minerals (MULTIVITAMIN WITH MINERALS) tablet tablet Take 1 tablet by  mouth Daily.     • omeprazole (priLOSEC) 40 MG capsule Take 40 mg by mouth As Needed.     • ondansetron (ZOFRAN) 4 MG tablet Take 4 mg by mouth Every 8 (Eight) Hours As Needed for Nausea or Vomiting.     • prochlorperazine (COMPAZINE) 5 MG tablet Take 1 tablet by mouth Every 8 (Eight) Hours As Needed for Nausea or Vomiting. 20 tablet 1   • Topiramate ER (Trokendi XR) 200 MG capsule sustained-release 24 hr Take 1 capsule by mouth every night at bedtime. 30 capsule 2   • valACYclovir (VALTREX) 500 MG tablet Take 1,000 mg by mouth 2 (Two) Times a Day As Needed.     • zolpidem (AMBIEN) 5 MG tablet Take 5 mg by mouth. Take one half tab at bedtime       No current facility-administered medications for this visit.        Allergies:    Atorvastatin    Review of Systems:   -A 14 point review of systems is completed and is negative.      Objective      Vital Signs  Heart Rate:  [86] 86  BP: (112)/(80) 112/80    Physical Exam:    General Exam:  Head:  Normocephalic, atraumatic.  HEENT: PERRLA.  Full EOM.  Neck:  No lymphadenopathy, thyromegaly or bruit.  Cardiac:  Regular rate and rhythm.  Normal S1, S2.  No murmur, rub or gallop.  Lungs:  Clear to auscultation bilaterally.  No wheeze, rales or rhonchi.  Abdomen:  Non-tender, Non-distended.  Bowel sounds normoactive.  Extremities: Full peripheral pulses.  No clubbing, cyanosis or edema.  Skin: No ulceration, breakdown or rash.      Neurologic Exam:  CERVICAL SPINE EXAMINATION:  RANGE OF MOTION: The patient is able to flex, extend, rotate, and side bend without pain or difficulty.  There is full range of motion.    PALPATION: Nontender to palpation midline and through upper cervical musculature.  STRENGTH: 5/5 bilateral trapezius, deltoid, triceps, biceps, wrist extensors/flexors, finger opposition.  SENSATION: Light touch and pinprick intact C5-T1 bilaterally.  REFLEXES: DTRs are 2+ bilaterally at biceps, triceps, and brachioradialis.  Soriano's and palmomental are negative  bilaterally.  SPECIAL TESTS:  Tinel's & Phalen's are negative. Spurling's is negative bilaterally.     Coordination:  -Finger to nose intact BUEs  -Heel to shin intact BLEs  -No ataxia     Gait  -No signs of ataxia  -ambulates unassisted       Results Review:        Assessment/Plan     Impression:  1.  Migraine headache disorder  2.  Medication overuse headache  3.  Difficulty sleeping      Plan:  1.  Continue Emgality 120 mg subcutaneous monthly.    2.  Samples provided of Ubrelvy 50 mg.  I would like her to take 100 mg daily for the next 2 days at the same time that she is discontinuing the butalbital-containing products.  Then she will take 50 mg daily for the next 6 days and then discontinue.  I would like this to be an avenue of getting her off of butalbital-containing products and I have expressed to her that she should not resume this, hereafter as it leads to the development of chronic migraine.    3.  Continue topiramate  mg daily    4.  Patient has comorbid hypertension.  I would recommend 140/90 or less.  However, it may be worth considering switching her to medication such as verapamil SR or propranolol as these have some prophylactic benefits in the setting of migraine.    5.  Patient has memory complaints with difficulty with short-term memory.  She feels that it is likely due to the Trokendi.  If we can get her overall headache burden decreased, tapering her off and discontinue topiramate may be advisable.    Patient will contact me in 2 weeks notify me of her ability getting off of the butalbital.    Thereafter, we will discuss what other prophylactic agents may be required or needed to help mitigate her overall headache symptoms and further abortive therapy.    Greater than 25 minutes of a 35-minute encounter spent in direct face-to-face contact with education and present counseling regarding aforementioned medications, side effects, prevention and treatment both chronic and acute migraine,  respectively.          Sebastien Valencia PA-C  07/10/20  07:38

## 2020-07-14 RX ORDER — ROSUVASTATIN CALCIUM 5 MG/1
TABLET, COATED ORAL
Qty: 90 TABLET | Refills: 0 | Status: SHIPPED | OUTPATIENT
Start: 2020-07-14 | End: 2020-10-14

## 2020-07-15 ENCOUNTER — TELEPHONE (OUTPATIENT)
Dept: NEUROLOGY | Facility: CLINIC | Age: 70
End: 2020-07-15

## 2020-07-15 NOTE — TELEPHONE ENCOUNTER
PT CALLED IN TO GIVE AN UPDATE OF HER UBRELVY 50MG , SHE STATES SHE IS LIKING THE MEDICATION BUT ONLY HAS ENOUGH  TO LAST UNTIL 7-17-20 SO SHE WAS WONDERING IF SHE CAN GET A PRESCRIPTION ?    ALSO SHE STATES SHE IS DOING WELL WITH SLOWLY DISCONTINUING THE butalbital-acetaminophen-caffeine (FIORICET, ESGIC) -40 MG per tablet

## 2020-07-16 NOTE — TELEPHONE ENCOUNTER
Her insurance will not cover this and the co-pay card will not work with her insurance as it is a Medicare based plan.  We can provide her some more samples.  Would like her to transition completely off the Fioricet and then use the Ubrelvy on an as-needed basis.

## 2020-07-17 RX ORDER — OMEPRAZOLE 40 MG/1
CAPSULE, DELAYED RELEASE ORAL
Qty: 90 CAPSULE | Refills: 0 | Status: SHIPPED | OUTPATIENT
Start: 2020-07-17 | End: 2020-10-05

## 2020-07-22 RX ORDER — VALACYCLOVIR HYDROCHLORIDE 1 G/1
TABLET, FILM COATED ORAL
Qty: 30 TABLET | Refills: 0 | Status: SHIPPED | OUTPATIENT
Start: 2020-07-22 | End: 2020-10-12

## 2020-07-22 RX ORDER — BUTALBITAL, ACETAMINOPHEN AND CAFFEINE 50; 325; 40 MG/1; MG/1; MG/1
TABLET ORAL
Qty: 45 TABLET | Refills: 0 | Status: SHIPPED | OUTPATIENT
Start: 2020-07-22 | End: 2020-08-26

## 2020-07-23 ENCOUNTER — NURSE ONLY (OUTPATIENT)
Dept: PRIMARY CARE CLINIC | Age: 70
End: 2020-07-23
Payer: MEDICARE

## 2020-07-23 LAB
ALBUMIN SERPL-MCNC: 4.4 G/DL (ref 3.5–5.2)
ALP BLD-CCNC: 87 U/L (ref 35–104)
ALT SERPL-CCNC: 32 U/L (ref 5–33)
ANION GAP SERPL CALCULATED.3IONS-SCNC: 11 MMOL/L (ref 7–19)
AST SERPL-CCNC: 24 U/L (ref 5–32)
BILIRUB SERPL-MCNC: 0.4 MG/DL (ref 0.2–1.2)
BUN BLDV-MCNC: 20 MG/DL (ref 8–23)
CALCIUM SERPL-MCNC: 9.5 MG/DL (ref 8.8–10.2)
CHLORIDE BLD-SCNC: 103 MMOL/L (ref 98–111)
CHOLESTEROL, TOTAL: 197 MG/DL (ref 160–199)
CO2: 25 MMOL/L (ref 22–29)
CREAT SERPL-MCNC: 0.7 MG/DL (ref 0.5–0.9)
GFR AFRICAN AMERICAN: >59
GFR NON-AFRICAN AMERICAN: >60
GLUCOSE BLD-MCNC: 107 MG/DL (ref 74–109)
HCT VFR BLD CALC: 48.5 % (ref 37–47)
HDLC SERPL-MCNC: 55 MG/DL (ref 65–121)
HEMOGLOBIN: 15.3 G/DL (ref 12–16)
LDL CHOLESTEROL CALCULATED: 112 MG/DL
MCH RBC QN AUTO: 30.1 PG (ref 27–31)
MCHC RBC AUTO-ENTMCNC: 31.5 G/DL (ref 33–37)
MCV RBC AUTO: 95.3 FL (ref 81–99)
PDW BLD-RTO: 12.3 % (ref 11.5–14.5)
PLATELET # BLD: 223 K/UL (ref 130–400)
PMV BLD AUTO: 10 FL (ref 9.4–12.3)
POTASSIUM SERPL-SCNC: 4.7 MMOL/L (ref 3.5–5)
RBC # BLD: 5.09 M/UL (ref 4.2–5.4)
SODIUM BLD-SCNC: 139 MMOL/L (ref 136–145)
TOTAL PROTEIN: 6.9 G/DL (ref 6.6–8.7)
TRIGL SERPL-MCNC: 148 MG/DL (ref 0–149)
VITAMIN D 25-HYDROXY: 47.6 NG/ML
WBC # BLD: 5.3 K/UL (ref 4.8–10.8)

## 2020-07-23 PROCEDURE — 36415 COLL VENOUS BLD VENIPUNCTURE: CPT | Performed by: FAMILY MEDICINE

## 2020-07-27 ENCOUNTER — OFFICE VISIT (OUTPATIENT)
Dept: PRIMARY CARE CLINIC | Age: 70
End: 2020-07-27
Payer: MEDICARE

## 2020-07-27 LAB
AMPHETAMINE SCREEN, URINE: NORMAL
BARBITURATE SCREEN, URINE: NORMAL
BENZODIAZEPINE SCREEN, URINE: NORMAL
BUPRENORPHINE URINE: NORMAL
COCAINE METABOLITE SCREEN URINE: NORMAL
GABAPENTIN SCREEN, URINE: NORMAL
MDMA URINE: NORMAL
METHADONE SCREEN, URINE: NORMAL
METHAMPHETAMINE, URINE: NORMAL
OPIATE SCREEN URINE: NORMAL
OXYCODONE SCREEN URINE: NORMAL
PHENCYCLIDINE SCREEN URINE: NORMAL
PROPOXYPHENE SCREEN, URINE: NORMAL
THC SCREEN, URINE: NORMAL
TRICYCLIC ANTIDEPRESSANTS, UR: NORMAL

## 2020-07-27 PROCEDURE — 1090F PRES/ABSN URINE INCON ASSESS: CPT | Performed by: FAMILY MEDICINE

## 2020-07-27 PROCEDURE — 80305 DRUG TEST PRSMV DIR OPT OBS: CPT | Performed by: FAMILY MEDICINE

## 2020-07-27 PROCEDURE — 3017F COLORECTAL CA SCREEN DOC REV: CPT | Performed by: FAMILY MEDICINE

## 2020-07-27 PROCEDURE — G8420 CALC BMI NORM PARAMETERS: HCPCS | Performed by: FAMILY MEDICINE

## 2020-07-27 PROCEDURE — 99214 OFFICE O/P EST MOD 30 MIN: CPT | Performed by: FAMILY MEDICINE

## 2020-07-27 PROCEDURE — G8427 DOCREV CUR MEDS BY ELIG CLIN: HCPCS | Performed by: FAMILY MEDICINE

## 2020-07-27 PROCEDURE — 1123F ACP DISCUSS/DSCN MKR DOCD: CPT | Performed by: FAMILY MEDICINE

## 2020-07-27 PROCEDURE — 4040F PNEUMOC VAC/ADMIN/RCVD: CPT | Performed by: FAMILY MEDICINE

## 2020-07-27 PROCEDURE — G8399 PT W/DXA RESULTS DOCUMENT: HCPCS | Performed by: FAMILY MEDICINE

## 2020-07-27 PROCEDURE — 1036F TOBACCO NON-USER: CPT | Performed by: FAMILY MEDICINE

## 2020-07-27 NOTE — PROGRESS NOTES
SUBJECTIVE:    Eyad S Aden is 77 y. o.female who comes in complaining of 6 Month Follow-Up (med managment)   . HPI: Eyad comes in today for a medication monitoring visit because she is on Esgic which is controlled because of the butalbital and Ambien. She has been on these medications for years. She is not abusing them. The Ambien does not make her feel hung over and she has had no amnestic episodes. She had labs done on 7/23/2020. Vitamin D was normal at 47.6. CBC revealed a normal white count, no anemia and normal platelets. CMP was normal.  LDL was 112 with an HDL of 55, triglycerides of 148 and total cholesterol of 197. She is also concerned about a lesion on her chest.  It seems to be getting larger. She is also concerned about a spot on her chin. She thinks it is poison ivy. It just is not healing well. She did see Dr. Ronan Lui for rectal and bladder surgery and has a follow-up appointment with him. She says her thumbs are becoming very stiff and sore and she thinks she may need to see Dr. Kumar Aranda again. She is also seeing [facility] neurology and they prescribe her Emgality.   Past Medical History:   Diagnosis Date    Chronic insomnia     Colon polyps     Depression     Headache(784.0)     Hypertension     Leukocytopenia     Mononucleosis     Osteoarthritis     Solar keratosis     Status post right partial knee replacement 09/08/2014        Vitamin D deficiency            Allergies   Allergen Reactions    Lipitor [Atorvastatin] Diarrhea and Nausea And Vomiting       Social History     Socioeconomic History    Marital status:      Spouse name: Jeremy Dawson Number of children: 3    Years of education: Not on file    Highest education level: Not on file   Occupational History    Occupation: Teacher   Social Needs    Financial resource strain: Not on file    Food insecurity     Worry: Not on file     Inability: Not on file   BackupAgent needs Appearance: Normal appearance. She is well-developed. HENT:      Head: Normocephalic. Right Ear: Tympanic membrane, ear canal and external ear normal.      Left Ear: Tympanic membrane, ear canal and external ear normal.   Eyes:      Extraocular Movements: Extraocular movements intact. Conjunctiva/sclera: Conjunctivae normal.      Pupils: Pupils are equal, round, and reactive to light. Neck:      Musculoskeletal: Normal range of motion and neck supple. Vascular: No carotid bruit. Cardiovascular:      Rate and Rhythm: Normal rate and regular rhythm. Heart sounds: Normal heart sounds. No murmur. Pulmonary:      Effort: Pulmonary effort is normal. No respiratory distress. Breath sounds: Normal breath sounds. Musculoskeletal: Normal range of motion. Lymphadenopathy:      Cervical: No cervical adenopathy. Skin:     General: Skin is warm and dry. Neurological:      General: No focal deficit present. Mental Status: She is alert and oriented to person, place, and time. Coordination: Coordination normal.      Deep Tendon Reflexes: Reflexes normal.   Psychiatric:         Mood and Affect: Mood normal.         Speech: Speech normal.         Behavior: Behavior normal.         Thought Content: Thought content normal.         Judgment: Judgment normal.         ASSESSMENT:    1. Chronic insomnia    2. Skin lesion of chest wall    3. Migraine with aura and without status migrainosus, not intractable    4. Essential hypertension    5. Poison ivy    6.  Medication management          PLAN:    MEDICATIONS:  Orders Placed This Encounter   Medications    triamcinolone (KENALOG) 0.1 % ointment     Sig: Apply topically 2 times daily for 7 days To lesion on chin     Dispense:  15 g     Refill:  0       ORDERS:  Orders Placed This Encounter   Procedures    External Referral To Dermatology    POCT Rapid Drug Screen     I am going to refer her to Dr. Stella Vargas for the lesion on her chest.  I am

## 2020-08-01 ASSESSMENT — ENCOUNTER SYMPTOMS
SHORTNESS OF BREATH: 0
CHEST TIGHTNESS: 0
ABDOMINAL PAIN: 0
COUGH: 0

## 2020-08-26 RX ORDER — BUTALBITAL, ACETAMINOPHEN AND CAFFEINE 50; 325; 40 MG/1; MG/1; MG/1
TABLET ORAL
Qty: 45 TABLET | Refills: 0 | Status: SHIPPED | OUTPATIENT
Start: 2020-08-26 | End: 2020-10-05

## 2020-08-28 DIAGNOSIS — G43.019 INTRACTABLE MIGRAINE WITHOUT AURA AND WITHOUT STATUS MIGRAINOSUS: ICD-10-CM

## 2020-10-05 RX ORDER — OMEPRAZOLE 40 MG/1
CAPSULE, DELAYED RELEASE ORAL
Qty: 90 CAPSULE | Refills: 0 | Status: SHIPPED | OUTPATIENT
Start: 2020-10-05 | End: 2020-12-28

## 2020-10-05 RX ORDER — BUTALBITAL, ACETAMINOPHEN AND CAFFEINE 50; 325; 40 MG/1; MG/1; MG/1
TABLET ORAL
Qty: 45 TABLET | Refills: 0 | Status: SHIPPED | OUTPATIENT
Start: 2020-10-05 | End: 2020-11-03

## 2020-10-12 RX ORDER — VALACYCLOVIR HYDROCHLORIDE 1 G/1
TABLET, FILM COATED ORAL
Qty: 30 TABLET | Refills: 1 | Status: SHIPPED | OUTPATIENT
Start: 2020-10-12 | End: 2021-12-22

## 2020-10-19 RX ORDER — ZOLPIDEM TARTRATE 5 MG/1
TABLET ORAL
Qty: 30 TABLET | Refills: 0 | Status: SHIPPED | OUTPATIENT
Start: 2020-10-19 | End: 2020-11-16

## 2020-10-28 ENCOUNTER — TELEPHONE (OUTPATIENT)
Dept: PRIMARY CARE CLINIC | Age: 70
End: 2020-10-28

## 2020-10-28 RX ORDER — PETROLATUM, MENTHOL, UNSPECIFIED FORM, CAMPHOR (SYNTHETIC), AND PHENOL 59.14; 1; 1; .6 G/100G; G/100G; G/100G; G/100G
PASTE TOPICAL
Qty: 1 TUBE | Refills: 0 | Status: SHIPPED | OUTPATIENT
Start: 2020-10-28

## 2020-10-28 RX ORDER — NYSTATIN 100000 U/G
CREAM TOPICAL
Qty: 30 G | Refills: 0 | Status: SHIPPED | OUTPATIENT
Start: 2020-10-28 | End: 2022-01-10

## 2020-10-30 DIAGNOSIS — G43.719 INTRACTABLE CHRONIC MIGRAINE WITHOUT AURA AND WITHOUT STATUS MIGRAINOSUS: Primary | ICD-10-CM

## 2020-10-30 RX ORDER — GALCANEZUMAB 120 MG/ML
120 INJECTION, SOLUTION SUBCUTANEOUS
Qty: 1 PEN | Refills: 5 | Status: SHIPPED | OUTPATIENT
Start: 2020-10-30 | End: 2021-04-20 | Stop reason: SDUPTHER

## 2020-11-03 RX ORDER — BUTALBITAL, ACETAMINOPHEN AND CAFFEINE 50; 325; 40 MG/1; MG/1; MG/1
TABLET ORAL
Qty: 45 TABLET | Refills: 0 | Status: SHIPPED | OUTPATIENT
Start: 2020-11-03 | End: 2020-11-30

## 2020-11-04 ENCOUNTER — OFFICE VISIT (OUTPATIENT)
Dept: NEUROLOGY | Facility: CLINIC | Age: 70
End: 2020-11-04

## 2020-11-04 VITALS
HEART RATE: 86 BPM | BODY MASS INDEX: 24.34 KG/M2 | DIASTOLIC BLOOD PRESSURE: 74 MMHG | HEIGHT: 64 IN | WEIGHT: 142.6 LBS | OXYGEN SATURATION: 97 % | SYSTOLIC BLOOD PRESSURE: 120 MMHG

## 2020-11-04 DIAGNOSIS — M54.81 BILATERAL OCCIPITAL NEURALGIA: Primary | ICD-10-CM

## 2020-11-04 DIAGNOSIS — M50.30 DDD (DEGENERATIVE DISC DISEASE), CERVICAL: ICD-10-CM

## 2020-11-04 DIAGNOSIS — M47.812 ARTHROPATHY OF CERVICAL FACET JOINT: ICD-10-CM

## 2020-11-04 DIAGNOSIS — G43.719 INTRACTABLE CHRONIC MIGRAINE WITHOUT AURA AND WITHOUT STATUS MIGRAINOSUS: ICD-10-CM

## 2020-11-04 DIAGNOSIS — G44.40 MEDICATION OVERUSE HEADACHE: ICD-10-CM

## 2020-11-04 DIAGNOSIS — M79.18 CERVICAL MYOFASCIAL PAIN SYNDROME: ICD-10-CM

## 2020-11-04 PROCEDURE — 64405 NJX AA&/STRD GR OCPL NRV: CPT | Performed by: PHYSICIAN ASSISTANT

## 2020-11-04 PROCEDURE — 99214 OFFICE O/P EST MOD 30 MIN: CPT | Performed by: PHYSICIAN ASSISTANT

## 2020-11-04 RX ORDER — TRIAMCINOLONE ACETONIDE 40 MG/ML
40 INJECTION, SUSPENSION INTRA-ARTICULAR; INTRAMUSCULAR ONCE
Status: COMPLETED | OUTPATIENT
Start: 2020-11-04 | End: 2020-11-04

## 2020-11-04 RX ORDER — BUPIVACAINE HYDROCHLORIDE 5 MG/ML
5 INJECTION, SOLUTION PERINEURAL ONCE
Status: COMPLETED | OUTPATIENT
Start: 2020-11-04 | End: 2020-11-04

## 2020-11-04 RX ORDER — ROSUVASTATIN CALCIUM 5 MG/1
1 TABLET, COATED ORAL DAILY
COMMUNITY
Start: 2020-10-14 | End: 2022-02-23

## 2020-11-04 RX ADMIN — TRIAMCINOLONE ACETONIDE 40 MG: 40 INJECTION, SUSPENSION INTRA-ARTICULAR; INTRAMUSCULAR at 15:05

## 2020-11-04 RX ADMIN — BUPIVACAINE HYDROCHLORIDE 5 ML: 5 INJECTION, SOLUTION PERINEURAL at 15:04

## 2020-11-04 NOTE — PROGRESS NOTES
Neurology Progress Note      Chief Complaint:    Chronic headache disorder    Subjective     Subjective:  Patient returns to clinic today for follow-up evaluation of chronic headache which is multifactorial including migraine as well as tension type headache.  Patient also has chronic neck pain and localizes her headache today at the bilateral occiput.  She rates her pain initially as an 8 and states that it is somewhere between a 6 and 8.  She is never headache-free.  She had done well initially with the last plan of using Ubrelvy consecutively for several days to come off of Fioricet, however, she has gone back to using the Fioricet on a twice daily basis.  She occasionally has some nausea, however, the majority of her headaches on a daily basis/rather than chronic portion of her headache, is occipital as stated today.  Occasionally she does have emesis and other migrainous component with light and sound sensitivity as well.      Past Medical History:   Diagnosis Date   • Headache    • Migraine      Past Surgical History:   Procedure Laterality Date   • KNEE SURGERY       Family History   Problem Relation Age of Onset   • No Known Problems Mother    • No Known Problems Father      Social History     Tobacco Use   • Smoking status: Never Smoker   • Smokeless tobacco: Never Used   Substance Use Topics   • Alcohol use: No   • Drug use: No       Medications:  Current Outpatient Medications   Medication Sig Dispense Refill   • butalbital-acetaminophen-caffeine (FIORICET, ESGIC) -40 MG per tablet Take 1 tablet by mouth Every 4 (Four) Hours As Needed for Headache.     • Cholecalciferol (VITAMIN D PO) Take  by mouth Daily.     • galcanezumab-gnlm (Emgality) 120 MG/ML prefilled syringe Inject 1 mL under the skin into the appropriate area as directed Every 30 (Thirty) Days. 1 pen 5   • lisinopril (PRINIVIL,ZESTRIL) 20 MG tablet TAKE (1/2) TABLET BY MOUTH TWICE DAILY FOR BLOOD PRESSURE.     • LYSINE PO Take  by mouth  Daily.     • MAGNESIUM PO Take  by mouth Daily.     • Multiple Vitamins-Minerals (MULTIVITAMIN WITH MINERALS) tablet tablet Take 1 tablet by mouth Daily.     • omeprazole (priLOSEC) 40 MG capsule Take 40 mg by mouth As Needed.     • ondansetron (ZOFRAN) 4 MG tablet Take 4 mg by mouth Every 8 (Eight) Hours As Needed for Nausea or Vomiting.     • rosuvastatin (CRESTOR) 5 MG tablet Take 1 tablet by mouth Daily.     • Topiramate ER (Trokendi XR) 200 MG capsule sustained-release 24 hr Take 1 capsule by mouth every night at bedtime. 30 capsule 11   • valACYclovir (VALTREX) 500 MG tablet Take 1,000 mg by mouth 2 (Two) Times a Day As Needed.     • zolpidem (AMBIEN) 5 MG tablet Take 5 mg by mouth. Take one half tab at bedtime       No current facility-administered medications for this visit.        Allergies:    Atorvastatin    Review of Systems:   -A 14 point review of systems is completed and is negative.      Objective      Vital Signs  Heart Rate:  [86] 86  BP: (120)/(74) 120/74    Physical Exam:    General Exam:  Head:  Normocephalic, atraumatic.  HEENT: PERRLA.  Full EOM.  Neck:  No lymphadenopathy, thyromegaly or bruit.  Cardiac:  Regular rate and rhythm.  Normal S1, S2.  No murmur, rub or gallop.  Lungs:  Clear to auscultation bilaterally.  No wheeze, rales or rhonchi.  Abdomen:  Non-tender, Non-distended.  Bowel sounds normoactive.  Extremities: Full peripheral pulses.  No clubbing, cyanosis or edema.  Skin: No ulceration, breakdown or rash.      Neurologic Exam:  CERVICAL SPINE EXAMINATION:  RANGE OF MOTION: Patient has limited range of motion all planes but especially and sidebending and lateral rotation to approximately 15 degrees and 35 degrees bilaterally.  PALPATION: Nontender to palpation midline and through upper cervical musculature.  STRENGTH: 5/5 bilateral trapezius, deltoid, triceps, biceps, wrist extensors/flexors, finger opposition.  SENSATION: Light touch and pinprick intact C5-T1  bilaterally.  REFLEXES: DTRs are 2+ bilaterally at biceps, triceps, and brachioradialis.  Soriano's and palmomental are negative bilaterally.       Coordination:  -Finger to nose intact BUEs  -Heel to shin intact BLEs  -No ataxia     Gait  -No signs of ataxia  -ambulates unassisted        PROCEDURE NOTE:    Occipital Nerve Block, Bilateral    After informed consent was obtained, anatomical landmarks were palpated in the insertion of the lesser and greater occipital nerve were located.  The skin was prepped with isopropyl alcohol and the area infiltrated with a 25-gauge 5/8 inch needle using a combination of 2.5 mL 0.25% marcaine and 0.5 mL Kenalog (40 mg/mL).    The procedure was duplicated on the contralateral side.    Hemostasis was assured.  There were no complications to the procedure.  Patient was observed 15 minutes following the procedure.    Subjective Pain Assessment (patient reported):  Pre-procedure pain:  7/10  Post-procedure pain:  0/10      Assessment/Plan     Impression:  1.  Chronic headache disorder  2.  Bilateral occipital neuralgia  3.  Cervical myofascial pain  4.  DDD cervical  5.  Cervical facet arthropathy  6.  Chronic migraine  7.  Medication overuse headache      Plan:  1.  Continue present regimen of medication.    2.  Continue to try to limit use of butalbital-containing product    3.  Follow-up in 4 weeks for repeat occipital nerve block.  At that time, I would consider the possibility of outpatient physical therapy for very gentle traction and cervical range of motion.    4.  At follow-up, based upon her response, consider plain film imaging of the cervical spine to assess for cervical facet arthropathy.  She has considerably limited range of motion as detailed above.    5.  I still believe that she does have a migrainous component and can consider further titration of the Emgality.  She did have symptom relief with the Ubrelvy which also helps to confirm a migraine component, however,  insurance would not cover this medication.    6.  Patient was provided post-procedure care instructions.  She had total resolution in her headache with the anesthetic portion of the procedure.  Believe this helps confirm the fact that the patient does have considerable occipital neuralgia.  She may benefit from other neuropathic pain have modulating agents such as Depakene or gabapentin.    Patient voices understanding and agreement with plan of care will call for any concerns or questions in the interim.  Greater than 25 minutes of the 30-minute encounter spent direct face-to-face education & counseling regarding aforementioned measures, procedures, medications, side effects, follow-up plan of care.          Sebastien Valencia PA-C  11/04/20  14:22 CST

## 2020-12-16 ENCOUNTER — OFFICE VISIT (OUTPATIENT)
Dept: NEUROLOGY | Facility: CLINIC | Age: 70
End: 2020-12-16

## 2020-12-16 VITALS
BODY MASS INDEX: 24.24 KG/M2 | WEIGHT: 142 LBS | SYSTOLIC BLOOD PRESSURE: 104 MMHG | HEIGHT: 64 IN | DIASTOLIC BLOOD PRESSURE: 70 MMHG | HEART RATE: 96 BPM | OXYGEN SATURATION: 99 %

## 2020-12-16 DIAGNOSIS — M50.30 DDD (DEGENERATIVE DISC DISEASE), CERVICAL: ICD-10-CM

## 2020-12-16 DIAGNOSIS — M47.812 ARTHROPATHY OF CERVICAL FACET JOINT: ICD-10-CM

## 2020-12-16 DIAGNOSIS — M79.18 CERVICAL MYOFASCIAL PAIN SYNDROME: ICD-10-CM

## 2020-12-16 DIAGNOSIS — M54.81 BILATERAL OCCIPITAL NEURALGIA: Primary | ICD-10-CM

## 2020-12-16 PROCEDURE — 99213 OFFICE O/P EST LOW 20 MIN: CPT | Performed by: PHYSICIAN ASSISTANT

## 2020-12-16 PROCEDURE — 64405 NJX AA&/STRD GR OCPL NRV: CPT | Performed by: PHYSICIAN ASSISTANT

## 2020-12-16 RX ORDER — BUPIVACAINE HYDROCHLORIDE 5 MG/ML
5 INJECTION, SOLUTION PERINEURAL ONCE
Status: COMPLETED | OUTPATIENT
Start: 2020-12-16 | End: 2020-12-16

## 2020-12-16 RX ORDER — TRIAMCINOLONE ACETONIDE 40 MG/ML
40 INJECTION, SUSPENSION INTRA-ARTICULAR; INTRAMUSCULAR ONCE
Status: COMPLETED | OUTPATIENT
Start: 2020-12-16 | End: 2020-12-16

## 2020-12-16 RX ADMIN — TRIAMCINOLONE ACETONIDE 40 MG: 40 INJECTION, SUSPENSION INTRA-ARTICULAR; INTRAMUSCULAR at 09:03

## 2020-12-16 RX ADMIN — BUPIVACAINE HYDROCHLORIDE 5 ML: 5 INJECTION, SOLUTION PERINEURAL at 09:02

## 2020-12-16 NOTE — PROGRESS NOTES
Neurology Progress Note      Chief Complaint:    Bilateral occipital neuralgia  DDD cervical  Cervical facet arthropathy    Subjective     Subjective:  Patient presents today for repeat bilateral occipital nerve block with corticosteroid.  The patient had a significant decrease in her overall cervical myofascial pain and neck pain following the previous occipital nerve block, however, continues with significant occipital headache burden.  The risks, benefits & alternatives to the procedure have been discussed with the patient and she agrees to proceed.  These include but are not limited to: Incomplete resolution of symptoms, worsening of symptoms, nerve injury, vascular injury/hematoma, infection, steroid-induced fat necrosis, etc.      Past Medical History:   Diagnosis Date   • Headache    • Migraine      Past Surgical History:   Procedure Laterality Date   • KNEE SURGERY       Family History   Problem Relation Age of Onset   • No Known Problems Mother    • No Known Problems Father      Social History     Tobacco Use   • Smoking status: Never Smoker   • Smokeless tobacco: Never Used   Substance Use Topics   • Alcohol use: No   • Drug use: No       Medications:  Current Outpatient Medications   Medication Sig Dispense Refill   • butalbital-acetaminophen-caffeine (FIORICET, ESGIC) -40 MG per tablet Take 1 tablet by mouth Every 4 (Four) Hours As Needed for Headache.     • Cholecalciferol (VITAMIN D PO) Take  by mouth Daily.     • galcanezumab-gnlm (Emgality) 120 MG/ML prefilled syringe Inject 1 mL under the skin into the appropriate area as directed Every 30 (Thirty) Days. 1 pen 5   • lisinopril (PRINIVIL,ZESTRIL) 20 MG tablet TAKE (1/2) TABLET BY MOUTH TWICE DAILY FOR BLOOD PRESSURE.     • LYSINE PO Take  by mouth Daily.     • MAGNESIUM PO Take  by mouth Daily.     • Multiple Vitamins-Minerals (MULTIVITAMIN WITH MINERALS) tablet tablet Take 1 tablet by mouth Daily.     • omeprazole (priLOSEC) 40 MG capsule  Take 40 mg by mouth As Needed.     • ondansetron (ZOFRAN) 4 MG tablet Take 4 mg by mouth Every 8 (Eight) Hours As Needed for Nausea or Vomiting.     • rosuvastatin (CRESTOR) 5 MG tablet Take 1 tablet by mouth Daily.     • Topiramate ER (Trokendi XR) 200 MG capsule sustained-release 24 hr Take 1 capsule by mouth every night at bedtime. 30 capsule 11   • valACYclovir (VALTREX) 500 MG tablet Take 1,000 mg by mouth 2 (Two) Times a Day As Needed.     • zolpidem (AMBIEN) 5 MG tablet Take 5 mg by mouth. Take one half tab at bedtime       No current facility-administered medications for this visit.        Allergies:    Atorvastatin    Review of Systems:   -A 14 point review of systems is completed and is negative.      Objective      Vital Signs  Heart Rate:  [96] 96  BP: (104)/(70) 104/70    Physical Exam:    General Exam:  Head:  Normocephalic, atraumatic.  HEENT: PERRLA.  Full EOM.  Neck:  No lymphadenopathy, thyromegaly or bruit.  Cardiac:  Regular rate and rhythm.  Normal S1, S2.  No murmur, rub or gallop.  Lungs:  Clear to auscultation bilaterally.  No wheeze, rales or rhonchi.  Abdomen:  Non-tender, Non-distended.  Bowel sounds normoactive.  Extremities: Full peripheral pulses.  No clubbing, cyanosis or edema.  Skin: No ulceration, breakdown or rash.      Neurologic Exam:  CERVICAL SPINE EXAMINATION:  RANGE OF MOTION: The patient is able to flex, extend, rotate, and side bend without pain or difficulty.  There is full range of motion.    PALPATION: Nontender to palpation midline and through upper cervical musculature.  STRENGTH: 5/5 bilateral trapezius, deltoid, triceps, biceps, wrist extensors/flexors, finger opposition.  SENSATION: Light touch and pinprick intact C5-T1 bilaterally.  REFLEXES: DTRs are 2+ bilaterally at biceps, triceps, and brachioradialis.  Soriano's and palmomental are negative bilaterally.  SPECIAL TESTS:  Tinel's & Phalen's are negative. Spurling's is negative bilaterally.      Coordination:  -Finger to nose intact BUEs  -Heel to shin intact BLEs  -No ataxia     Gait  -No signs of ataxia  -ambulates unassisted       Results Review:        Assessment/Plan     Impression:  1.  Bilateral occipital neuralgia  2.  Cervical myofascial pain  3.  Chronic neck pain  4.  DDD cervical  5.  Cervical facet arthropathy      Plan:  1.  Patient underwent above procedure tolerated this without any difficulty with total relief of headache with the anesthetic phase of the procedure.    2.  We will now start outpatient physical therapy with cervical traction, range of motion home exercise program, myofascial release techniques, heat, e-stim, massage, etc.    3.  Follow-up with me in 2 months at which time we may consider a third in the series of occipital nerve blocks if needed/indicated at that time.    4.  Medication regimen remain the same in the interim.    5.  Patient will call for any concerns or questions in the interim.          Sebastien Valencia PA-C  12/16/20  09:04 CST

## 2020-12-28 RX ORDER — OMEPRAZOLE 40 MG/1
CAPSULE, DELAYED RELEASE ORAL
Qty: 90 CAPSULE | Refills: 0 | Status: SHIPPED | OUTPATIENT
Start: 2020-12-28 | End: 2021-04-06

## 2020-12-30 RX ORDER — BUTALBITAL, ACETAMINOPHEN AND CAFFEINE 50; 325; 40 MG/1; MG/1; MG/1
TABLET ORAL
Qty: 45 TABLET | Refills: 0 | Status: SHIPPED | OUTPATIENT
Start: 2020-12-30 | End: 2021-01-27

## 2021-01-14 ENCOUNTER — IMMUNIZATION (OUTPATIENT)
Dept: VACCINE CLINIC | Facility: HOSPITAL | Age: 71
End: 2021-01-14

## 2021-01-14 PROCEDURE — 91300 HC SARSCOV02 VAC 30MCG/0.3ML IM: CPT | Performed by: NURSE PRACTITIONER

## 2021-01-14 PROCEDURE — 0001A: CPT | Performed by: NURSE PRACTITIONER

## 2021-01-26 DIAGNOSIS — G43.711 INTRACTABLE CHRONIC MIGRAINE WITHOUT AURA AND WITH STATUS MIGRAINOSUS: ICD-10-CM

## 2021-01-27 RX ORDER — BUTALBITAL, ACETAMINOPHEN AND CAFFEINE 50; 325; 40 MG/1; MG/1; MG/1
TABLET ORAL
Qty: 45 TABLET | Refills: 0 | Status: SHIPPED | OUTPATIENT
Start: 2021-01-27 | End: 2021-02-26

## 2021-02-01 ENCOUNTER — TELEPHONE (OUTPATIENT)
Dept: ADMINISTRATIVE | Age: 71
End: 2021-02-01

## 2021-02-01 NOTE — TELEPHONE ENCOUNTER
PT req missed appt due to being on the road be rescheduled, AWV was missed to being on road, could not accommodate to reschedule, please call for new appt.

## 2021-02-04 ENCOUNTER — IMMUNIZATION (OUTPATIENT)
Dept: VACCINE CLINIC | Facility: HOSPITAL | Age: 71
End: 2021-02-04

## 2021-02-04 PROCEDURE — 0002A: CPT | Performed by: THORACIC SURGERY (CARDIOTHORACIC VASCULAR SURGERY)

## 2021-02-04 PROCEDURE — 91300 HC SARSCOV02 VAC 30MCG/0.3ML IM: CPT | Performed by: THORACIC SURGERY (CARDIOTHORACIC VASCULAR SURGERY)

## 2021-02-18 ENCOUNTER — TELEPHONE (OUTPATIENT)
Dept: ADMINISTRATIVE | Age: 71
End: 2021-02-18

## 2021-02-18 NOTE — TELEPHONE ENCOUNTER
Pt called to req appt she missed while out of town for [facility], she req VV as well, I mentioned blood work in notes and she said that wasn't the case, please advise if she does need blood work, I could not accommodate the schedule to reschedule her please call for appt.

## 2021-02-23 ENCOUNTER — NURSE ONLY (OUTPATIENT)
Dept: PRIMARY CARE CLINIC | Age: 71
End: 2021-02-23
Payer: MEDICARE

## 2021-02-23 DIAGNOSIS — D72.819 LEUKOPENIA, UNSPECIFIED TYPE: ICD-10-CM

## 2021-02-23 DIAGNOSIS — E55.9 VITAMIN D DEFICIENCY: ICD-10-CM

## 2021-02-23 DIAGNOSIS — I10 ESSENTIAL HYPERTENSION: Primary | ICD-10-CM

## 2021-02-23 DIAGNOSIS — E78.2 MIXED HYPERLIPIDEMIA: ICD-10-CM

## 2021-02-23 LAB
ALBUMIN SERPL-MCNC: 4.4 G/DL (ref 3.5–5.2)
ALP BLD-CCNC: 91 U/L (ref 35–104)
ALT SERPL-CCNC: 29 U/L (ref 5–33)
ANION GAP SERPL CALCULATED.3IONS-SCNC: 13 MMOL/L (ref 7–19)
AST SERPL-CCNC: 19 U/L (ref 5–32)
BILIRUB SERPL-MCNC: 0.3 MG/DL (ref 0.2–1.2)
BUN BLDV-MCNC: 17 MG/DL (ref 8–23)
CALCIUM SERPL-MCNC: 9.1 MG/DL (ref 8.8–10.2)
CHLORIDE BLD-SCNC: 106 MMOL/L (ref 98–111)
CHOLESTEROL, TOTAL: 194 MG/DL (ref 160–199)
CO2: 22 MMOL/L (ref 22–29)
CREAT SERPL-MCNC: 0.7 MG/DL (ref 0.5–0.9)
GFR AFRICAN AMERICAN: >59
GFR NON-AFRICAN AMERICAN: >60
GLUCOSE BLD-MCNC: 99 MG/DL (ref 74–109)
HCT VFR BLD CALC: 45.8 % (ref 37–47)
HDLC SERPL-MCNC: 58 MG/DL (ref 65–121)
HEMOGLOBIN: 14.1 G/DL (ref 12–16)
LDL CHOLESTEROL CALCULATED: 94 MG/DL
MCH RBC QN AUTO: 30.1 PG (ref 27–31)
MCHC RBC AUTO-ENTMCNC: 30.8 G/DL (ref 33–37)
MCV RBC AUTO: 97.9 FL (ref 81–99)
PDW BLD-RTO: 12.3 % (ref 11.5–14.5)
PLATELET # BLD: 270 K/UL (ref 130–400)
PMV BLD AUTO: 9.8 FL (ref 9.4–12.3)
POTASSIUM SERPL-SCNC: 4 MMOL/L (ref 3.5–5)
RBC # BLD: 4.68 M/UL (ref 4.2–5.4)
SODIUM BLD-SCNC: 141 MMOL/L (ref 136–145)
TOTAL PROTEIN: 6.7 G/DL (ref 6.6–8.7)
TRIGL SERPL-MCNC: 209 MG/DL (ref 0–149)
VITAMIN D 25-HYDROXY: 47.1 NG/ML
WBC # BLD: 5 K/UL (ref 4.8–10.8)

## 2021-02-23 PROCEDURE — 36415 COLL VENOUS BLD VENIPUNCTURE: CPT | Performed by: FAMILY MEDICINE

## 2021-02-26 DIAGNOSIS — G43.711 INTRACTABLE CHRONIC MIGRAINE WITHOUT AURA AND WITH STATUS MIGRAINOSUS: ICD-10-CM

## 2021-02-26 RX ORDER — BUTALBITAL, ACETAMINOPHEN AND CAFFEINE 50; 325; 40 MG/1; MG/1; MG/1
TABLET ORAL
Qty: 45 TABLET | Refills: 0 | Status: SHIPPED | OUTPATIENT
Start: 2021-02-26 | End: 2021-03-23 | Stop reason: SDUPTHER

## 2021-03-23 DIAGNOSIS — G43.711 INTRACTABLE CHRONIC MIGRAINE WITHOUT AURA AND WITH STATUS MIGRAINOSUS: ICD-10-CM

## 2021-03-23 RX ORDER — BUTALBITAL, ACETAMINOPHEN AND CAFFEINE 50; 325; 40 MG/1; MG/1; MG/1
1 TABLET ORAL EVERY 6 HOURS PRN
Qty: 45 TABLET | Refills: 0 | Status: SHIPPED | OUTPATIENT
Start: 2021-03-23 | End: 2021-04-26

## 2021-04-07 ENCOUNTER — OFFICE VISIT (OUTPATIENT)
Dept: PRIMARY CARE CLINIC | Age: 71
End: 2021-04-07
Payer: MEDICARE

## 2021-04-07 VITALS
RESPIRATION RATE: 18 BRPM | SYSTOLIC BLOOD PRESSURE: 112 MMHG | TEMPERATURE: 97 F | WEIGHT: 143.8 LBS | DIASTOLIC BLOOD PRESSURE: 70 MMHG | OXYGEN SATURATION: 97 % | HEIGHT: 64 IN | BODY MASS INDEX: 24.55 KG/M2 | HEART RATE: 89 BPM

## 2021-04-07 DIAGNOSIS — F51.04 CHRONIC INSOMNIA: ICD-10-CM

## 2021-04-07 DIAGNOSIS — Z12.31 ENCOUNTER FOR SCREENING MAMMOGRAM FOR BREAST CANCER: ICD-10-CM

## 2021-04-07 DIAGNOSIS — Z00.00 ROUTINE GENERAL MEDICAL EXAMINATION AT A HEALTH CARE FACILITY: Primary | ICD-10-CM

## 2021-04-07 DIAGNOSIS — G43.711 INTRACTABLE CHRONIC MIGRAINE WITHOUT AURA AND WITH STATUS MIGRAINOSUS: ICD-10-CM

## 2021-04-07 DIAGNOSIS — E78.2 MIXED HYPERLIPIDEMIA: ICD-10-CM

## 2021-04-07 DIAGNOSIS — E55.9 VITAMIN D DEFICIENCY: ICD-10-CM

## 2021-04-07 DIAGNOSIS — I10 ESSENTIAL HYPERTENSION: ICD-10-CM

## 2021-04-07 PROCEDURE — G8399 PT W/DXA RESULTS DOCUMENT: HCPCS | Performed by: FAMILY MEDICINE

## 2021-04-07 PROCEDURE — G8420 CALC BMI NORM PARAMETERS: HCPCS | Performed by: FAMILY MEDICINE

## 2021-04-07 PROCEDURE — 4040F PNEUMOC VAC/ADMIN/RCVD: CPT | Performed by: FAMILY MEDICINE

## 2021-04-07 PROCEDURE — 99214 OFFICE O/P EST MOD 30 MIN: CPT | Performed by: FAMILY MEDICINE

## 2021-04-07 PROCEDURE — 1123F ACP DISCUSS/DSCN MKR DOCD: CPT | Performed by: FAMILY MEDICINE

## 2021-04-07 PROCEDURE — 1090F PRES/ABSN URINE INCON ASSESS: CPT | Performed by: FAMILY MEDICINE

## 2021-04-07 PROCEDURE — 1036F TOBACCO NON-USER: CPT | Performed by: FAMILY MEDICINE

## 2021-04-07 PROCEDURE — 3017F COLORECTAL CA SCREEN DOC REV: CPT | Performed by: FAMILY MEDICINE

## 2021-04-07 PROCEDURE — G8427 DOCREV CUR MEDS BY ELIG CLIN: HCPCS | Performed by: FAMILY MEDICINE

## 2021-04-07 PROCEDURE — G0439 PPPS, SUBSEQ VISIT: HCPCS | Performed by: FAMILY MEDICINE

## 2021-04-07 ASSESSMENT — PATIENT HEALTH QUESTIONNAIRE - PHQ9
SUM OF ALL RESPONSES TO PHQ QUESTIONS 1-9: 0
SUM OF ALL RESPONSES TO PHQ QUESTIONS 1-9: 0
SUM OF ALL RESPONSES TO PHQ9 QUESTIONS 1 & 2: 0

## 2021-04-07 NOTE — PROGRESS NOTES
Medicare Annual Wellness Visit  Name: Josue Landeros Date: 2021   MRN: [id number] Sex: Female   Age: 72 y.o. Ethnicity: Non-/Non    : 1950 Race: White      Eyad Aden is here for Medicare AWV    Screenings for behavioral, psychosocial and functional/safety risks, and cognitive dysfunction are all negative except as indicated below. These results, as well as other patient data from the [address] form, are documented in Flowsheets linked to this Encounter. Allergies   Allergen Reactions    Lipitor [Atorvastatin] Diarrhea and Nausea And Vomiting       Prior to Visit Medications    Medication Sig Taking? Authorizing Provider   omeprazole (PRILOSEC) 40 MG delayed release capsule TAKE 1 CAPSULE BY MOUTH ONCE DAILY Yes Padmaja South MD   butalbital-acetaminophen-caffeine (FIORICET, ESGIC) -40 MG per tablet Take 1 tablet by mouth every 6 hours as needed for Headaches Yes aPdmaja South MD   zolpidem (AMBIEN) 5 MG tablet TAKE ONE TABLET BY MOUTH AT BEDTIME. Yes Padmaja South MD   Cold Sore Products (LIP BALM) ointment Apply topically as needed. Yes Padmaja South MD   nystatin (MYCOSTATIN) 224389 UNIT/GM cream Apply topically 3 times daily to corner of lips Yes Padmaja South MD   lisinopril (PRINIVIL;ZESTRIL) 20 MG tablet TAKE (1/2) TABLET BY MOUTH TWICE DAILY FOR BLOOD PRESSURE. Yes aPdmaja South MD   rosuvastatin (CRESTOR) 5 MG tablet TAKE (1) TABLET BY MOUTH DAILY AT BEDTIME. Yes Padmaja South MD   valACYclovir (VALTREX) 1 g tablet TAKE (2) TABLETS BY MOUTH AT ONSET OF FEVER BLISTER. MAY REPEAT ONCE IN 12 HOURS.  Yes Padmaja South MD   sucralfate (CARAFATE) 1 GM tablet Take 1 tablet by mouth 4 times daily (before meals and nightly) Yes GUSTAVO Garcia - CNP   OnabotulinumtoxinA (BOTOX IJ) Inject as directed Indications: Treatment to Prevent Migraine Headaches Yes Historical Provider, MD   Galcanezumab-gnlm (EMGALITY) 120 MG/ML SOSY Inject 120 mg into the skin every 30 days Yes Historical Provider, MD   topiramate ER (TROKENDI XR) 200 MG CP24 Take by mouth Yes Historical Provider, MD   VITAMIN D, CHOLECALCIFEROL, PO Take by mouth Yes Historical Provider, MD   Lactobacillus Acidophilus POWD Take by mouth Yes Historical Provider, MD   Multiple Vitamins-Minerals (THERAPEUTIC MULTIVITAMIN-MINERALS) tablet Take 1 tablet by mouth daily Yes Historical Provider, MD   moexipril (UNIVASC) 15 MG tablet Take 15 mg by mouth nightly Yes Historical Provider, MD       Past Medical History:   Diagnosis Date    Chronic insomnia     Colon polyps     Depression     Headache(784.0)     Hypertension     Leukocytopenia     Mononucleosis     Osteoarthritis     Solar keratosis     Status post right partial knee replacement 09/08/2014        Vitamin D deficiency        Past Surgical History:   Procedure Laterality Date    KNEE SURGERY Right September 8, 2014    Partial knee replacement: Dr Joselyn Boyd         Family History   Problem Relation Age of Onset    High Blood Pressure Mother     Cancer Mother         breast    High Blood Pressure Father     Cancer Brother         throat       CareTeam (Including outside providers/suppliers regularly involved in providing care):   Patient Care Team:  Kalyey Oh MD as PCP - General (Family Medicine)  Kayley Oh MD as PCP - REHABILITATION [facility] Empaneled Provider    Wt Readings from Last 3 Encounters:   04/07/21 143 lb 12.8 oz (65.2 kg)   06/01/20 140 lb 9.6 oz (63.8 kg)   01/21/20 143 lb 3.2 oz (65 kg)     Vitals:    04/07/21 1428   BP: 112/70   Pulse: 89   Resp: 18   Temp: 97 °F (36.1 °C)   SpO2: 97%   Weight: 143 lb 12.8 oz (65.2 kg)   Height: 5' 4\" (1.626 m)     Body mass index is 24.68 kg/m². Based upon direct observation of the patient, evaluation of cognition reveals recent and remote memory intact.         Patient's complete Health Risk Assessment and screening values have been 3 yrs and older Afluria) 11/02/2018, 12/04/2019, 10/08/2020    Influenza, Quadv, IM, PF (6 mo and older Fluzone, Flulaval, Fluarix, and 3 yrs and older Afluria) 11/08/2016    Pneumococcal Conjugate 13-valent ([id number]) 12/12/2016    Pneumococcal Polysaccharide ([id number]) 01/04/2018    Zoster Live (Zostavax) 10/15/2015        Health Maintenance   Topic Date Due    Shingles Vaccine (2 of 3) 12/10/2015    Annual Wellness Visit (AWV)  Never done    DTaP/Tdap/Td vaccine (1 - Tdap) 04/07/2022 (Originally 9/21/1969)    Lipid screen  02/23/2022    Potassium monitoring  02/23/2022    Creatinine monitoring  02/23/2022    Breast cancer screen  08/20/2022    Colon cancer screen colonoscopy  09/17/2028    DEXA (modify frequency per FRAX score)  Completed    Flu vaccine  Completed    Pneumococcal 65+ years Vaccine  Completed    COVID-19 Vaccine  Completed    Hepatitis C screen  Completed    Hepatitis A vaccine  Aged Out    Hepatitis B vaccine  Aged Out    Hib vaccine  Aged Out    Meningococcal (ACWY) vaccine  Aged Out     Recommendations for [facility] Due: see orders and patient instructions/AVS.  . Recommended screening schedule for the next 5-10 years is provided to the patient in written form: see Patient Instructions/AVS.    Marisela Pennington was seen today for medicare awv. Diagnoses and all orders for this visit:    Encounter for screening mammogram for breast cancer  -     [facility] DIGITAL SCREEN W OR WO CAD BILATERAL;  Future

## 2021-04-07 NOTE — PATIENT INSTRUCTIONS
Personalized Preventive Plan for [facility] - 4/7/2021  Medicare offers a range of preventive health benefits. Some of the tests and screenings are paid in full while other may be subject to a deductible, co-insurance, and/or copay. Some of these benefits include a comprehensive review of your medical history including lifestyle, illnesses that may run in your family, and various assessments and screenings as appropriate. After reviewing your medical record and screening and assessments performed today your provider may have ordered immunizations, labs, imaging, and/or referrals for you. A list of these orders (if applicable) as well as your Preventive Care list are included within your After Visit Summary for your review. Other Preventive Recommendations:    · A preventive eye exam performed by an eye specialist is recommended every 1-2 years to screen for glaucoma; cataracts, macular degeneration, and other eye disorders. · A preventive dental visit is recommended every 6 months. · Try to get at least 150 minutes of exercise per week or 10,000 steps per day on a pedometer . · Order or download the FREE \"Exercise & Physical Activity: Your Everyday Guide\" from [employer]. Call 8-105.138.4684 or search [employer] online. · You need 0173-9500 mg of calcium and 1170-2620 IU of vitamin D per day. It is possible to meet your calcium requirement with diet alone, but a vitamin D supplement is usually necessary to meet this goal.  · When exposed to the sun, use a sunscreen that protects against both UVA and UVB radiation with an SPF of 30 or greater. Reapply every 2 to 3 hours or after sweating, drying off with a towel, or swimming. · Always wear a seat belt when traveling in a car. Always wear a helmet when riding a bicycle or motorcycle.

## 2021-04-07 NOTE — PROGRESS NOTES
SUBJECTIVE:   72 y.o. female for annual routine Pap and checkup. She actually came in for a Medicare annual wellness which was done but she has other problems we follow including essential hypertension, vitamin D deficiency and chronic insomnia for which she is on Ambien. She tries not to take it every night. She is on 5 mg. Other problems include chronic migraines. She is seeing Dr. Jennifer Cuevas who is doing Botox, Trokendi and Emgality but she still will use and Esgic-Plus occasionally which we prescribed. She is also very frustrated. She underwent pelvic floor reconstruction done in [place] by Dr. Stu Queen but she says it did not work. She feels like things are falling out. She does not want a pelvic exam today. She denies any vaginal bleeding. Mammogram was done on 8/20/2020. She usually has it done at [place]. LMP: No LMP recorded. Patient is postmenopausal.  Patient Active Problem List    Diagnosis Date Noted    Migraine with aura and without status migrainosus, not intractable 07/09/2018    Osteoarthritis     Solar keratosis     Essential hypertension     Headache     Chronic insomnia     Depression     Leukocytopenia     Colon polyps     Vitamin D deficiency      Current Outpatient Medications   Medication Sig Dispense Refill    omeprazole (PRILOSEC) 40 MG delayed release capsule TAKE 1 CAPSULE BY MOUTH ONCE DAILY 90 capsule 3    butalbital-acetaminophen-caffeine (FIORICET, ESGIC) -40 MG per tablet Take 1 tablet by mouth every 6 hours as needed for Headaches 45 tablet 0    zolpidem (AMBIEN) 5 MG tablet TAKE ONE TABLET BY MOUTH AT BEDTIME. 30 tablet 2    Cold Sore Products (LIP BALM) ointment Apply topically as needed. 1 Tube 0    nystatin (MYCOSTATIN) 362658 UNIT/GM cream Apply topically 3 times daily to corner of lips 30 g 0    lisinopril (PRINIVIL;ZESTRIL) 20 MG tablet TAKE (1/2) TABLET BY MOUTH TWICE DAILY FOR BLOOD PRESSURE.  90 tablet 3    rosuvastatin (CRESTOR) 5 MG tablet TAKE (1) TABLET BY MOUTH DAILY AT BEDTIME. 90 tablet 3    valACYclovir (VALTREX) 1 g tablet TAKE (2) TABLETS BY MOUTH AT ONSET OF FEVER BLISTER. MAY REPEAT ONCE IN 12 HOURS. 30 tablet 1    sucralfate (CARAFATE) 1 GM tablet Take 1 tablet by mouth 4 times daily (before meals and nightly) 40 tablet 3    OnabotulinumtoxinA (BOTOX IJ) Inject as directed Indications: Treatment to Prevent Migraine Headaches      Galcanezumab-gnlm (EMGALITY) 120 MG/ML SOSY Inject 120 mg into the skin every 30 days      topiramate ER (TROKENDI XR) 200 MG CP24 Take by mouth      VITAMIN D, CHOLECALCIFEROL, PO Take by mouth      Lactobacillus Acidophilus POWD Take by mouth      Multiple Vitamins-Minerals (THERAPEUTIC MULTIVITAMIN-MINERALS) tablet Take 1 tablet by mouth daily      moexipril (UNIVASC) 15 MG tablet Take 15 mg by mouth nightly       No current facility-administered medications for this visit. Past Medical History:   Diagnosis Date    Chronic insomnia     Colon polyps     Depression     Headache(784.0)     Hypertension     Leukocytopenia     Mononucleosis     Osteoarthritis     Solar keratosis     Status post right partial knee replacement 09/08/2014        Vitamin D deficiency      Past Surgical History:   Procedure Laterality Date    KNEE SURGERY Right September 8, 2014    Partial knee replacement: Dr Bonita Weaver       Family History   Problem Relation Age of Onset    High Blood Pressure Mother     Cancer Mother         breast    High Blood Pressure Father     Cancer Brother         throat     Social History     Tobacco Use    Smoking status: Never Smoker    Smokeless tobacco: Never Used   Substance Use Topics    Alcohol use: No       Allergies: Lipitor [atorvastatin]    ROS:  Feeling well. No dyspnea or chest pain on exertion. No abdominal pain, change in bowel habits, black or bloody stools. No urinary tract symptoms.  GYN ROS: none   No neurological complaints. All other systems negative. OBJECTIVE:   The patient appears well, alert, oriented x 3, in no distress. /70   Pulse 89   Temp 97 °F (36.1 °C)   Resp 18   Ht 5' 4\" (1.626 m)   Wt 143 lb 12.8 oz (65.2 kg)   SpO2 97%   BMI 24.68 kg/m²   Skin normal, no suspicious skin lesions. ENT normal.  Neck supple. No adenopathy or thyromegaly. CHUCK. Lungs are clear, good air entry, no wheezes, rhonchi or rales. S1 and S2 normal, no murmurs, regular rate and rhythm. Abdomen soft without tenderness, guarding, mass or organomegaly. Extremities show no edema, normal peripheral pulses. Neurological is normal, no focal findings. Psychiatric exam, no signs of depression. BREAST EXAM: Breasts symmetrical. No skin lesions. Nipples appear normal without discharge. No masses or axillary lymphadenopathy. No tenderness. PELVIC EXAM: Not examined today per patient's request.    ASSESSMENT:   1. Routine general medical examination at a health care facility    2. Encounter for screening mammogram for breast cancer    3. Intractable chronic migraine without aura and with status migrainosus    4. Vitamin D deficiency    5. Essential hypertension    6. Mixed hyperlipidemia    7. Leukopenia, unspecified type    8. Chronic insomnia        PLAN:   Lifestyle advice: discussed diet and exercise  MEDICATIONS:  No orders of the defined types were placed in this encounter. Continue current medications. We will refill when needed.       ORDERS:  Orders Placed This Encounter   Procedures    HENRY DIGITAL SCREEN W OR WO CAD BILATERAL   Lab Review   Nurse Only on 02/23/2021   Component Date Value    Sodium 02/23/2021 141     Potassium 02/23/2021 4.0     Chloride 02/23/2021 106     CO2 02/23/2021 22     Anion Gap 02/23/2021 13     Glucose 02/23/2021 99     BUN 02/23/2021 17     CREATININE 02/23/2021 0.7     GFR Non- 02/23/2021 >60     GFR  02/23/2021 >59 including lifestyle, illnesses that may run in your family, and various assessments and screenings as appropriate. After reviewing your medical record and screening and assessments performed today your provider may have ordered immunizations, labs, imaging, and/or referrals for you. A list of these orders (if applicable) as well as your Preventive Care list are included within your After Visit Summary for your review. Other Preventive Recommendations:    · A preventive eye exam performed by an eye specialist is recommended every 1-2 years to screen for glaucoma; cataracts, macular degeneration, and other eye disorders. · A preventive dental visit is recommended every 6 months. · Try to get at least 150 minutes of exercise per week or 10,000 steps per day on a pedometer . · Order or download the FREE \"Exercise & Physical Activity: Your Everyday Guide\" from [employer]. Call 4-620.288.4106 or search [employer] online. · You need 4985-1215 mg of calcium and 3008-0600 IU of vitamin D per day. It is possible to meet your calcium requirement with diet alone, but a vitamin D supplement is usually necessary to meet this goal.  · When exposed to the sun, use a sunscreen that protects against both UVA and UVB radiation with an SPF of 30 or greater. Reapply every 2 to 3 hours or after sweating, drying off with a towel, or swimming. · Always wear a seat belt when traveling in a car. Always wear a helmet when riding a bicycle or motorcycle. EMR Dragon/transcription disclaimer:  Much of this encounter note is electronic transcription/translation of spoken language to printed texts. The electronic translation of spoken language may be erroneous, or at times, nonsensical words or phrases may be inadvertently transcribed.   Although I have reviewed the note for such errors, some may still exist.

## 2021-04-20 DIAGNOSIS — G43.719 INTRACTABLE CHRONIC MIGRAINE WITHOUT AURA AND WITHOUT STATUS MIGRAINOSUS: ICD-10-CM

## 2021-04-21 RX ORDER — GALCANEZUMAB 120 MG/ML
INJECTION, SOLUTION SUBCUTANEOUS
Qty: 1 PEN | Refills: 0 | Status: SHIPPED | OUTPATIENT
Start: 2021-04-21 | End: 2021-12-15

## 2021-04-26 DIAGNOSIS — G43.711 INTRACTABLE CHRONIC MIGRAINE WITHOUT AURA AND WITH STATUS MIGRAINOSUS: ICD-10-CM

## 2021-04-26 RX ORDER — BUTALBITAL, ACETAMINOPHEN AND CAFFEINE 50; 325; 40 MG/1; MG/1; MG/1
TABLET ORAL
Qty: 45 TABLET | Refills: 0 | Status: SHIPPED | OUTPATIENT
Start: 2021-04-26 | End: 2021-05-11

## 2021-05-04 ENCOUNTER — OFFICE VISIT (OUTPATIENT)
Dept: NEUROLOGY | Facility: CLINIC | Age: 71
End: 2021-05-04

## 2021-05-04 VITALS
OXYGEN SATURATION: 97 % | HEIGHT: 64 IN | HEART RATE: 86 BPM | SYSTOLIC BLOOD PRESSURE: 126 MMHG | DIASTOLIC BLOOD PRESSURE: 80 MMHG | WEIGHT: 142 LBS | BODY MASS INDEX: 24.24 KG/M2

## 2021-05-04 DIAGNOSIS — M54.81 BILATERAL OCCIPITAL NEURALGIA: Primary | ICD-10-CM

## 2021-05-04 DIAGNOSIS — M79.18 CERVICAL MYOFASCIAL PAIN SYNDROME: ICD-10-CM

## 2021-05-04 PROCEDURE — 99214 OFFICE O/P EST MOD 30 MIN: CPT | Performed by: PHYSICIAN ASSISTANT

## 2021-05-04 NOTE — PROGRESS NOTES
"  Neurology Progress Note      Chief Complaint:    Chronic headache disorder  Medication overuse headache  Bilateral occipital neuralgia    Subjective     Subjective:  Patient returns to clinic today for follow-up evaluation of known, chronic headache disorder.  Patient continues to use significant amount of over-the-counter analgesics as well as butalbital.  She understands the risk and concern for medication overuse headache.  At this time, she denies any worsening headache status and states that she can \"live with it.\"  She is not interested in trying any additional headache medications and she is not interested in pursuing evaluation at the [facility].  She states that she has had one episode of administering Emgality that resulted in a \"hive.\"  She has not had any subsequent difficulties with administration of this medication.      Past Medical History:   Diagnosis Date   • Headache    • Migraine      Past Surgical History:   Procedure Laterality Date   • KNEE SURGERY       Family History   Problem Relation Age of Onset   • No Known Problems Mother    • No Known Problems Father      Social History     Tobacco Use   • Smoking status: Never Smoker   • Smokeless tobacco: Never Used   Substance Use Topics   • Alcohol use: No   • Drug use: No       Medications:  Current Outpatient Medications   Medication Sig Dispense Refill   • butalbital-acetaminophen-caffeine (FIORICET, ESGIC) -40 MG per tablet Take 1 tablet by mouth Every 4 (Four) Hours As Needed for Headache.     • Cholecalciferol (VITAMIN D PO) Take  by mouth Daily.     • Emgality 120 MG/ML auto-injector pen INJECT 1 PREFILLED SYRINGE SUBCUTANEOUSLY AS DIRECTED EVERY 30 DAYS 1 pen 0   • lisinopril (PRINIVIL,ZESTRIL) 20 MG tablet TAKE (1/2) TABLET BY MOUTH TWICE DAILY FOR BLOOD PRESSURE.     • LYSINE PO Take  by mouth Daily.     • MAGNESIUM PO Take  by mouth Daily.     • Multiple Vitamins-Minerals (MULTIVITAMIN WITH MINERALS) tablet tablet " Take 1 tablet by mouth Daily.     • omeprazole (priLOSEC) 40 MG capsule Take 40 mg by mouth As Needed.     • ondansetron (ZOFRAN) 4 MG tablet Take 4 mg by mouth Every 8 (Eight) Hours As Needed for Nausea or Vomiting.     • rosuvastatin (CRESTOR) 5 MG tablet Take 1 tablet by mouth Daily.     • Topiramate ER (Trokendi XR) 200 MG capsule sustained-release 24 hr Take 1 capsule by mouth every night at bedtime. 30 capsule 11   • valACYclovir (VALTREX) 500 MG tablet Take 1,000 mg by mouth 2 (Two) Times a Day As Needed.     • zolpidem (AMBIEN) 5 MG tablet Take 5 mg by mouth. Take one half tab at bedtime       No current facility-administered medications for this visit.       Allergies:    Atorvastatin    Review of Systems:   -A 14 point review of systems is completed and is negative.      Objective      Vital Signs  Heart Rate:  [86] 86  BP: (126)/(80) 126/80    Physical Exam:    General Exam:  Head:  Normocephalic, atraumatic.  HEENT: PERRLA.  Full EOM.  Neck:  No lymphadenopathy, thyromegaly or bruit.  Cardiac:  Regular rate and rhythm.  Normal S1, S2.  No murmur, rub or gallop.  Lungs:  Clear to auscultation bilaterally.  No wheeze, rales or rhonchi.  Abdomen:  Non-tender, Non-distended.  Bowel sounds normoactive.  Extremities: Full peripheral pulses.  No clubbing, cyanosis or edema.  Skin: No ulceration, breakdown or rash.      Neurologic Exam:  CERVICAL SPINE EXAMINATION:  RANGE OF MOTION: The patient is able to flex, extend, rotate, and side bend without pain or difficulty.  There is full range of motion.    PALPATION: Nontender to palpation midline and through upper cervical musculature.  STRENGTH: 5/5 bilateral trapezius, deltoid, triceps, biceps, wrist extensors/flexors, finger opposition.  SENSATION: Light touch and pinprick intact C5-T1 bilaterally.  REFLEXES: DTRs are 2+ bilaterally at biceps, triceps, and brachioradialis.  Soriano's and palmomental are negative bilaterally.  SPECIAL TESTS:  Tinel's & Phalen's  are negative. Spurling's is negative bilaterally.     Coordination:  -Finger to nose intact BUEs  -Heel to shin intact BLEs  -No ataxia     Gait  -No signs of ataxia  -ambulates unassisted       Results Review:        Assessment/Plan     Impression:  1.  Medication overuse headache  2.  Chronic migraine headache  3.  Cervical myofascial pain  4.  DDD cervical  5.  Cervical facet arthropathy  6.  Bilateral occipital neuralgia      Plan:  1.  I reviewed the clinical findings with the patient in detail.  I did review her previous MRI of the brain from 2018.  This does demonstrate flair signal abnormality consistent with migraine headache disorder.    2.  At this time, she will continue Emgality, however, if she has any subsequent reaction, she will notify me, take Benadryl immediately and will discontinue the medication.  It is possible that she would tolerate an alternate CGRP monoclonal antibody injections such as Aimovig.    3.  We discussed possibility of Reyvow, however, she does not have insurance coverage to afford this medication.    4.  Referral to physical therapy for cervical myofascial pain, cervical traction and treating occipital neuralgia.    5.  Discussed the possibility of referral to the [facility] in [place], [place].  She declines this at this time.    6.  At this time, the patient will follow up with me on an as-needed basis.  I am certainly happy to reevaluate at any point in future if the patient decides that her headaches have escalated her head she would like further assistance in managing these on an ongoing basis.    7.  I reiterated that ongoing use of butalbital-containing products increase her risk of perpetuating chronic migraine by 6 fold.  Additionally, she should avoid the use of regular over-the-counter analgesics and NSAIDs.    Greater than 25 minutes spent preparing the patient's visit in chart, reviewing past history and diagnostic studies including imaging and laboratory  evaluation, obtaining clinical history, performing physical examination, and counseling the patient on the prescribed plan of therapy and care, ordering diagnostic testing, making appropriate referrals, documenting the encounter and interpretation of results and coordination of care.          Sebastien Valencia PA-C  05/04/21  11:22 CDT

## 2021-05-11 DIAGNOSIS — G43.711 INTRACTABLE CHRONIC MIGRAINE WITHOUT AURA AND WITH STATUS MIGRAINOSUS: ICD-10-CM

## 2021-05-11 RX ORDER — BUTALBITAL, ACETAMINOPHEN AND CAFFEINE 50; 325; 40 MG/1; MG/1; MG/1
TABLET ORAL
Qty: 45 TABLET | Refills: 0 | Status: SHIPPED | OUTPATIENT
Start: 2021-05-11 | End: 2021-06-07

## 2021-05-12 NOTE — TELEPHONE ENCOUNTER
Please call patient. She is evidently going through 45 every 2 weeks. This is more than 2 a day. I really need her to see a neurologist or try something different because she is taking them so often and they are addicting. Can she decrease the amount she is taking?

## 2021-05-18 ENCOUNTER — TREATMENT (OUTPATIENT)
Dept: PHYSICAL THERAPY | Facility: CLINIC | Age: 71
End: 2021-05-18

## 2021-05-18 DIAGNOSIS — R51.9 CHRONIC INTRACTABLE HEADACHE, UNSPECIFIED HEADACHE TYPE: ICD-10-CM

## 2021-05-18 DIAGNOSIS — M25.60 RANGE OF MOTION DEFICIT: ICD-10-CM

## 2021-05-18 DIAGNOSIS — G43.019 INTRACTABLE MIGRAINE WITHOUT AURA AND WITHOUT STATUS MIGRAINOSUS: Primary | ICD-10-CM

## 2021-05-18 DIAGNOSIS — G89.29 CHRONIC INTRACTABLE HEADACHE, UNSPECIFIED HEADACHE TYPE: ICD-10-CM

## 2021-05-18 PROCEDURE — 97161 PT EVAL LOW COMPLEX 20 MIN: CPT | Performed by: PHYSICAL THERAPIST

## 2021-05-18 PROCEDURE — 97140 MANUAL THERAPY 1/> REGIONS: CPT | Performed by: PHYSICAL THERAPIST

## 2021-05-18 NOTE — PROGRESS NOTES
Physical Therapy Therapy Initial Evaluation and Plan of Care    Patient: Vincent EID Gray               : 1950  Visit Date: 2021  Referring practitioner: Sebastien Valencia PA-C  Date of Initial Visit: 2021  Patient seen for 1 sessions    Visit Diagnoses:    ICD-10-CM ICD-9-CM   1. Intractable migraine without aura and without status migrainosus  G43.019 346.11   2. Chronic intractable headache, unspecified headache type  R51.9 784.0    G89.29    3. Range of motion deficit  M25.60 719.50     Past Medical History:   Diagnosis Date   • Headache    • Migraine      Past Surgical History:   Procedure Laterality Date   • KNEE SURGERY (B) TKA         SUBJECTIVE     Subjective Evaluation    History of Present Illness    Subjective comment: She reports chronic HAs that run in her family. She reports botox and emgality helps. She reports botox is expensive and insurance doesn't cover. Pain  Current pain rating: 3 (with medication)  At best pain ratin  At worst pain ratin  Location: posterior neck, eyes, temporal  Quality: dull ache  Relieving factors: rest, medications and ice (late afternoon and night; immediately after she eats)  Exacerbated by: stress, AM.    Treatments  Previous treatment: medication and injection treatment (accupuncture )       PT G-Codes  Outcome Measure Options: Neck Disability Index (NDI)  Neck Disability Index Score: 22    OBJECTIVE     Objective          Postural Observations  Seated posture: fair  Standing posture: fair    Additional Postural Observation Details  Hinges mid-lower cervical, flattened upper cervical, increased upper thoracic kyphosis    Palpation   Left   Hypertonic in the suboccipitals and upper trapezius.   Tenderness of the suboccipitals and upper trapezius.     Right   Hypertonic in the cervical paraspinals, suboccipitals and upper trapezius. Tenderness of the cervical paraspinals, suboccipitals and upper trapezius.     Neurological Testing     Sensation    Cervical/Thoracic   Left   Intact: light touch    Right   Intact: light touch    Active Range of Motion   Cervical/Thoracic Spine   Cervical    Flexion: WFL  Extension: 28 degrees   Left rotation: 62 degrees   Right rotation: 55 degrees     Passive Range of Motion     Additional Passive Range of Motion Details  Decreased upper cervical segmental motion.     Strength/Myotome Testing     Left Shoulder     Planes of Motion   Flexion: WFL     Right Shoulder     Planes of Motion   Flexion: WFL     Left Elbow   Flexion: WFL  Extension: WFL    Right Elbow   Flexion: WFL  Extension: WFL    Additional Strength Details   R 4-/5 L 5/5 Had recent R thumb surgery       Therapy Education/Self Care [id number]   Details: Dry needling   Given Home Exercise Program  postural retraining  symptom relief   Progress: New   Education provided to:  Patient   Level of understanding Verbalized   Timed Minutes      Manual Therapy     [id number]  Comments   HL STM to cervical paraspinals and suboccipitals                     Timed Minutes 10        NDI:  22      Total Timed Treatment:     10  mins  Total Time of Visit:            50   mins    ASSESSMENT/PLAN   GOALS:  Goals                                          Progress Note due by 6/19/21   STG by: 4 weeks Comments Status   Pt will demonstrate 65 degrees or greater cervical rotation (B)  New   LTG by: 8 weeks     Pt will score 10 or less on the NDI.   New   Pt will be independent with HEP including postural strengthening.   New        Pt will demonstrate 4/5 or greater scapular strength (B)   New   Pt will report pain no greater than 1-2/10 throughout one day.   New     Assessment & Plan     Assessment  Impairments: abnormal or restricted ROM, activity intolerance, impaired physical strength, lacks appropriate home exercise program and pain with function  Assessment details: Vincent presents with long history of HAs and neck pain. Upon assessment today she has decreased cervical ROM with R  paraspinal and (B) suboccipital muscle guarding/tightness. She has slightly decreased posture with an increased upper thoracic kyphosis and tends to have a large hinge point mid-lower cervical. Skilled PT needed to address these deficits and work towards independent HEP.   Prognosis: good  Functional Limitations: sleeping, uncomfortable because of pain, sitting and unable to perform repetitive tasks  Plan  Therapy options: will be seen for skilled physical therapy services  Planned modality interventions: dry needling, TENS, low level laser therapy and electrical stimulation/Russian stimulation  Planned therapy interventions: abdominal trunk stabilization, ADL retraining, flexibility, functional ROM exercises, home exercise program, IADL retraining, joint mobilization, therapeutic activities, stretching, strengthening, spinal/joint mobilization, soft tissue mobilization, postural training, neuromuscular re-education and manual therapy  Frequency: 2x week  Duration in visits: 12  Duration in weeks: 6  Treatment plan discussed with: patient  Plan details: Initially we will work on soft tissue and joint restrictions, including use of dry needling. We will then progress with postural retraining and updating HEP to reflect.         PT SIGNATURE: Kandace Ross, PT DPT       Initial Certification  Certification Period: 8/16/2021  I certify that the therapy services are furnished while this patient is under my care.  The services outlined above are required by this patient, and will be reviewed every 90 days.     PHYSICIAN:     Sebastien Valencia PA-C______________________________________DATE: _________     Please sign and return via fax to [phone].   Thank you so much for letting us work with Vincent. I appreciate your letting us work with your patients. If you have any questions or concerns, please don't hesitate to contact me.

## 2021-05-24 ENCOUNTER — TREATMENT (OUTPATIENT)
Dept: PHYSICAL THERAPY | Facility: CLINIC | Age: 71
End: 2021-05-24

## 2021-05-24 DIAGNOSIS — G89.29 CHRONIC INTRACTABLE HEADACHE, UNSPECIFIED HEADACHE TYPE: ICD-10-CM

## 2021-05-24 DIAGNOSIS — G43.019 INTRACTABLE MIGRAINE WITHOUT AURA AND WITHOUT STATUS MIGRAINOSUS: Primary | ICD-10-CM

## 2021-05-24 DIAGNOSIS — R51.9 CHRONIC INTRACTABLE HEADACHE, UNSPECIFIED HEADACHE TYPE: ICD-10-CM

## 2021-05-24 DIAGNOSIS — M25.60 RANGE OF MOTION DEFICIT: ICD-10-CM

## 2021-05-24 PROCEDURE — 97140 MANUAL THERAPY 1/> REGIONS: CPT | Performed by: PHYSICAL THERAPIST

## 2021-05-24 NOTE — PROGRESS NOTES
"Physical Therapy Treatment Note    Patient: Vincent EID Gray                                                                                     Visit Date: 2021  :     1950    Referring practitioner:    Sebastien Valencia PA-C  Date of Initial Visit:          Type: THERAPY  Noted: 2021    Patient seen for 2 sessions    Visit Diagnoses:    ICD-10-CM ICD-9-CM   1. Intractable migraine without aura and without status migrainosus  G43.019 346.11   2. Chronic intractable headache, unspecified headache type  R51.9 784.0    G89.29    3. Range of motion deficit  M25.60 719.50     SUBJECTIVE     Subjective She says her neck pain and headache isn't too bad.     PAIN: 3/10         OBJECTIVE     Objective        Dry Needle Location [ (1-2 muscles)/ (3 or more muscles)]   Location Size (\") Comment   bilateral accessory n/UT 2    bilateral dorsal scap n 2    bilateral C3,5 post cut n 1,2    bilateral inf cerv oblique n 1    bilateral subocc mms  UT/Splenius cap mms     TRIAL     Manual Therapy     [id number]  Comments       Prone CT extension and rotation mobs Sustained manual OP   Prone tanja UT/LS STM/TrP release    Facet joint upglide mobs mid to upper cervical Gr 2-3 repetitive   Supine manual cervical traction     Supine subocc release    Timed Minutes 25       Therapy Education/Self Care [id number]   Details: Scapular and cervical retraction  Posture  Physiology of dry needling   Given Home Exercise Program  postural retraining  symptom relief   Progress: New   Education provided to:  Patient   Level of understanding Verbalized   Timed Minutes      25    Total Timed Treatment:     25   mins  Total Time of Visit:             45   mins         ASSESSMENT/PLAN     GOALS  Goals                                          Progress Note due by 21   STG by: 4 weeks Comments Status   Pt will demonstrate 65 degrees or greater cervical rotation (B)  ongoing   LTG by: 8 weeks     Pt will score 10 or less on the NDI.   " ongoing   Pt will be independent with HEP including postural strengthening.   ongoing   Pt will demonstrate 4/5 or greater scapular strength (B)   ongoing   Pt will report pain no greater than 1-2/10 throughout one day.   ongoing       Assessment/Plan     ASSESSMENT: Today was her first visit after her eval so no goals met today. She did feel looser, particularly immediately with left rotation, immediately after the dry needling. She still needs emphasis on manual therapy to looser her CT area as well as her upper neck to decreased the facet compression triggering her headaches.     PLAN: cont to work on mobility of her neck and postural retraining. Continue DN if needed.     Signature: Carrillo Chavarria, PT

## 2021-05-28 ENCOUNTER — TELEPHONE (OUTPATIENT)
Dept: PRIMARY CARE CLINIC | Age: 71
End: 2021-05-28

## 2021-05-28 NOTE — TELEPHONE ENCOUNTER
Pt is asking if you will take over Rx for emgality. Her Dr that has been prescribing states she no longer needs to go to the office and can have PCP prescribe. She was given samples due to her being Rx at the beginning of the moth.

## 2021-05-30 RX ORDER — GALCANEZUMAB 120 MG/ML
120 INJECTION, SOLUTION SUBCUTANEOUS
Qty: 1 ML | Refills: 5 | Status: SHIPPED | OUTPATIENT
Start: 2021-05-30 | End: 2022-01-10

## 2021-06-07 ENCOUNTER — TREATMENT (OUTPATIENT)
Dept: PHYSICAL THERAPY | Facility: CLINIC | Age: 71
End: 2021-06-07

## 2021-06-07 DIAGNOSIS — G89.29 CHRONIC INTRACTABLE HEADACHE, UNSPECIFIED HEADACHE TYPE: ICD-10-CM

## 2021-06-07 DIAGNOSIS — G43.019 INTRACTABLE MIGRAINE WITHOUT AURA AND WITHOUT STATUS MIGRAINOSUS: Primary | ICD-10-CM

## 2021-06-07 DIAGNOSIS — M25.60 RANGE OF MOTION DEFICIT: ICD-10-CM

## 2021-06-07 DIAGNOSIS — R51.9 CHRONIC INTRACTABLE HEADACHE, UNSPECIFIED HEADACHE TYPE: ICD-10-CM

## 2021-06-07 DIAGNOSIS — G43.711 INTRACTABLE CHRONIC MIGRAINE WITHOUT AURA AND WITH STATUS MIGRAINOSUS: ICD-10-CM

## 2021-06-07 PROCEDURE — 97140 MANUAL THERAPY 1/> REGIONS: CPT | Performed by: PHYSICAL THERAPIST

## 2021-06-07 RX ORDER — BUTALBITAL, ACETAMINOPHEN AND CAFFEINE 50; 325; 40 MG/1; MG/1; MG/1
TABLET ORAL
Qty: 45 TABLET | Refills: 0 | Status: SHIPPED | OUTPATIENT
Start: 2021-06-07 | End: 2021-07-06

## 2021-06-07 NOTE — PROGRESS NOTES
Physical Therapy Treatment Note    Patient: Vincent EID Gray                                                                                     Visit Date: 2021  :     1950    Referring practitioner:    Sebastien Valencia PA-C  Date of Initial Visit:          Type: THERAPY  Noted: 2021    Patient seen for 3 sessions    Visit Diagnoses:    ICD-10-CM ICD-9-CM   1. Intractable migraine without aura and without status migrainosus  G43.019 346.11   2. Chronic intractable headache, unspecified headache type  R51.9 784.0    G89.29    3. Range of motion deficit  M25.60 719.50     SUBJECTIVE     Subjective She reports having a HA today and had to take her medicine that she is not supposed to take per her doctor. She reports that this helped with her HA but she is still having some pain. She reports that she DID NOT like the dry needling and has been sore ever since. She reports that she will not try this again. She reports that she feels like her neck may have some more ROM.     PAIN: 3/10 HA pre treatment   0/10 post session          OBJECTIVE     Objective          Manual Therapy     [id number]  Comments   IASTM with pink roller to upper thoracic.     Prone CT extension and rotation mobs Sustained manual OP   Seated B UT STM with massage cream     Facet joint upglide mobs mid to upper cervical Gr 2-3 repetitive   Supine manual cervical traction     Supine subocc release    Timed Minutes 45       Therapy Education/Self Care 92366   Details:    Given Home Exercise Program  postural retraining  symptom relief   Progress: Reinforced   Education provided to:  Patient   Level of understanding Verbalized   Timed Minutes      25    Total Timed Treatment:     45   mins  Total Time of Visit:             45   mins         ASSESSMENT/PLAN     GOALS  Goals                                          Progress Note due by 21                                                       Re-cert due by: 21    STG by: 4 weeks Comments  "Status Date Last Assessed   Pt will demonstrate 65 degrees or greater cervical rotation (B)  ongoing    LTG by: 8 weeks      Pt will score 10 or less on the NDI.   ongoing    Pt will be independent with HEP including postural strengthening.   ongoing    Pt will demonstrate 4/5 or greater scapular strength (B)   ongoing    Pt will report pain no greater than 1-2/10 throughout one day.  3/10 on average, 0/10 post session today  ongoing 6/7/21       Assessment/Plan     ASSESSMENT: She reports not responding well to the DN but has noticed some overall improvements in her ROM. She was still very stiff today throughout her CT junction and in her cervical spine. She tolerated moderate pressure with STM to B UT's today as well as cervical paraspinals but reported ongoing \"soreness\" from the previously performed DN.     PLAN: Continue with manual techniques to improve mobility in her CT junction and cervical spine as well as soft tissue work as needed.      Signature: Romy Lorenzo PTA    "

## 2021-06-09 ENCOUNTER — TREATMENT (OUTPATIENT)
Dept: PHYSICAL THERAPY | Facility: CLINIC | Age: 71
End: 2021-06-09

## 2021-06-09 DIAGNOSIS — R51.9 CHRONIC INTRACTABLE HEADACHE, UNSPECIFIED HEADACHE TYPE: ICD-10-CM

## 2021-06-09 DIAGNOSIS — G89.29 CHRONIC INTRACTABLE HEADACHE, UNSPECIFIED HEADACHE TYPE: ICD-10-CM

## 2021-06-09 DIAGNOSIS — M25.60 RANGE OF MOTION DEFICIT: ICD-10-CM

## 2021-06-09 DIAGNOSIS — G43.019 INTRACTABLE MIGRAINE WITHOUT AURA AND WITHOUT STATUS MIGRAINOSUS: Primary | ICD-10-CM

## 2021-06-09 PROCEDURE — 97140 MANUAL THERAPY 1/> REGIONS: CPT | Performed by: PHYSICAL THERAPIST

## 2021-06-09 NOTE — PROGRESS NOTES
I have reviewed the notes, assessments, and/or procedures performed by Keysha Gomez PT, I concur with her/his documentation of Vincent Gray.  The clinical instructor and/or supervising staff, Carrillo Chavarria PT, was present in clinic guiding the student by approving, concurring, and confirming the skilled judgement for all services rendered.       Signature:  Carrillo Chavarria PT

## 2021-06-09 NOTE — PROGRESS NOTES
Physical Therapy Treatment Note    Patient: Vincent EID Gray                                                                                     Visit Date: 2021  :     1950    Referring practitioner:    Sebastien Valencia PA-C  Date of Initial Visit:          Type: THERAPY  Noted: 2021    Patient seen for 4 sessions    Visit Diagnoses:    ICD-10-CM ICD-9-CM   1. Intractable migraine without aura and without status migrainosus  G43.019 346.11   2. Chronic intractable headache, unspecified headache type  R51.9 784.0    G89.29    3. Range of motion deficit  M25.60 719.50     SUBJECTIVE     Subjective Patient stated that his AM she had a horrible headache, has improved following taking medication. Felt great after previous session, no complaints. She states she gets about 1 hour of relief after PT, feels as though her neck motion is improving.      PAIN: 1/10 HA pre treatment   0/10 post session          OBJECTIVE     Objective        Manual Therapy     03687  Comments   IASTM with foam roller to upper thoracic.     Prone CT extension and rotation mobs Sustained manual OP   Prone B UT STM     Facet joint upglide mobs mid to upper cervical Gr 2-3 repetitive   Mid to upper cervical R/L side glides     Supine manual cervical traction     Supine subocc release    Timed Minutes 55       Therapy Education/Self Care 55637   Details: Instructed patient on seated B shoulder ER for improved posture    Given Home Exercise Program  postural retraining  symptom relief   Progress: Reinforced   Education provided to:  Patient   Level of understanding Verbalized   Timed Minutes        Total Timed Treatment:     55   mins  Total Time of Visit:             55   mins         ASSESSMENT/PLAN     GOALS  Goals                                          Progress Note due by 21                                                       Re-cert due by: 21    STG by: 4 weeks Comments Status Date Last Assessed   Pt will demonstrate  65 degrees or greater cervical rotation (B)  ongoing    LTG by: 8 weeks      Pt will score 10 or less on the NDI.   ongoing    Pt will be independent with HEP including postural strengthening.  Instructed patient on postural exercise today, will progress as patient is able  ongoing 6/9/21   Pt will demonstrate 4/5 or greater scapular strength (B)   ongoing    Pt will report pain no greater than 1-2/10 throughout one day.  Patient had bad HA this morning, however improved with medication. 1/10 upon arrival to session ongoing 6/9/21       Assessment/Plan     ASSESSMENT: Soreness from DN improved, patient stated she did not want DN again. Patient demonstrated muscle tightness of B UT, suboccipitals and cervical paraspinals. Patient responded well to moderate pressure STM and facet joint upglides/side glides, demonstrated improved motion and decreased muscle guarding. She will be out of town next week and requested to not schedule more visits at this time. Will contact office should needs arise.     PLAN: Should patient return, continue to address cervical soft tissue and joint mobility restrictions. Progress postural retraining as patient is able.       Signature: Keysha Gomez, PT

## 2021-07-06 DIAGNOSIS — G43.711 INTRACTABLE CHRONIC MIGRAINE WITHOUT AURA AND WITH STATUS MIGRAINOSUS: ICD-10-CM

## 2021-07-06 RX ORDER — BUTALBITAL, ACETAMINOPHEN AND CAFFEINE 50; 325; 40 MG/1; MG/1; MG/1
TABLET ORAL
Qty: 45 TABLET | Refills: 0 | Status: SHIPPED | OUTPATIENT
Start: 2021-07-06 | End: 2021-08-02

## 2021-08-06 DIAGNOSIS — F51.04 CHRONIC INSOMNIA: ICD-10-CM

## 2021-08-06 RX ORDER — ZOLPIDEM TARTRATE 5 MG/1
TABLET ORAL
Qty: 30 TABLET | Refills: 0 | Status: SHIPPED | OUTPATIENT
Start: 2021-08-06 | End: 2021-08-31

## 2021-08-24 DIAGNOSIS — Z12.31 ENCOUNTER FOR SCREENING MAMMOGRAM FOR BREAST CANCER: ICD-10-CM

## 2021-08-31 DIAGNOSIS — F51.04 CHRONIC INSOMNIA: ICD-10-CM

## 2021-08-31 RX ORDER — ZOLPIDEM TARTRATE 5 MG/1
TABLET ORAL
Qty: 30 TABLET | Refills: 0 | Status: SHIPPED | OUTPATIENT
Start: 2021-08-31 | End: 2021-09-27

## 2021-09-21 DIAGNOSIS — G43.019 INTRACTABLE MIGRAINE WITHOUT AURA AND WITHOUT STATUS MIGRAINOSUS: ICD-10-CM

## 2021-09-21 RX ORDER — TOPIRAMATE 200 MG/1
200 CAPSULE, EXTENDED RELEASE ORAL NIGHTLY
Qty: 30 CAPSULE | Refills: 5 | Status: SHIPPED | OUTPATIENT
Start: 2021-09-21 | End: 2022-03-01 | Stop reason: SDUPTHER

## 2021-09-21 RX ORDER — TOPIRAMATE 200 MG/1
CAPSULE, EXTENDED RELEASE ORAL
Qty: 30 CAPSULE | Refills: 0 | OUTPATIENT
Start: 2021-09-21

## 2021-09-21 NOTE — TELEPHONE ENCOUNTER
Message left for the nurse at Dr. Goldsmith's office requesting they prescribe Trokendi, she is PRN at our office.  Message left for Vincent requesting a return call.

## 2021-10-26 DIAGNOSIS — G43.711 INTRACTABLE CHRONIC MIGRAINE WITHOUT AURA AND WITH STATUS MIGRAINOSUS: ICD-10-CM

## 2021-10-26 RX ORDER — BUTALBITAL, ACETAMINOPHEN AND CAFFEINE 50; 325; 40 MG/1; MG/1; MG/1
TABLET ORAL
Qty: 45 TABLET | Refills: 0 | Status: SHIPPED | OUTPATIENT
Start: 2021-10-26 | End: 2021-11-23

## 2021-11-17 ENCOUNTER — TELEPHONE (OUTPATIENT)
Dept: NEUROLOGY | Facility: CLINIC | Age: 71
End: 2021-11-17

## 2021-11-23 DIAGNOSIS — G43.711 INTRACTABLE CHRONIC MIGRAINE WITHOUT AURA AND WITH STATUS MIGRAINOSUS: ICD-10-CM

## 2021-11-23 RX ORDER — BUTALBITAL, ACETAMINOPHEN AND CAFFEINE 50; 325; 40 MG/1; MG/1; MG/1
TABLET ORAL
Qty: 45 TABLET | Refills: 0 | Status: SHIPPED | OUTPATIENT
Start: 2021-11-23 | End: 2021-12-20

## 2021-11-23 NOTE — TELEPHONE ENCOUNTER
Provider needs to review PDMP    PDMP Monitoring:    Last PDMP Paola Ball as Reviewed [facility]):  Review User Review Instant Review Result          Urine Drug Screenings (1 yr)     POCT Rapid Drug Screen  Collected: 7/27/2020 12:22 PM (Final result)    Complete Results              Medication Contract and Consent for Opioid Use Documents Filed     Patient Documents     Type of Document Status Date Received Received By Description    Medication Contract Signed 1/23/2018  1:40 PM Bhavana Dhaliwal     Medication Contract Received 8/6/2019  5:19 PM ALBA FREDERICK Medication agreement 7-8-2019    Medication Contract Received 7/28/2020 12:39 PM Peyton Mortimer

## 2021-12-08 ENCOUNTER — TELEPHONE (OUTPATIENT)
Dept: NEUROLOGY | Facility: CLINIC | Age: 71
End: 2021-12-08

## 2021-12-08 DIAGNOSIS — G43.719 INTRACTABLE CHRONIC MIGRAINE WITHOUT AURA AND WITHOUT STATUS MIGRAINOSUS: Primary | ICD-10-CM

## 2021-12-08 NOTE — TELEPHONE ENCOUNTER
I spoke with Vincent and let her know when she is scheduled for Botox and to order her Botox from [facility]. She did voice understanding.

## 2021-12-08 NOTE — TELEPHONE ENCOUNTER
"Caller: Vincent Gray    Relationship: Self    Best call back number: [phone]    What was the call regarding: PT CALLED STATING SHE JUST GOT OFF OF THE [facility] WITH [facility]. PT STATES THE PHARMACY HAS INFORMED HER THAT A PRIOR AUTHORIZATION WILL BE REQUIRED AS IT HAS NOT YET BEEN APPROVED. [facility] STATES PA WILL HAVE TO BE SUBMITTED TODAY AND MARKED AS \"URGENT/RUSH\" FOR THEM TO PROCESS BY 12/15/21 AS PT STATES SHE HAS AN APPT FOR BOTOX THIS DATE.     TO NOTE: I DO NOT SEE WHERE PT HAS ANY APPTS WITH DR. PARSONS ON 12/15/21 FOR BOTOX INJECTIONS.    Do you require a callback: YES, PLEASE, WITH CONFIRMATION AS PT STATES SHE WILL NEED TO CALL [facility] BACK.    PLEASE REVIEW AND ADVISE.      "

## 2021-12-09 NOTE — TELEPHONE ENCOUNTER
Caller: INA  Phone Number: [phone]    Reason for Call: PT CALLED BACK AND STATES [facility] GAVE HER A PHONE NUMBER [phone] TO CALL IN VERBALLY AS WELL.     I DID ADVISE HER THAT BOTH MESSAGES HAS BEEN ROUTED

## 2021-12-13 NOTE — TELEPHONE ENCOUNTER
PT IS CALLING TO STATE THAT SHE WAS TOLD SHE WAS SCHEDULED FOR BOTOX ON 12/15 @ 2:30.  THERE IS NOTHING REFLECTING THAT IN HER CHART.  PLEASE ADVISE.

## 2021-12-13 NOTE — TELEPHONE ENCOUNTER
KAYCE FROM [facility] CALLED TO FOLLOW UP ON THIS.    SHE IS UNDER IMPRESSION PATIENT WAS SCHED FOR BOTOX 12-15. SAYS SHE GOT THIS INFO FROMPATIENT    FOR VERBAL FOR CLINICAL INFO NEEDED FOR PA, PHONE # IS [phone]

## 2021-12-14 NOTE — TELEPHONE ENCOUNTER
PT STATES THAT THIS HAS TO BE CALLED IN RIGHT NOW OR IT WON'T MAKE IT BY TOMORROW. PT STATES THAT IT WILL BE 72 HOURS BEFORE THIS IS RECEIVED.

## 2021-12-14 NOTE — TELEPHONE ENCOUNTER
PT STATES THAT SHE'S SCHEDULE FOR TOMORROW @ 2:30 (NO APPT SCHEDULED), PT STATES THAT ESTELLA HAS TOLD HER THAT THEY ONLY WAY SHE COULD GET HET BOTOX SENT THAT FAST IS TO HAVE THE OFFICE CALL FOR URGENCY.   SPOKE WITH CRYSTAL WHO STATES THAT PT SHOULD COME IN @ 1:30 AND THAT NOEL WILL CALL [facility] TO GET PTS BOTOX URGENT. ADVISED PT.

## 2021-12-15 ENCOUNTER — PROCEDURE VISIT (OUTPATIENT)
Dept: NEUROLOGY | Facility: CLINIC | Age: 71
End: 2021-12-15

## 2021-12-15 DIAGNOSIS — G43.019 INTRACTABLE MIGRAINE WITHOUT AURA AND WITHOUT STATUS MIGRAINOSUS: ICD-10-CM

## 2021-12-15 DIAGNOSIS — G43.719 INTRACTABLE CHRONIC MIGRAINE WITHOUT AURA AND WITHOUT STATUS MIGRAINOSUS: Primary | ICD-10-CM

## 2021-12-15 PROCEDURE — 64615 CHEMODENERV MUSC MIGRAINE: CPT | Performed by: PSYCHIATRY & NEUROLOGY

## 2021-12-15 NOTE — TELEPHONE ENCOUNTER
Pt called in to see if botox was delivered advised per notes that it has not. Called  and ask to speak to je. Was told that she is out until next week. And was advised to cancel the appt and wait for ej to rs/ when I told the pt what the plan was the pt was really upset that the medication was not deceived on time because we did not put a rush on it (never mentioned if we did or not) called  again spoke to august and she checked and we have sample of the medication for the pt we can use for the botox and that she will let the pt know.. transferred the call to august .

## 2021-12-20 DIAGNOSIS — G43.711 INTRACTABLE CHRONIC MIGRAINE WITHOUT AURA AND WITH STATUS MIGRAINOSUS: ICD-10-CM

## 2021-12-20 RX ORDER — BUTALBITAL, ACETAMINOPHEN AND CAFFEINE 50; 325; 40 MG/1; MG/1; MG/1
1 TABLET ORAL EVERY 6 HOURS PRN
Qty: 45 TABLET | Refills: 0 | Status: SHIPPED | OUTPATIENT
Start: 2021-12-20 | End: 2022-01-11 | Stop reason: SDUPTHER

## 2021-12-20 RX ORDER — BUTALBITAL, ACETAMINOPHEN AND CAFFEINE 50; 325; 40 MG/1; MG/1; MG/1
TABLET ORAL
Qty: 45 TABLET | Refills: 0 | Status: SHIPPED | OUTPATIENT
Start: 2021-12-20 | End: 2021-12-20 | Stop reason: SDUPTHER

## 2021-12-22 RX ORDER — VALACYCLOVIR HYDROCHLORIDE 1 G/1
TABLET, FILM COATED ORAL
Qty: 30 TABLET | Refills: 0 | Status: SHIPPED | OUTPATIENT
Start: 2021-12-22 | End: 2022-01-10 | Stop reason: SDUPTHER

## 2021-12-28 ENCOUNTER — TELEPHONE (OUTPATIENT)
Dept: PRIMARY CARE CLINIC | Age: 71
End: 2021-12-28

## 2021-12-28 NOTE — TELEPHONE ENCOUNTER
PT need an appt before the Ambien can be refilled. Not been seen since April. Has to be seen every 6 months. Attempt was made to contact PT. VM was left for PT to contact office.

## 2021-12-28 NOTE — TELEPHONE ENCOUNTER
----- Message from Zechariah Winslow sent at 12/28/2021 10:38 AM CST -----  Subject: Refill Request    QUESTIONS  Name of Medication? zolpidem (AMBIEN) 5 MG tablet  Patient-reported dosage and instructions? 5mg one per day  How many days do you have left? 0  Preferred Pharmacy? [facility] phone number (if available)? [phone]  ---------------------------------------------------------------------------  --------------  CALL BACK INFO  What is the best way for the office to contact you? OK to leave message on   voicemail  Preferred Call Back Phone Number?  [phone]

## 2021-12-30 DIAGNOSIS — F51.04 CHRONIC INSOMNIA: Primary | ICD-10-CM

## 2021-12-30 RX ORDER — ZOLPIDEM TARTRATE 5 MG/1
5 TABLET ORAL NIGHTLY PRN
Qty: 30 TABLET | Refills: 0 | Status: SHIPPED | OUTPATIENT
Start: 2021-12-30 | End: 2022-01-11 | Stop reason: SDUPTHER

## 2021-12-30 NOTE — TELEPHONE ENCOUNTER
PDMP Monitoring:    Last PDMP Martha Son as Reviewed [facility]):  Review User Review Instant Review Result   Jose Karrie 11/23/2021 12:50 PM Reviewed PDMP [1]     Urine Drug Screenings (1 yr)     POCT Rapid Drug Screen  Collected: 7/27/2020 12:22 PM (Final result)    Complete Results              Medication Contract and Consent for Opioid Use Documents Filed     Patient Documents     Type of Document Status Date Received Received By Description    Medication Contract Signed 1/23/2018  1:40 PM Anni Salazar     Medication Contract Received 8/6/2019  5:19 PM ALBA FREDERICK Medication agreement 7-8-2019    Medication Contract Received 7/28/2020 12:39 PM Remedios Meléndez

## 2022-01-04 NOTE — PROGRESS NOTES
Procedure Note:  Vincent Gray  12/15/2021    Procedure:  Botulinum Toxin Injection #4  Indication for Procedure: Chronic Migraines    The risks and benefits were explained to the patient including local infection, mild bleeding, paralysis of muscles, and possible allergic reaction.  The patient expressed understanding and informed consent was obtained.    Initial Headache Frequency: 30/30 Days  Current Headache Frequency: 5/30 Days    Initial Duration (hours): 12/24 Hours  Current Duration (hours): 5/24 Hours     10 Units divided in 2 sites: 5 Units Right, 5 Units Left  Procerus: 5 Units  Frontalis 20 units divided in 4 sites: 10 Units Right, 10 Units Left  Temporalis 40 Units divided in 8 sites: 20 Units Right, 20 Units Left  Occipitalis 30 Units divided in 6 sites: 15 Units Right, 15 Units Left  Cervical Paraspinal 20 Units divided in 4 sites: 10 Units Right, 10 Units Left  Trapezius 30 Units divided in 6 sites: 15 Units Right, 15 Units Left              200 units were mixed in total with 45 units being discarded.    The patient tolerated the procedure well with no complications and no blood loss.    Vincent Gray provided her own Botox for injection.    Follow up for repeat injections in 12 weeks.    Scribed for Horace Alvarez MD by Nahun Zarco MA. 1/4/2022  14:04 CST    I, Dr. Horace Alvarez have reviewed the notes, assessments, and/or procedures performed by me, I concur with her/his documentation of Vincent Gray.

## 2022-01-10 ENCOUNTER — OFFICE VISIT (OUTPATIENT)
Dept: PRIMARY CARE CLINIC | Age: 72
End: 2022-01-10
Payer: MEDICARE

## 2022-01-10 ENCOUNTER — TELEPHONE (OUTPATIENT)
Dept: NEUROLOGY | Facility: CLINIC | Age: 72
End: 2022-01-10

## 2022-01-10 VITALS
DIASTOLIC BLOOD PRESSURE: 74 MMHG | HEIGHT: 64 IN | TEMPERATURE: 97.7 F | SYSTOLIC BLOOD PRESSURE: 130 MMHG | HEART RATE: 82 BPM | WEIGHT: 142 LBS | BODY MASS INDEX: 24.24 KG/M2 | OXYGEN SATURATION: 97 %

## 2022-01-10 DIAGNOSIS — E78.2 MIXED HYPERLIPIDEMIA: ICD-10-CM

## 2022-01-10 DIAGNOSIS — Z79.899 MEDICATION MANAGEMENT: Primary | ICD-10-CM

## 2022-01-10 DIAGNOSIS — F51.04 CHRONIC INSOMNIA: ICD-10-CM

## 2022-01-10 DIAGNOSIS — G43.109 MIGRAINE WITH AURA AND WITHOUT STATUS MIGRAINOSUS, NOT INTRACTABLE: ICD-10-CM

## 2022-01-10 PROCEDURE — G8484 FLU IMMUNIZE NO ADMIN: HCPCS | Performed by: FAMILY MEDICINE

## 2022-01-10 PROCEDURE — 4040F PNEUMOC VAC/ADMIN/RCVD: CPT | Performed by: FAMILY MEDICINE

## 2022-01-10 PROCEDURE — 1123F ACP DISCUSS/DSCN MKR DOCD: CPT | Performed by: FAMILY MEDICINE

## 2022-01-10 PROCEDURE — G8399 PT W/DXA RESULTS DOCUMENT: HCPCS | Performed by: FAMILY MEDICINE

## 2022-01-10 PROCEDURE — 1036F TOBACCO NON-USER: CPT | Performed by: FAMILY MEDICINE

## 2022-01-10 PROCEDURE — 3017F COLORECTAL CA SCREEN DOC REV: CPT | Performed by: FAMILY MEDICINE

## 2022-01-10 PROCEDURE — 1090F PRES/ABSN URINE INCON ASSESS: CPT | Performed by: FAMILY MEDICINE

## 2022-01-10 PROCEDURE — 80305 DRUG TEST PRSMV DIR OPT OBS: CPT | Performed by: FAMILY MEDICINE

## 2022-01-10 PROCEDURE — 99213 OFFICE O/P EST LOW 20 MIN: CPT | Performed by: FAMILY MEDICINE

## 2022-01-10 PROCEDURE — G8427 DOCREV CUR MEDS BY ELIG CLIN: HCPCS | Performed by: FAMILY MEDICINE

## 2022-01-10 PROCEDURE — G8420 CALC BMI NORM PARAMETERS: HCPCS | Performed by: FAMILY MEDICINE

## 2022-01-10 RX ORDER — VALACYCLOVIR HYDROCHLORIDE 1 G/1
TABLET, FILM COATED ORAL
Qty: 30 TABLET | Refills: 2 | Status: SHIPPED | OUTPATIENT
Start: 2022-01-10

## 2022-01-10 RX ORDER — ATORVASTATIN CALCIUM 20 MG/1
20 TABLET, FILM COATED ORAL DAILY
Qty: 30 TABLET | Refills: 5 | Status: SHIPPED | OUTPATIENT
Start: 2022-01-10 | End: 2022-07-18

## 2022-01-10 NOTE — PROGRESS NOTES
SUBJECTIVE:    Eyad S Aden is 80 y. o.female who comes in complaining of Follow-up (6-month-f/u. Medication management. UDS, annette and medication agreement being completed today.  ) and Hyperlipidemia (stopped Crestor due to bad pain in her legs and arms.  )   . HPI: Eyad comes in today for 6-month follow-up of her medication management. She is on Esgic-Plus which she has taken for rescue medication for her migraines for years. She is also on Ambien 5 mg 1 tablet at bedtime. She says if she does not take it she does not sleep at all. She is not abusing these medications. Her headaches are not changing in character. She is also concerned because she stopped the Crestor. It caused severe pain in her thighs and shoulders. Her cholesterol has been very high in the past.  She is willing to try something else. Allergies   Allergen Reactions    Lipitor [Atorvastatin] Diarrhea and Nausea And Vomiting       Social History     Socioeconomic History    Marital status:      Spouse name: Annita Feldman Number of children: 3    Years of education: None    Highest education level: None   Occupational History    Occupation: Teacher   Tobacco Use    Smoking status: Never Smoker    Smokeless tobacco: Never Used   Substance and Sexual Activity    Alcohol use: No    Drug use: No    Sexual activity: Yes     Partners: Male   Other Topics Concern    None   Social History Narrative    None     Social Determinants of Health     Financial Resource Strain:     Difficulty of Paying Living Expenses: Not on file   Food Insecurity:     Worried About Running Out of Food in the Last Year: Not on file    Chris of Food in the Last Year: Not on file   Transportation Needs:     Lack of Transportation (Medical): Not on file    Lack of Transportation (Non-Medical):  Not on file   Physical Activity:     Days of Exercise per Week: Not on file    Minutes of Exercise per Session: Not on file   Stress:     Feeling of Stress : Not on file   Social Connections:     Frequency of Communication with Friends and Family: Not on file    Frequency of Social Gatherings with Friends and Family: Not on file    Attends Scientology Services: Not on file    Active Member of Clubs or Organizations: Not on file    Attends Club or Organization Meetings: Not on file    Marital Status: Not on file   Intimate Partner Violence:     Fear of Current or Ex-Partner: Not on file    Emotionally Abused: Not on file    Physically Abused: Not on file    Sexually Abused: Not on file   Housing Stability:     Unable to Pay for Housing in the Last Year: Not on file    Number of [place] in the Last Year: Not on file    Unstable Housing in the Last Year: Not on file       Review of Systems   Constitutional: Negative. HENT: Negative. Eyes: Negative for visual disturbance. Respiratory: Negative. Cardiovascular: Negative. Neurological: Positive for headaches. Hematological: Bruises/bleeds easily. Psychiatric/Behavioral: Positive for sleep disturbance. The patient is nervous/anxious. Current Outpatient Medications on File Prior to Visit   Medication Sig Dispense Refill    lisinopril (PRINIVIL;ZESTRIL) 20 MG tablet TAKE (1/2) TABLET BY MOUTH TWICE DAILY FOR BLOOD PRESSURE. 90 tablet 3    topiramate ER (TROKENDI XR) 200 MG CP24 Take 200 mg by mouth nightly 30 capsule 5    sucralfate (CARAFATE) 1 GM tablet TAKE 1 TABLET BY MOUTH FOUR TIMES DAILY BEFORE MEALS AND AT BEDTIME 120 tablet 5    omeprazole (PRILOSEC) 40 MG delayed release capsule TAKE 1 CAPSULE BY MOUTH ONCE DAILY 90 capsule 3    Cold Sore Products (LIP BALM) ointment Apply topically as needed.  1 Tube 0    OnabotulinumtoxinA (BOTOX IJ) Inject as directed Indications: Treatment to Prevent Migraine Headaches      VITAMIN D, CHOLECALCIFEROL, PO Take by mouth      Lactobacillus Acidophilus POWD Take by mouth      Multiple Vitamins-Minerals (THERAPEUTIC MULTIVITAMIN-MINERALS) tablet Take 1 tablet by mouth daily      moexipril (UNIVASC) 15 MG tablet Take 15 mg by mouth nightly       No current facility-administered medications on file prior to visit. OBJECTIVE:    Wt Readings from Last 3 Encounters:   01/10/22 142 lb (64.4 kg)   04/07/21 143 lb 12.8 oz (65.2 kg)   06/01/20 140 lb 9.6 oz (63.8 kg)       /74   Pulse 82   Temp 97.7 °F (36.5 °C)   Ht 5' 4\" (1.626 m)   Wt 142 lb (64.4 kg)   SpO2 97%   BMI 24.37 kg/m²     Physical Exam  Vitals and nursing note reviewed. Constitutional:       General: She is not in acute distress. Appearance: Normal appearance. She is well-developed. HENT:      Head: Normocephalic. Right Ear: Tympanic membrane, ear canal and external ear normal.      Left Ear: Tympanic membrane, ear canal and external ear normal.      Nose: Nose normal. No rhinorrhea. Eyes:      Extraocular Movements: Extraocular movements intact. Conjunctiva/sclera: Conjunctivae normal.      Pupils: Pupils are equal, round, and reactive to light. Neck:      Vascular: No carotid bruit. Cardiovascular:      Rate and Rhythm: Normal rate and regular rhythm. Heart sounds: Normal heart sounds. No murmur heard. Pulmonary:      Effort: Pulmonary effort is normal. No respiratory distress. Breath sounds: Normal breath sounds. Musculoskeletal:      Cervical back: Normal range of motion and neck supple. Lymphadenopathy:      Cervical: No cervical adenopathy. Skin:     General: Skin is warm and dry. Neurological:      General: No focal deficit present. Mental Status: She is alert and oriented to person, place, and time. Cranial Nerves: No cranial nerve deficit. Motor: No weakness. Psychiatric:         Mood and Affect: Mood normal.         Speech: Speech normal.         Behavior: Behavior normal.         Thought Content:  Thought content normal.         Judgment: Judgment normal. ASSESSMENT:    1. Medication management    2. Mixed hyperlipidemia    3. Chronic insomnia    4. Migraine with aura and without status migrainosus, not intractable          PLAN:  1. Medication management  -     POCT Rapid Drug Screen  2. Mixed hyperlipidemia  -     atorvastatin (LIPITOR) 20 MG tablet; Take 1 tablet by mouth daily At bedtime for high cholesterol, Disp-30 tablet, R-5Normal  3. Chronic insomnia  -     zolpidem (AMBIEN) 5 MG tablet; Take 1 tablet by mouth nightly as needed for Sleep for up to 90 days. , Disp-30 tablet, R-2Normal  4. Migraine with aura and without status migrainosus, not intractable  -     butalbital-acetaminophen-caffeine (FIORICET, ESGIC) -40 MG per tablet; Take 1 tablet by mouth every 6 hours as needed for Headaches For rescue medication, Disp-45 tablet, R-2Normal     Results for POC orders placed in visit on 01/10/22   POCT Rapid Drug Screen   Result Value Ref Range    Alcohol, Urine n     Amphetamine Screen, Urine n     Barbiturate Screen, Urine n     Benzodiazepine Screen, Urine n     Buprenorphine Urine n     Cocaine Metabolite Screen, Urine n     FENTANYL SCREEN, URINE n     Gabapentin Screen, Urine n     MDMA, Urine n     Methadone Screen, Urine n     Methamphetamine, Urine n     Opiate Scrn, Ur n     Oxycodone Screen, Ur n     PCP Screen, Urine n     Propoxyphene Screen, Urine n     Synthetic Cannabinoids (K2) Screen, Urine n     THC Screen, Urine n     Tramadol Scrn, Ur n     Tricyclic Antidepressants, Urine n      She is not abusing her Ambien or Esgic-Plus. We we will try Lipitor for her cholesterol. If she develops muscle pain she is to let me know. We will draw her fasting labs when she returns in the spring  Follow-up:  Return in about 6 months (around 7/10/2022) for MAW and fasting labs. PATIENT INSTRUCTIONS:  Patient Instructions   We are committed to providing you with the best care possible.    In order to help us achieve these goals please remember to bring all medications, herbal products, and over the counter supplements with you to each visit. If your provider has ordered testing for you, please be sure to follow up with our office if you have not received results within 7 days after the testing took place. *If you receive a survey after visiting one of our offices, please take time to share your experience concerning your physician office visit. These surveys are confidential and no health information about you is shared. We are eager to improve for you and we are counting on your feedback to help make that happen. EMR Dragon/transcription disclaimer:  Much of this encounter note is electronic transcription/translation of spoken language to printed texts. The electronic translation of spoken language may be erroneous, or at times, nonsensical words or phrases may beinadvertently transcribed.   Although I have reviewed the note for such errors, some may still exist.

## 2022-01-10 NOTE — TELEPHONE ENCOUNTER
Caller: Vincent Gray    Relationship: Self    Best call back number: [phone]    What was the call regarding: PT CALLED STATING SHE RECEIVED A LETTER FROM INSURANCE STATING SHE NEEDED TO REQUEST THE OFFICE SUBMIT A FORMULARY EXCEPTION LETTER TO THEM TO CONTINUE COVERING THE COST OF PT'S BOTOX INJECTIONS.    Do you require a callback: YES, PLEASE.    PLEASE REVIEW AND ADVISE.

## 2022-01-11 PROBLEM — G43.711 INTRACTABLE CHRONIC MIGRAINE WITHOUT AURA AND WITH STATUS MIGRAINOSUS: Status: ACTIVE | Noted: 2022-01-11

## 2022-01-11 RX ORDER — BUTALBITAL, ACETAMINOPHEN AND CAFFEINE 50; 325; 40 MG/1; MG/1; MG/1
1 TABLET ORAL EVERY 6 HOURS PRN
Qty: 45 TABLET | Refills: 2 | Status: SHIPPED | OUTPATIENT
Start: 2022-01-11 | End: 2022-04-06

## 2022-01-11 RX ORDER — ZOLPIDEM TARTRATE 5 MG/1
5 TABLET ORAL NIGHTLY PRN
Qty: 30 TABLET | Refills: 2 | Status: SHIPPED | OUTPATIENT
Start: 2022-01-11 | End: 2022-04-11

## 2022-01-11 ASSESSMENT — ENCOUNTER SYMPTOMS: RESPIRATORY NEGATIVE: 1

## 2022-01-20 NOTE — TELEPHONE ENCOUNTER
PT IS CALLING TO CK ON FORMULARY EXCEPTION LETTER TO SEE IF YOU HAVE HEARD ANYTHING?     PLEASE CALL AND ADVISE.

## 2022-01-28 NOTE — TELEPHONE ENCOUNTER
I did call and speak with Vincent about her insurance and we did schedule a Botox appointment for her.

## 2022-02-23 ENCOUNTER — PROCEDURE VISIT (OUTPATIENT)
Dept: NEUROLOGY | Facility: CLINIC | Age: 72
End: 2022-02-23

## 2022-02-23 VITALS — BODY MASS INDEX: 23.9 KG/M2 | WEIGHT: 140 LBS | HEIGHT: 64 IN

## 2022-02-23 DIAGNOSIS — G43.719 INTRACTABLE CHRONIC MIGRAINE WITHOUT AURA AND WITHOUT STATUS MIGRAINOSUS: Primary | ICD-10-CM

## 2022-02-23 PROCEDURE — 64615 CHEMODENERV MUSC MIGRAINE: CPT | Performed by: PSYCHIATRY & NEUROLOGY

## 2022-02-23 RX ORDER — SUCRALFATE 1 G/1
1 TABLET ORAL AS NEEDED
COMMUNITY
Start: 2021-12-22

## 2022-02-23 NOTE — PROGRESS NOTES
Procedure Note:  Vincent Gray  02/23/2022    Procedure:  Botulinum Toxin Injection #5  Indication for Procedure: Chronic Migraines    The risks and benefits were explained to the patient including local infection, mild bleeding, paralysis of muscles, and possible allergic reaction.  The patient expressed understanding and informed consent was obtained.    Initial Headache Frequency: 30/30 Days  Current Headache Frequency: 8/30 Days    Initial Duration (hours): 12/24 Hours  Current Duration (hours): 12/24 Hours     10 Units divided in 2 sites: 5 Units Right, 5 Units Left  Procerus: 5 Units  Frontalis 20 units divided in 4 sites: 10 Units Right, 10 Units Left  Temporalis 40 Units divided in 8 sites: 20 Units Right, 20 Units Left  Occipitalis 30 Units divided in 6 sites: 15 Units Right, 15 Units Left  Cervical Paraspinal 20 Units divided in 4 sites: 10 Units Right, 10 Units Left  Trapezius 30 Units divided in 6 sites: 15 Units Right, 15 Units Left              200 units were mixed in total with 45 units being discarded.    The patient tolerated the procedure well with no complications and no blood loss.    Vincent Gray provided her own Botox for injection.    Follow up for repeat injections in 12 weeks.    Scribed for Horace Alvarez MD by Nahun Zarco MA. 2/22/2022  18:55 CST    I, Dr. Horace Alvarez have reviewed the notes, assessments, and/or procedures performed by me, I concur with her/his documentation of Vincent Gray.

## 2022-03-01 RX ORDER — TOPIRAMATE 200 MG/1
200 CAPSULE, EXTENDED RELEASE ORAL NIGHTLY
Qty: 30 CAPSULE | Refills: 5 | Status: SHIPPED | OUTPATIENT
Start: 2022-03-01 | End: 2022-08-29

## 2022-03-10 RX ORDER — OMEPRAZOLE 40 MG/1
CAPSULE, DELAYED RELEASE ORAL
Qty: 90 CAPSULE | Refills: 0 | Status: SHIPPED | OUTPATIENT
Start: 2022-03-10 | End: 2022-06-01

## 2022-04-06 DIAGNOSIS — G43.109 MIGRAINE WITH AURA AND WITHOUT STATUS MIGRAINOSUS, NOT INTRACTABLE: ICD-10-CM

## 2022-04-06 RX ORDER — BUTALBITAL, ACETAMINOPHEN AND CAFFEINE 50; 325; 40 MG/1; MG/1; MG/1
TABLET ORAL
Qty: 45 TABLET | Refills: 0 | Status: SHIPPED | OUTPATIENT
Start: 2022-04-06 | End: 2022-05-03

## 2022-04-06 RX ORDER — BUTALBITAL, ACETAMINOPHEN AND CAFFEINE 50; 325; 40 MG/1; MG/1; MG/1
TABLET ORAL
Qty: 45 TABLET | Refills: 0 | Status: SHIPPED | OUTPATIENT
Start: 2022-04-06 | End: 2022-04-06 | Stop reason: SDUPTHER

## 2022-04-06 NOTE — TELEPHONE ENCOUNTER
Provider needs to review PDMP    PDMP Monitoring:    Last PDMP Kayla Hernandez as Reviewed [facility]):  Review User Review Instant Review Result   Lucien Carrero 11/23/2021 12:50 PM Reviewed PDMP [1]     Urine Drug Screenings (1 yr)     POCT Rapid Drug Screen  Collected: 1/10/2022 (Final result)    Complete Results          POCT Rapid Drug Screen  Collected: 7/27/2020 12:22 PM (Final result)    Complete Results              Medication Contract and Consent for Opioid Use Documents Filed     Patient Documents     Type of Document Status Date Received Received By Description    Medication Contract Signed 1/23/2018  1:40 PM Babatunde Thomas     Medication Contract Received 8/6/2019  5:19 PM ALBA FREDERICK Medication agreement 7-8-2019    Medication Contract Received 7/28/2020 12:39 PM Guillermo Ramon

## 2022-04-08 LAB
ACETYLCHOLINE BINDING ANTIBODY: 0 NMOL/L (ref 0–0.4)
ACETYLCHOLINE BLOCKING AB: 0 % (ref 0–26)

## 2022-04-11 ENCOUNTER — OFFICE VISIT (OUTPATIENT)
Dept: PRIMARY CARE CLINIC | Age: 72
End: 2022-04-11
Payer: MEDICARE

## 2022-04-11 VITALS
HEIGHT: 64 IN | WEIGHT: 137 LBS | BODY MASS INDEX: 23.39 KG/M2 | DIASTOLIC BLOOD PRESSURE: 70 MMHG | OXYGEN SATURATION: 98 % | HEART RATE: 81 BPM | SYSTOLIC BLOOD PRESSURE: 120 MMHG | TEMPERATURE: 96.8 F

## 2022-04-11 DIAGNOSIS — Z13.220 LIPID SCREENING: ICD-10-CM

## 2022-04-11 DIAGNOSIS — D72.819 LEUKOPENIA, UNSPECIFIED TYPE: ICD-10-CM

## 2022-04-11 DIAGNOSIS — E55.9 VITAMIN D DEFICIENCY: ICD-10-CM

## 2022-04-11 DIAGNOSIS — Z00.00 MEDICARE ANNUAL WELLNESS VISIT, SUBSEQUENT: Primary | ICD-10-CM

## 2022-04-11 DIAGNOSIS — G43.109 MIGRAINE WITH AURA AND WITHOUT STATUS MIGRAINOSUS, NOT INTRACTABLE: ICD-10-CM

## 2022-04-11 DIAGNOSIS — F51.04 CHRONIC INSOMNIA: ICD-10-CM

## 2022-04-11 DIAGNOSIS — I10 ESSENTIAL HYPERTENSION: ICD-10-CM

## 2022-04-11 LAB
ALBUMIN SERPL-MCNC: 4.5 G/DL (ref 3.5–5.2)
ALP BLD-CCNC: 116 U/L (ref 35–104)
ALT SERPL-CCNC: 67 U/L (ref 5–33)
ANION GAP SERPL CALCULATED.3IONS-SCNC: 11 MMOL/L (ref 7–19)
AST SERPL-CCNC: 41 U/L (ref 5–32)
BILIRUB SERPL-MCNC: <0.2 MG/DL (ref 0.2–1.2)
BUN BLDV-MCNC: 19 MG/DL (ref 8–23)
CALCIUM SERPL-MCNC: 9.4 MG/DL (ref 8.8–10.2)
CHLORIDE BLD-SCNC: 103 MMOL/L (ref 98–111)
CHOLESTEROL, TOTAL: 192 MG/DL (ref 160–199)
CO2: 22 MMOL/L (ref 22–29)
CREAT SERPL-MCNC: 0.8 MG/DL (ref 0.5–0.9)
GFR AFRICAN AMERICAN: >59
GFR NON-AFRICAN AMERICAN: >60
GLUCOSE BLD-MCNC: 101 MG/DL (ref 74–109)
HCT VFR BLD CALC: 45.1 % (ref 37–47)
HDLC SERPL-MCNC: 55 MG/DL (ref 65–121)
HEMOGLOBIN: 14 G/DL (ref 12–16)
LDL CHOLESTEROL CALCULATED: 114 MG/DL
MCH RBC QN AUTO: 29.6 PG (ref 27–31)
MCHC RBC AUTO-ENTMCNC: 31 G/DL (ref 33–37)
MCV RBC AUTO: 95.3 FL (ref 81–99)
PDW BLD-RTO: 12.8 % (ref 11.5–14.5)
PLATELET # BLD: 224 K/UL (ref 130–400)
PMV BLD AUTO: 10.1 FL (ref 9.4–12.3)
POTASSIUM SERPL-SCNC: 4 MMOL/L (ref 3.5–5)
RBC # BLD: 4.73 M/UL (ref 4.2–5.4)
SODIUM BLD-SCNC: 136 MMOL/L (ref 136–145)
TOTAL PROTEIN: 6.7 G/DL (ref 6.6–8.7)
TRIGL SERPL-MCNC: 115 MG/DL (ref 0–149)
VITAMIN D 25-HYDROXY: 42.2 NG/ML
WBC # BLD: 4.5 K/UL (ref 4.8–10.8)

## 2022-04-11 PROCEDURE — G8427 DOCREV CUR MEDS BY ELIG CLIN: HCPCS | Performed by: FAMILY MEDICINE

## 2022-04-11 PROCEDURE — G0439 PPPS, SUBSEQ VISIT: HCPCS | Performed by: FAMILY MEDICINE

## 2022-04-11 PROCEDURE — 4040F PNEUMOC VAC/ADMIN/RCVD: CPT | Performed by: FAMILY MEDICINE

## 2022-04-11 PROCEDURE — 3017F COLORECTAL CA SCREEN DOC REV: CPT | Performed by: FAMILY MEDICINE

## 2022-04-11 PROCEDURE — 1036F TOBACCO NON-USER: CPT | Performed by: FAMILY MEDICINE

## 2022-04-11 PROCEDURE — G8399 PT W/DXA RESULTS DOCUMENT: HCPCS | Performed by: FAMILY MEDICINE

## 2022-04-11 PROCEDURE — 1123F ACP DISCUSS/DSCN MKR DOCD: CPT | Performed by: FAMILY MEDICINE

## 2022-04-11 PROCEDURE — 1090F PRES/ABSN URINE INCON ASSESS: CPT | Performed by: FAMILY MEDICINE

## 2022-04-11 PROCEDURE — G8420 CALC BMI NORM PARAMETERS: HCPCS | Performed by: FAMILY MEDICINE

## 2022-04-11 PROCEDURE — 99214 OFFICE O/P EST MOD 30 MIN: CPT | Performed by: FAMILY MEDICINE

## 2022-04-11 RX ORDER — ONABOTULINUMTOXINA 200 [USP'U]/1
INJECTION, POWDER, LYOPHILIZED, FOR SOLUTION INTRADERMAL; INTRAMUSCULAR
COMMUNITY
Start: 2022-02-07

## 2022-04-11 ASSESSMENT — PATIENT HEALTH QUESTIONNAIRE - PHQ9
2. FEELING DOWN, DEPRESSED OR HOPELESS: 0
SUM OF ALL RESPONSES TO PHQ QUESTIONS 1-9: 0
1. LITTLE INTEREST OR PLEASURE IN DOING THINGS: 0
SUM OF ALL RESPONSES TO PHQ QUESTIONS 1-9: 0
SUM OF ALL RESPONSES TO PHQ9 QUESTIONS 1 & 2: 0

## 2022-04-11 ASSESSMENT — LIFESTYLE VARIABLES: HOW OFTEN DO YOU HAVE A DRINK CONTAINING ALCOHOL: NEVER

## 2022-04-11 NOTE — ASSESSMENT & PLAN NOTE
She does use Ambien 5 mg 1 tablet at night for her chronic insomnia which is stable but not completely controlled. PDMP Monitoring:    Last PDMP Kimberley Samples as Reviewed [facility]):  Review User Review Instant Review Result   Raguel Lombard 4/11/2022 10:09 AM Reviewed PDMP [1]     Last Controlled Substance Monitoring Documentation      Office Visit from 4/11/2022 in [address] [facility]   Periodic Controlled Substance Monitoring Possible medication side effects, risk of tolerance/dependence & alternative treatments discussed., No signs of potential drug abuse or diversion identified. , Assessed functional status., Obtaining appropriate analgesic effect of treatment.  filed at 04/11/2022 1009        Urine Drug Screenings (1 yr)     POCT Rapid Drug Screen  Collected: 1/10/2022 (Final result)    Complete Results          POCT Rapid Drug Screen  Collected: 7/27/2020 12:22 PM (Final result)    Complete Results              Medication Contract and Consent for Opioid Use Documents Filed     Patient Documents     Type of Document Status Date Received Received By Description    Medication Contract Signed 1/23/2018  1:40 PM Daniel Bledsoe     Medication Contract Received 8/6/2019  5:19 PM ALBA FREDERICK Medication agreement 7-8-2019    Medication Contract Received 7/28/2020 12:39 PM Tavon Flanagan

## 2022-04-11 NOTE — PROGRESS NOTES
Medicare Annual Wellness Visit    Eyad BUCHANAN Aden is here for Medicare AWV (Fasting. Patient states labs done recently.  )    Assessment & Plan   Medicare annual wellness visit, subsequent  Vitamin D deficiency  Assessment & Plan:  Uncertain status until we get the results of her blood work    Orders:  -     Vitamin D 25 Hydroxy  Leukopenia, unspecified type  -     CBC  Essential hypertension  Assessment & Plan:  Well-controlled. Continue lisinopril 20 mg 1 tablet once a day. We will refill when needed  Orders:  -     CBC  -     Comprehensive Metabolic Panel  Chronic insomnia  Assessment & Plan:  She does use Ambien 5 mg 1 tablet at night for her chronic insomnia which is stable but not completely controlled. PDMP Monitoring:    Last PDMP Kimberley Samples as Reviewed [facility]):  Review User Review Instant Review Result   Kim Cortesmbard 4/11/2022 10:09 AM Reviewed PDMP [1]     Last Controlled Substance Monitoring Documentation      Office Visit from 4/11/2022 in [address] [facility] [place]   Periodic Controlled Substance Monitoring Possible medication side effects, risk of tolerance/dependence & alternative treatments discussed., No signs of potential drug abuse or diversion identified. , Assessed functional status., Obtaining appropriate analgesic effect of treatment.  filed at 04/11/2022 1009        Urine Drug Screenings (1 yr)     POCT Rapid Drug Screen  Collected: 1/10/2022 (Final result)    Complete Results          POCT Rapid Drug Screen  Collected: 7/27/2020 12:22 PM (Final result)    Complete Results              Medication Contract and Consent for Opioid Use Documents Filed     Patient Documents     Type of Document Status Date Received Received By Description    Medication Contract Signed 1/23/2018  1:40 PM Daniel Bledsoe     Medication Contract Received 8/6/2019  5:19 PM ALBA FREDERICK Medication agreement 7-8-2019    Medication Contract Received 7/28/2020 12:39 PM LANA SANTOS                 Migraine with aura and without status migrainosus, not intractable  Assessment & Plan:  Continue Topamax. She does use her Fioricet. She is not abusing this medication. She will continue to see the specialist for Botox. We did talk about some of the newer migraine prevention medicines but her Medicare will not pay for them. Lipid screening  -     Lipid Panel      Recommendations for Preventive Services Due: see orders and patient instructions/AVS.  Recommended screening schedule for the next 5-10 years is provided to the patient in written form: see Patient Instructions/AVS.     Return in 6 months (on 10/11/2022) for Medication monitoring for Fioricet. Subjective   The following acute and/or chronic problems were also addressed today: She has a long history of migraines. She is not sure if Topamax is helping but she takes it. The Botox has been helping most.  She does use Fioricet as needed. She does see Dr. Suman Restrepo and they are going to do a little lift. They did check her for myasthenia gravis but it was negative. She was complaining of severe left shoulder pain but saw Dr. Oni Taveras and he felt that it was due to an injury and was just musculoskeletal.  Physical therapy is helping    Patient's complete Health Risk Assessment and screening values have been reviewed and are found in Flowsheets. The following problems were reviewed today and where indicated follow up appointments were made and/or referrals ordered.     Positive Risk Factor Screenings with Interventions:               General Health and ACP:  General  In general, how would you say your health is?: Good  In the past 7 days, have you experienced any of the following: New or Increased Pain, New or Increased Fatigue, Loneliness, Social Isolation, Stress or Anger?: (!) Yes  Select all that apply: (!) New or Increased Pain  Do you get the social and emotional support that you need?: Yes  Do you have a Living Will?: Yes    Advance Directives     Power of [facility] Will ACP-Advance Directive ACP-Power of     Not on File Not on File Not on File Not on File      General Health Risk Interventions:  · Pain issues: She has been having with posterior left shoulder pain but is going to physical therapy and it is helping. Hearing/Vision:  Do you or your family notice any trouble with your hearing that hasn't been managed with hearing aids?: No  Do you have difficulty driving, watching TV, or doing any of your daily activities because of your eyesight?: (!) Yes  Have you had an eye exam within the past year?: Yes  No exam data present    Hearing/Vision Interventions:  · Vision concerns:  patient encouraged to make appointment with his/her eye specialist    Safety:  Do you have working smoke detectors?: Yes  Do you have any tripping hazards - loose or unsecured carpets or rugs?: No  Do you have any tripping hazards - clutter in doorways, halls, or stairs?: No  Do you have either shower bars, grab bars, non-slip mats or non-slip surfaces in your shower or bathtub?: Yes  Do all of your stairways have a railing or banister?: (!) No  Do you always fasten your seatbelt when you are in a car?: Yes    Safety Interventions:  · Home safety tips provided           Objective   Vitals:    04/11/22 0924   BP: 120/70   Pulse: 81   Temp: 96.8 °F (36 °C)   SpO2: 98%   Weight: 137 lb (62.1 kg)   Height: 5' 4\" (1.626 m)      Body mass index is 23.52 kg/m².         General Appearance: alert and oriented to person, place and time, well developed and well- nourished, in no acute distress  Skin: warm and dry, no rash or erythema  Head: normocephalic and atraumatic  Eyes: pupils equal, round, and reactive to light, extraocular eye movements intact, conjunctivae normal  ENT: tympanic membrane, external ear and ear canal normal bilaterally, nose without deformity, nasal mucosa and turbinates normal without polyps  Neck: supple and non-tender without mass, no thyromegaly or thyroid nodules, no cervical lymphadenopathy  Pulmonary/Chest: clear to auscultation bilaterally- no wheezes, rales or rhonchi, normal air movement, no respiratory distress  Breasts symmetrical. No skin lesions. Nipples appear normal without discharge. No masses or axillary lymphadenopathy. No tenderness. Cardiovascular: normal rate, regular rhythm, normal S1 and S2, no murmurs, rubs, clicks, or gallops, distal pulses intact, no carotid bruits  Abdomen: soft, non-tender, non-distended, normal bowel sounds, no masses or organomegaly  Pelvic exam was not done in light of her age and lack of symptoms. Extremities: no cyanosis, clubbing or edema  Musculoskeletal: normal range of motion, no joint swelling, deformity or tenderness  Neurologic: reflexes normal and symmetric, no cranial nerve deficit, gait, coordination and speech normal       Allergies   Allergen Reactions    Lipitor [Atorvastatin] Diarrhea and Nausea And Vomiting     Prior to Visit Medications    Medication Sig Taking? Authorizing Provider   butalbital-acetaminophen-caffeine (FIORICET, ESGIC) -40 MG per tablet TAKE 1 TABLET BY MOUTH EVERY 6 HOURS AS NEEDED FOR HEADACHES Yes Radha Fuchs MD   omeprazole (PRILOSEC) 40 MG delayed release capsule TAKE 1 CAPSULE BY MOUTH ONCE DAILY Yes Radha Fuchs MD   topiramate ER (TROKENDI XR) 200 MG CP24 Take 200 mg by mouth nightly Yes Radha Fuchs MD   zolpidem (AMBIEN) 5 MG tablet Take 1 tablet by mouth nightly as needed for Sleep for up to 90 days. Yes Radha Fuchs MD   atorvastatin (LIPITOR) 20 MG tablet Take 1 tablet by mouth daily At bedtime for high cholesterol Yes Radha Fuchs MD   valACYclovir (VALTREX) 1 g tablet TAKE (2) TABLETS BY MOUTH AT ONSET OF FEVER BLISTER. MAY REPEAT ONCE IN 12 HOURS. Yes Radha Fuchs MD   lisinopril (PRINIVIL;ZESTRIL) 20 MG tablet TAKE (1/2) TABLET BY MOUTH TWICE DAILY FOR BLOOD PRESSURE.  Yes Radha Fuchs MD   sucralfate (CARAFATE) 1 GM tablet TAKE 1 TABLET BY MOUTH FOUR TIMES DAILY BEFORE MEALS AND AT BEDTIME Yes [place], APRN - CNP   Cold Sore Products (LIP BALM) ointment Apply topically as needed.  Yes Li Chavis MD   OnabotulinumtoxinA (BOTOX IJ) Inject as directed Indications: Treatment to Prevent Migraine Headaches Yes Historical Provider, MD   Lactobacillus Acidophilus POWD Take by mouth Yes Historical Provider, MD   BOTOX 200 units injection   Historical Provider, MD       CareTeam (Including outside providers/suppliers regularly involved in providing care):   Patient Care Team:  Li Chavis MD as PCP - General (Family Medicine)  Li Chavis MD as PCP - [facility] Empaneled Provider    Reviewed and updated this visit:  Tobacco  Allergies  Meds  Problems  Med Hx  Surg Hx  Soc Hx  Fam Hx

## 2022-04-11 NOTE — ASSESSMENT & PLAN NOTE
Continue Topamax. She does use her Fioricet. She is not abusing this medication. She will continue to see the specialist for Botox. We did talk about some of the newer migraine prevention medicines but her Medicare will not pay for them.

## 2022-04-11 NOTE — PATIENT INSTRUCTIONS
Personalized Preventive Plan for Gilbert Aden - 4/11/2022  Medicare offers a range of preventive health benefits. Some of the tests and screenings are paid in full while other may be subject to a deductible, co-insurance, and/or copay. Some of these benefits include a comprehensive review of your medical history including lifestyle, illnesses that may run in your family, and various assessments and screenings as appropriate. After reviewing your medical record and screening and assessments performed today your provider may have ordered immunizations, labs, imaging, and/or referrals for you. A list of these orders (if applicable) as well as your Preventive Care list are included within your After Visit Summary for your review. Other Preventive Recommendations:    · A preventive eye exam performed by an eye specialist is recommended every 1-2 years to screen for glaucoma; cataracts, macular degeneration, and other eye disorders. · A preventive dental visit is recommended every 6 months. · Try to get at least 150 minutes of exercise per week or 10,000 steps per day on a pedometer . · Order or download the FREE \"Exercise & Physical Activity: Your Everyday Guide\" from [employer]. Call 3-100.115.9775 or search [employer] online. · You need 1771-2793 mg of calcium and 5513-0110 IU of vitamin D per day. It is possible to meet your calcium requirement with diet alone, but a vitamin D supplement is usually necessary to meet this goal.  · When exposed to the sun, use a sunscreen that protects against both UVA and UVB radiation with an SPF of 30 or greater. Reapply every 2 to 3 hours or after sweating, drying off with a towel, or swimming. · Always wear a seat belt when traveling in a car. Always wear a helmet when riding a bicycle or motorcycle.     Keeping Home a [facility]       As we get older, changes in balance, gait, strength, vision, hearing, and cognition make even the most youthful senior more prone to accidents. Falls are one of the leading health risks for older people. This increased risk of falling is related to:   Aging process (eg, decreased muscle strength, slowed reflexes)   Higher incidence of chronic health problems (eg, arthritis, diabetes) that may limit mobility, agility or sensory awareness   Side effects of medicine (eg, dizziness, blurred vision)especially medicines like prescription pain medicines and drugs used to treat mental health conditions   Depending on the brittleness of your bones, the consequences of a fall can be serious and long lasting. Home Life   Research by the Association of Aging [facility]) shows that some home accidents among older adults can be prevented by making simple lifestyle changes and basic modifications and repairs to the home environment. Here are some lifestyle changes that experts recommend:   Have your hearing and vision checked regularly. Be sure to wear prescription glasses that are right for you. Speak to your doctor or pharmacist about the possible side effects of your medicines. A number of medicines can cause dizziness. If you have problems with sleep, talk to your doctor. Limit your intake of alcohol. If necessary, use a cane or walker to help maintain your balance. Wear supportive, rubber-soled shoes, even at home. If you live in a region that gets wintry weather, you may want to put special cleats on your shoes to prevent you from slipping on the snow and ice. Exercise regularly to help maintain muscle tone, agility, and balance. Always hold the banister when going up or down stairs. Also, use  bars when getting in or out of the bath or shower, or using the toilet. To avoid dizziness, get up slowly from a lying down position. Sit up first, dangling your legs for a minute or two before rising to a standing position.    Overall Home Safety Check   According to the Consumer Product Safety Commision's when in use. Make sure kitchen curtains are tied back. Move cords and appliances away from the sink and hot surfaces. If extension cords are needed, install wiring guides so they do not hang over the sink, range, or working areas. Look for coffee pots, kettles and toaster ovens with automatic shut-offs. Keep a mop handy in the kitchen so you can wipe up spills instantly. You should also have a small fire extinguisher. Arrange your kitchen with frequently used items on lower shelves to avoid the need to stand on a stepstool to reach them. Make sure countertops are well-lit to avoid injuries while cutting and preparing food. In the Bathroom    Use a non-slip mat or decals in the tub and shower, since wet, soapy tile or porcelain surfaces are extremely slippery. Make sure bathroom rugs are non-skid or tape them firmly to the floor. Bathtubs should have at least one, preferably two, grab bars, firmly attached to structural supports in the wall. (Do not use built-in soap holders or glass shower doors as grab bars.)    Tub seats fitted with non-slip material on the legs allow you to wash sitting down. For people with limited mobility, bathtub transfer benches allow you to slide safely into the tub. Raised toilet seats and toilet safety rails are helpful for those with knee or hip problems. In the [facility]    Make sure you use a nightlight and that the area around your bed is clear of potential obstacles. Be careful with electric blankets and never go to sleep with a heating pad, which can cause serious burns even if on a low setting. Use fire-resistant mattress covers and pillows, and NEVER smoke in bed. Keep a phone next to the bed that is programmed to dial 911 at the push of a button. If you have a chronic condition, you may want to sign on with an automatic call-in service.  Typically the system includes a small pendant that connects directly to an emergency medical voice-response system. You should also make arrangements to stay in contact with someonefriend, neighbor, family memberon a regular schedule. Fire Prevention   According to the [employer]. (Smoke Alarms for Every) [facility], senior citizens are one of the two highest risk groups for death and serious injuries due to residential fires. When cooking, wear short-sleeved items, never a bulky long-sleeved robe. The [facility]'s Safety Checklist for Older Consumers emphasizes the importance of checking basements, garages, workshops and storage areas for fire hazards, such as volatile liquids, piles of old rags or clothing and overloaded circuits. Never smoke in bed or when lying down on a couch or recliner chair. Small portable electric or kerosene heaters are responsible for many home fires and should be used cautiously if at all. If you do use one, be sure to keep them away from flammable materials. In case of fire, make sure you have a pre-established emergency exit plan. Have a professional check your fireplace and other fuel-burning appliances yearly. [employer]   Baby boomers entering the cummings years will continue to see the development of new products to help older adults live safely and independently in spite of age-related changes. Making Life More Livable  , by [employer] Livingston, lists over 1,000 products for \"living well in the mature years,\" such as bathing and mobility aids, household security devices, ergonomically designed knives and peelers, and faucet valves and knobs for temperature control. Medical supply stores and organizations are good sources of information about products that improve your quality of life and insure your safety.      Last Reviewed: November 2009 Sugar Bates MD   Updated: 3/7/2011     Wt Readings from Last 3 Encounters:   04/11/22 137 lb (62.1 kg)   01/10/22 142 lb (64.4 kg)   04/07/21 143 lb 12.8 oz (65.2 kg)

## 2022-04-12 DIAGNOSIS — R74.8 ELEVATED LIVER ENZYMES: Primary | ICD-10-CM

## 2022-04-26 DIAGNOSIS — F51.04 CHRONIC INSOMNIA: Primary | ICD-10-CM

## 2022-04-26 RX ORDER — ZOLPIDEM TARTRATE 5 MG/1
TABLET ORAL
Qty: 30 TABLET | Refills: 0 | Status: SHIPPED | OUTPATIENT
Start: 2022-04-26 | End: 2022-05-25

## 2022-04-26 NOTE — TELEPHONE ENCOUNTER
PDMP Monitoring:    Last PDMP Portillo Romero as Reviewed [facility]):  Review User Review Instant Review Result   Mauricio Hi 4/11/2022 10:09 AM Reviewed PDMP [1]     Urine Drug Screenings (1 yr)     POCT Rapid Drug Screen  Collected: 1/10/2022 (Final result)    Complete Results          POCT Rapid Drug Screen  Collected: 7/27/2020 12:22 PM (Final result)    Complete Results              Medication Contract and Consent for Opioid Use Documents Filed     Patient Documents     Type of Document Status Date Received Received By Description    Medication Contract Signed 1/23/2018  1:40 PM Evangelist Salinas     Medication Contract Received 8/6/2019  5:19 PM ALBA FREDERICK Medication agreement 7-8-2019    Medication Contract Received 7/28/2020 12:39 PM Deandra Clarke

## 2022-05-03 DIAGNOSIS — G43.109 MIGRAINE WITH AURA AND WITHOUT STATUS MIGRAINOSUS, NOT INTRACTABLE: ICD-10-CM

## 2022-05-03 RX ORDER — BUTALBITAL, ACETAMINOPHEN AND CAFFEINE 50; 325; 40 MG/1; MG/1; MG/1
TABLET ORAL
Qty: 45 TABLET | Refills: 0 | Status: SHIPPED | OUTPATIENT
Start: 2022-05-03 | End: 2022-05-31

## 2022-05-03 NOTE — TELEPHONE ENCOUNTER
PDMP Monitoring:    Last PDMP [place] as Reviewed [facility]):  Review User Review Instant Review Result   Juliana Davis 4/11/2022 10:09 AM Reviewed PDMP [1]     Urine Drug Screenings (1 yr)     POCT Rapid Drug Screen  Collected: 1/10/2022 (Final result)    Complete Results          POCT Rapid Drug Screen  Collected: 7/27/2020 12:22 PM (Final result)    Complete Results              Medication Contract and Consent for Opioid Use Documents Filed     Patient Documents     Type of Document Status Date Received Received By Description    Medication Contract Signed 1/23/2018  1:40 PM Geri Mayer     Medication Contract Received 8/6/2019  5:19 PM ALBA FREDERICK Medication agreement 7-8-2019    Medication Contract Received 7/28/2020 12:39 PM Cassia Richardson

## 2022-05-09 DIAGNOSIS — R74.8 ELEVATED LIVER ENZYMES: ICD-10-CM

## 2022-05-09 LAB
ALBUMIN SERPL-MCNC: 4.6 G/DL (ref 3.5–5.2)
ALP BLD-CCNC: 114 U/L (ref 35–104)
ALT SERPL-CCNC: 32 U/L (ref 5–33)
AST SERPL-CCNC: 25 U/L (ref 5–32)
BILIRUB SERPL-MCNC: 0.3 MG/DL (ref 0.2–1.2)
BILIRUBIN DIRECT: 0.2 MG/DL (ref 0–0.3)
BILIRUBIN, INDIRECT: 0.1 MG/DL (ref 0.1–1)
TOTAL PROTEIN: 6.7 G/DL (ref 6.6–8.7)

## 2022-05-25 ENCOUNTER — TELEPHONE (OUTPATIENT)
Dept: NEUROLOGY | Facility: CLINIC | Age: 72
End: 2022-05-25

## 2022-05-25 DIAGNOSIS — F51.04 CHRONIC INSOMNIA: ICD-10-CM

## 2022-05-25 RX ORDER — ZOLPIDEM TARTRATE 5 MG/1
TABLET ORAL
Qty: 30 TABLET | Refills: 2 | Status: SHIPPED | OUTPATIENT
Start: 2022-05-25 | End: 2022-06-20

## 2022-05-25 NOTE — TELEPHONE ENCOUNTER
PDMP Monitoring:    Last PDMP Ravinjuana Catalan as Reviewed [facility]):  Review User Review Instant Review Result   Marjan Yañez 4/11/2022 10:09 AM Reviewed PDMP [1]     Urine Drug Screenings (1 yr)     POCT Rapid Drug Screen  Collected: 1/10/2022 (Final result)    Complete Results          POCT Rapid Drug Screen  Collected: 7/27/2020 12:22 PM (Final result)    Complete Results              Medication Contract and Consent for Opioid Use Documents Filed     Patient Documents     Type of Document Status Date Received Received By Description    Medication Contract Signed 1/23/2018  1:40 PM Felice Whyte     Medication Contract Received 8/6/2019  5:19 PM ALBA FREDERICK Medication agreement 7-8-2019    Medication Contract Received 7/28/2020 12:39 PM Jaleesa Boyle

## 2022-05-26 ENCOUNTER — PROCEDURE VISIT (OUTPATIENT)
Dept: NEUROLOGY | Facility: CLINIC | Age: 72
End: 2022-05-26

## 2022-05-26 DIAGNOSIS — G43.719 INTRACTABLE CHRONIC MIGRAINE WITHOUT AURA AND WITHOUT STATUS MIGRAINOSUS: Primary | ICD-10-CM

## 2022-05-26 PROCEDURE — 64615 CHEMODENERV MUSC MIGRAINE: CPT | Performed by: PSYCHIATRY & NEUROLOGY

## 2022-05-26 RX ORDER — ATORVASTATIN CALCIUM 20 MG/1
20 TABLET, FILM COATED ORAL DAILY
COMMUNITY
Start: 2022-05-17

## 2022-05-31 DIAGNOSIS — G43.109 MIGRAINE WITH AURA AND WITHOUT STATUS MIGRAINOSUS, NOT INTRACTABLE: ICD-10-CM

## 2022-05-31 RX ORDER — BUTALBITAL, ACETAMINOPHEN AND CAFFEINE 50; 325; 40 MG/1; MG/1; MG/1
TABLET ORAL
Qty: 45 TABLET | Refills: 2 | Status: SHIPPED | OUTPATIENT
Start: 2022-05-31 | End: 2022-08-15

## 2022-05-31 NOTE — TELEPHONE ENCOUNTER
PDMP Monitoring:    Last PDMP Radha Akins as Reviewed [facility]):  Review User Review Instant Review Result   Krysten Cardenas 4/11/2022 10:09 AM Reviewed PDMP [1]     Urine Drug Screenings (1 yr)     POCT Rapid Drug Screen  Collected: 1/10/2022 (Final result)    Complete Results          POCT Rapid Drug Screen  Collected: 7/27/2020 12:22 PM (Final result)    Complete Results              Medication Contract and Consent for Opioid Use Documents Filed     Patient Documents     Type of Document Status Date Received Received By Description    Medication Contract Signed 1/23/2018  1:40 PM Bev Cherry     Medication Contract Received 8/6/2019  5:19 PM ALBA FREDERICK Medication agreement 7-8-2019    Medication Contract Received 7/28/2020 12:39 PM Nichol Regan

## 2022-06-01 RX ORDER — OMEPRAZOLE 40 MG/1
CAPSULE, DELAYED RELEASE ORAL
Qty: 90 CAPSULE | Refills: 1 | Status: SHIPPED | OUTPATIENT
Start: 2022-06-01 | End: 2022-09-14

## 2022-06-10 ENCOUNTER — TELEPHONE (OUTPATIENT)
Dept: NEUROLOGY | Facility: CLINIC | Age: 72
End: 2022-06-10

## 2022-06-10 NOTE — TELEPHONE ENCOUNTER
Caller: INA    Relationship to patient:     Best call back number: [phone]    Chief complaint: WANTS TO SET UP A BOTOX FOR AFTER 7-13-22    Type of visit: BOTOX    Requested date: AFTER 7-13-22

## 2022-06-20 DIAGNOSIS — F51.04 CHRONIC INSOMNIA: ICD-10-CM

## 2022-06-20 RX ORDER — ZOLPIDEM TARTRATE 5 MG/1
TABLET ORAL
Qty: 30 TABLET | Refills: 2 | Status: SHIPPED | OUTPATIENT
Start: 2022-06-20 | End: 2022-07-20

## 2022-06-20 NOTE — TELEPHONE ENCOUNTER
PDMP Monitoring:    Last PDMP Dominic Hicks as Reviewed [facility]):  Review User Review Instant Review Result   Iman Points 4/11/2022 10:09 AM Reviewed PDMP [1]     Urine Drug Screenings (1 yr)     POCT Rapid Drug Screen  Collected: 1/10/2022 (Final result)    Complete Results          POCT Rapid Drug Screen  Collected: 7/27/2020 12:22 PM (Final result)    Complete Results              Medication Contract and Consent for Opioid Use Documents Filed     Patient Documents     Type of Document Status Date Received Received By Description    Medication Contract Signed 1/23/2018  1:40 PM Shola Nicholas     Medication Contract Received 8/6/2019  5:19 PM ALBA FREDERICK Medication agreement 7-8-2019    Medication Contract Received 7/28/2020 12:39 PM Beulah Mcdonough

## 2022-07-18 DIAGNOSIS — E78.2 MIXED HYPERLIPIDEMIA: ICD-10-CM

## 2022-07-18 RX ORDER — ATORVASTATIN CALCIUM 20 MG/1
TABLET, FILM COATED ORAL
Qty: 30 TABLET | Refills: 5 | Status: SHIPPED | OUTPATIENT
Start: 2022-07-18

## 2022-08-10 ENCOUNTER — OFFICE VISIT (OUTPATIENT)
Dept: PRIMARY CARE CLINIC | Age: 72
End: 2022-08-10
Payer: MEDICARE

## 2022-08-10 VITALS
BODY MASS INDEX: 22.89 KG/M2 | DIASTOLIC BLOOD PRESSURE: 70 MMHG | WEIGHT: 137.4 LBS | TEMPERATURE: 98.4 F | RESPIRATION RATE: 18 BRPM | SYSTOLIC BLOOD PRESSURE: 118 MMHG | HEART RATE: 83 BPM | HEIGHT: 65 IN | OXYGEN SATURATION: 97 %

## 2022-08-10 DIAGNOSIS — R07.9 CHEST PAIN, UNSPECIFIED TYPE: Primary | ICD-10-CM

## 2022-08-10 PROCEDURE — 3017F COLORECTAL CA SCREEN DOC REV: CPT | Performed by: FAMILY MEDICINE

## 2022-08-10 PROCEDURE — G8420 CALC BMI NORM PARAMETERS: HCPCS | Performed by: FAMILY MEDICINE

## 2022-08-10 PROCEDURE — 1036F TOBACCO NON-USER: CPT | Performed by: FAMILY MEDICINE

## 2022-08-10 PROCEDURE — 1090F PRES/ABSN URINE INCON ASSESS: CPT | Performed by: FAMILY MEDICINE

## 2022-08-10 PROCEDURE — 99213 OFFICE O/P EST LOW 20 MIN: CPT | Performed by: FAMILY MEDICINE

## 2022-08-10 PROCEDURE — 93000 ELECTROCARDIOGRAM COMPLETE: CPT | Performed by: FAMILY MEDICINE

## 2022-08-10 PROCEDURE — G8399 PT W/DXA RESULTS DOCUMENT: HCPCS | Performed by: FAMILY MEDICINE

## 2022-08-10 PROCEDURE — 1123F ACP DISCUSS/DSCN MKR DOCD: CPT | Performed by: FAMILY MEDICINE

## 2022-08-10 PROCEDURE — G8427 DOCREV CUR MEDS BY ELIG CLIN: HCPCS | Performed by: FAMILY MEDICINE

## 2022-08-10 RX ORDER — HYDROXYZINE 50 MG/1
TABLET, FILM COATED ORAL
COMMUNITY
Start: 2022-08-09 | End: 2022-08-10

## 2022-08-10 RX ORDER — TRAMADOL HYDROCHLORIDE 50 MG/1
TABLET ORAL
COMMUNITY
Start: 2022-06-17 | End: 2022-08-10

## 2022-08-10 RX ORDER — ZOLPIDEM TARTRATE 5 MG/1
TABLET ORAL
COMMUNITY
Start: 2022-07-21 | End: 2022-09-15

## 2022-08-10 RX ORDER — ONDANSETRON 4 MG/1
TABLET, ORALLY DISINTEGRATING ORAL
COMMUNITY
Start: 2022-06-17

## 2022-08-10 SDOH — ECONOMIC STABILITY: FOOD INSECURITY: WITHIN THE PAST 12 MONTHS, THE FOOD YOU BOUGHT JUST DIDN'T LAST AND YOU DIDN'T HAVE MONEY TO GET MORE.: NEVER TRUE

## 2022-08-10 SDOH — ECONOMIC STABILITY: FOOD INSECURITY: WITHIN THE PAST 12 MONTHS, YOU WORRIED THAT YOUR FOOD WOULD RUN OUT BEFORE YOU GOT MONEY TO BUY MORE.: NEVER TRUE

## 2022-08-10 ASSESSMENT — ENCOUNTER SYMPTOMS
BACK PAIN: 0
ABDOMINAL PAIN: 0
VOMITING: 0
EYE DISCHARGE: 0
NAUSEA: 0
COLOR CHANGE: 0
DIARRHEA: 0
COUGH: 0
WHEEZING: 0

## 2022-08-10 ASSESSMENT — SOCIAL DETERMINANTS OF HEALTH (SDOH): HOW HARD IS IT FOR YOU TO PAY FOR THE VERY BASICS LIKE FOOD, HOUSING, MEDICAL CARE, AND HEATING?: NOT HARD AT ALL

## 2022-08-10 NOTE — PROGRESS NOTES
SUBJECTIVE:    Patient ID: Hettie Geneva Baumgarten is a 80 y.o. female. HPI:   Patient is seen today for problems with right-sided chest pain. This is been going on for several months. She states that she most recently had an episode when she was having her eyelid surgery done and she states that she got very anxious on the table and they went to put the needle in and she developed the chest pain at that time. She states they gave her a medication to help with this and she states that it did calm her down and the symptoms went away. She states that she also gets this feeling whenever she is driving in traffic in [place] with her grandkids. She states that it is very situational.  She states that she has not had any shortness of breath or clamminess. She has not had any heart work-up recently. She states that she has never had any heart issues in the past.  She states that she has not had a stress test recently.     Past Medical History:   Diagnosis 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/2014        Vitamin D deficiency       Current Outpatient Medications on File Prior to Visit   Medication Sig Dispense Refill    ondansetron (ZOFRAN-ODT) 4 MG disintegrating tablet       zolpidem (AMBIEN) 5 MG tablet       atorvastatin (LIPITOR) 20 MG tablet TAKE ONE TABLET BY MOUTH AT BEDTIME FOR HIGH CHOLESTEROL 30 tablet 5    omeprazole (PRILOSEC) 40 MG delayed release capsule TAKE 1 CAPSULE BY MOUTH ONCE DAILY 90 capsule 1    butalbital-acetaminophen-caffeine (FIORICET, ESGIC) -40 MG per tablet TAKE 1 TABLET BY MOUTH EVERY 6 HOURS AS NEEDED FOR HEADACHES 45 tablet 2    BOTOX 200 units injection       topiramate ER (TROKENDI XR) 200 MG CP24 Take 200 mg by mouth nightly 30 capsule 5    valACYclovir (VALTREX) 1 g tablet TAKE (2) TABLETS BY MOUTH AT ONSET OF FEVER BLISTER. MAY REPEAT ONCE IN 12 HOURS. 30 tablet 2    lisinopril (PRINIVIL;ZESTRIL) 20 MG tablet TAKE (1/2) TABLET BY MOUTH TWICE DAILY FOR BLOOD PRESSURE. 90 tablet 3    Cold Sore Products (LIP BALM) ointment Apply topically as needed. 1 Tube 0    OnabotulinumtoxinA (BOTOX IJ) Inject as directed Indications: Treatment to Prevent Migraine Headaches      Lactobacillus Acidophilus POWD Take by mouth      sucralfate (CARAFATE) 1 GM tablet TAKE 1 TABLET BY MOUTH FOUR TIMES DAILY BEFORE MEALS AND AT BEDTIME (Patient not taking: Reported on 8/10/2022) 120 tablet 5     No current facility-administered medications on file prior to visit. Allergies   Allergen Reactions    Lipitor [Atorvastatin] Diarrhea and Nausea And Vomiting       Review of Systems   Constitutional:  Negative for activity change, appetite change and fever. HENT:  Negative for congestion and nosebleeds. Eyes:  Negative for discharge. Respiratory:  Negative for cough and wheezing. Cardiovascular:  Positive for chest pain. Negative for leg swelling. Gastrointestinal:  Negative for abdominal pain, diarrhea, nausea and vomiting. Genitourinary:  Negative for difficulty urinating, frequency and urgency. Musculoskeletal:  Negative for back pain and gait problem. Skin:  Negative for color change and rash. Neurological:  Negative for dizziness and headaches. Hematological:  Does not bruise/bleed easily. Psychiatric/Behavioral:  Negative for sleep disturbance and suicidal ideas. OBJECTIVE:    Physical Exam  Vitals reviewed. Constitutional:       General: She is not in acute distress. Appearance: Normal appearance. She is well-developed. She is not diaphoretic. HENT:      Head: Normocephalic and atraumatic. Right Ear: External ear normal.      Left Ear: External ear normal.   Cardiovascular:      Rate and Rhythm: Normal rate and regular rhythm. Pulses: Normal pulses.       Heart sounds: Normal heart sounds. No murmur heard. Pulmonary:      Effort: Pulmonary effort is normal. No respiratory distress. Breath sounds: Normal breath sounds. Musculoskeletal:      Cervical back: Normal range of motion and neck supple. Skin:     General: Skin is warm and dry. Neurological:      General: No focal deficit present. Mental Status: She is alert and oriented to person, place, and time. Mental status is at baseline. Psychiatric:         Mood and Affect: Mood normal.         Behavior: Behavior normal.         Thought Content: Thought content normal.         Judgment: Judgment normal.      /70 (Site: Left Upper Arm, Position: Sitting, Cuff Size: Medium Adult)   Pulse 83   Temp 98.4 °F (36.9 °C) (Temporal)   Resp 18   Ht 5' 5\" (1.651 m)   Wt 137 lb 6.4 oz (62.3 kg)   SpO2 97%   BMI 22.86 kg/m²      ASSESSMENT/PLAN:    1. Chest pain, unspecified type  -     EKG 12 Lead - Clinic Performed  -     CARDIAC STRESS TEST EXERCISE ONLY; Future       EKG showed no acute changes in office today. We are going to get her set up for an exercise stress test.  We will notify her of the results of this. She does not want to do anything for the anxiety at this time but discussed that if her symptoms are worsening and she decides that she would like to she is to let us know. PDMP Monitoring:    Last PDMP Vamshi Catalan as Reviewed [facility]):  Review User Review Instant Review Result   Marjan Yañez 4/11/2022 10:09 AM Reviewed PDMP [1]     Last Controlled Substance Monitoring Documentation      [address] Office Visit from 4/11/2022 in [address] [facility]   Periodic Controlled Substance Monitoring Possible medication side effects, risk of tolerance/dependence & alternative treatments discussed., No signs of potential drug abuse or diversion identified. , Assessed functional status., Obtaining appropriate analgesic effect of treatment.  filed at 04/11/2022 1009          Urine Drug Screenings (1 yr)       POCT Rapid Drug Screen  Collected: 1/10/2022 (Final result)              POCT Rapid Drug Screen  Collected: 7/27/2020 12:22 PM (Final result)                  Medication Contract and Consent for Opioid Use Documents Filed       Patient Documents       Type of Document Status Date Received Received By Description    Medication Contract Signed 1/23/2018  1:40 PM Irina Gonzales     Medication Contract Received 8/6/2019  5:19 PM ALBA FREDERICK Medication agreement 7-8-2019    Medication Contract Received 7/28/2020 12:39 PM LANA SANTOS Dragon/transcription disclaimer:  Much of this encounter note is electronic transcription/translation of spoken language toprinted texts. The electronic translation of spoken language may be erroneous, or at times, nonsensical words or phrases may be inadvertently transcribed.   Although I have reviewed the note for such errors, some may stillexist.

## 2022-08-15 DIAGNOSIS — G43.109 MIGRAINE WITH AURA AND WITHOUT STATUS MIGRAINOSUS, NOT INTRACTABLE: ICD-10-CM

## 2022-08-15 RX ORDER — BUTALBITAL, ACETAMINOPHEN AND CAFFEINE 50; 325; 40 MG/1; MG/1; MG/1
TABLET ORAL
Qty: 45 TABLET | Refills: 2 | Status: SHIPPED | OUTPATIENT
Start: 2022-08-15

## 2022-08-23 ENCOUNTER — HOSPITAL ENCOUNTER (OUTPATIENT)
Dept: NON INVASIVE DIAGNOSTICS | Age: 72
Discharge: HOME | End: 2022-08-23
Payer: MEDICARE

## 2022-08-23 DIAGNOSIS — R07.9 CHEST PAIN, UNSPECIFIED TYPE: ICD-10-CM

## 2022-08-23 DIAGNOSIS — Z12.31 SCREENING MAMMOGRAM FOR BREAST CANCER: Primary | ICD-10-CM

## 2022-08-23 PROCEDURE — 93017 CV STRESS TEST TRACING ONLY: CPT

## 2022-08-24 ENCOUNTER — PROCEDURE VISIT (OUTPATIENT)
Dept: NEUROLOGY | Facility: CLINIC | Age: 72
End: 2022-08-24

## 2022-08-24 DIAGNOSIS — G43.719 INTRACTABLE CHRONIC MIGRAINE WITHOUT AURA AND WITHOUT STATUS MIGRAINOSUS: Primary | ICD-10-CM

## 2022-08-24 PROCEDURE — 64615 CHEMODENERV MUSC MIGRAINE: CPT | Performed by: PSYCHIATRY & NEUROLOGY

## 2022-08-29 RX ORDER — TOPIRAMATE 200 MG/1
CAPSULE, EXTENDED RELEASE ORAL
Qty: 30 CAPSULE | Refills: 5 | Status: SHIPPED | OUTPATIENT
Start: 2022-08-29

## 2022-09-14 RX ORDER — OMEPRAZOLE 40 MG/1
CAPSULE, DELAYED RELEASE ORAL
Qty: 90 CAPSULE | Refills: 0 | Status: SHIPPED | OUTPATIENT
Start: 2022-09-14

## 2022-09-15 DIAGNOSIS — F51.04 CHRONIC INSOMNIA: Primary | ICD-10-CM

## 2022-09-15 RX ORDER — ZOLPIDEM TARTRATE 5 MG/1
TABLET ORAL
Qty: 30 TABLET | Refills: 0 | Status: SHIPPED | OUTPATIENT
Start: 2022-09-15 | End: 2022-10-12

## 2022-09-15 NOTE — TELEPHONE ENCOUNTER
Provider needs to review PDMP    PDMP Monitoring:    Last PDMP Karl Calhoun as Reviewed [facility]):  Review User Review Instant Review Result   Tali Sterling 4/11/2022 10:09 AM Reviewed PDMP [1]     Urine Drug Screenings (1 yr)     POCT Rapid Drug Screen  Collected: 1/10/2022 (Final result)          POCT Rapid Drug Screen  Collected: 7/27/2020 12:22 PM (Final result)              Medication Contract and Consent for Opioid Use Documents Filed     Patient Documents     Type of Document Status Date Received Received By Description    Medication Contract Signed 1/23/2018  1:40 PM Randy Swift     Medication Contract Received 8/6/2019  5:19 PM ALBA FREDERICK Medication agreement 7-8-2019    Medication Contract Received 7/28/2020 12:39 PM Lexus Watts

## 2022-10-12 DIAGNOSIS — F51.04 CHRONIC INSOMNIA: ICD-10-CM

## 2022-10-12 RX ORDER — ZOLPIDEM TARTRATE 5 MG/1
5 TABLET ORAL NIGHTLY PRN
Qty: 30 TABLET | Refills: 2 | Status: SHIPPED | OUTPATIENT
Start: 2022-10-12 | End: 2022-11-11

## 2022-10-18 ENCOUNTER — TELEMEDICINE (OUTPATIENT)
Dept: PRIMARY CARE CLINIC | Age: 72
End: 2022-10-18
Payer: MEDICARE

## 2022-10-18 DIAGNOSIS — I10 ESSENTIAL HYPERTENSION: ICD-10-CM

## 2022-10-18 DIAGNOSIS — D72.819 LEUKOPENIA, UNSPECIFIED TYPE: ICD-10-CM

## 2022-10-18 DIAGNOSIS — G43.109 MIGRAINE WITH AURA AND WITHOUT STATUS MIGRAINOSUS, NOT INTRACTABLE: Primary | ICD-10-CM

## 2022-10-18 DIAGNOSIS — E55.9 VITAMIN D DEFICIENCY: ICD-10-CM

## 2022-10-18 DIAGNOSIS — B37.0 THRUSH: ICD-10-CM

## 2022-10-18 DIAGNOSIS — E78.2 MIXED HYPERLIPIDEMIA: ICD-10-CM

## 2022-10-18 PROCEDURE — 99213 OFFICE O/P EST LOW 20 MIN: CPT | Performed by: FAMILY MEDICINE

## 2022-10-18 PROCEDURE — 1090F PRES/ABSN URINE INCON ASSESS: CPT | Performed by: FAMILY MEDICINE

## 2022-10-18 PROCEDURE — 1123F ACP DISCUSS/DSCN MKR DOCD: CPT | Performed by: FAMILY MEDICINE

## 2022-10-18 PROCEDURE — G8427 DOCREV CUR MEDS BY ELIG CLIN: HCPCS | Performed by: FAMILY MEDICINE

## 2022-10-18 PROCEDURE — 3017F COLORECTAL CA SCREEN DOC REV: CPT | Performed by: FAMILY MEDICINE

## 2022-10-18 PROCEDURE — G8399 PT W/DXA RESULTS DOCUMENT: HCPCS | Performed by: FAMILY MEDICINE

## 2022-10-18 ASSESSMENT — ENCOUNTER SYMPTOMS: RESPIRATORY NEGATIVE: 1

## 2022-10-18 NOTE — PROGRESS NOTES
[address]  Phone:  [phone]      10/18/2022     TELEHEALTH EVALUATION -- Audio/Visual (During  public health emergency)    HPI:    Eyad S Aden (:  1950) has requested an audio/video evaluation for the following concern(s):    Eyad called in today just because of her migraines and follow-up. She woke up with a headache this morning. She said she took a butalbital.  If they are not serious she will take Tylenol. She says nothing else really works. She has been on this for years. We have tried multiple other medications and she is even gone to neurologist in [place] but nothing else really works. He has been on Botox for headaches, triptans and several other medications. She is also complaining of sores on the corners of her mouth. At first she thought these were fever blisters and then she realized they were probably a yeast infection. She took some of her 's nystatin suspension and it helped. Now she has it on her tongue and would like a refill. Review of Systems   Constitutional: Negative. Respiratory: Negative. Cardiovascular: Negative. Neurological:  Positive for headaches. Hematological: Negative. Psychiatric/Behavioral: Negative. Prior to Visit Medications    Medication Sig Taking? Authorizing Provider   nystatin (MYCOSTATIN) 172279 UNIT/ML suspension 5 mL swish and swallow 4 times a day for  thrush for 10 days Yes Shun Chavarria MD   zolpidem (AMBIEN) 5 MG tablet Take 1 tablet by mouth nightly as needed for Sleep for up to 30 days.  Yes Shun Chavarria MD   omeprazole (PRILOSEC) 40 MG delayed release capsule TAKE 1 CAPSULE BY MOUTH ONCE DAILY Yes Shun Chavarria MD   TROKENDI  MG CP24 TAKE 1 CAPSULE BY MOUTH AT BEDTIME Yes Shun Chavarria MD   butalbital-acetaminophen-caffeine (FIORICET, ESGIC) -40 MG per tablet TAKE 1 TABLET BY MOUTH EVERY 6 HOURS AS NEEDED FOR HEADACHES Yes Shun Chavarria MD ondansetron (ZOFRAN-ODT) 4 MG disintegrating tablet  Yes Historical Provider, MD   atorvastatin (LIPITOR) 20 MG tablet TAKE ONE TABLET BY MOUTH AT BEDTIME FOR HIGH CHOLESTEROL Yes Adeel Minor MD   BOTOX 200 units injection  Yes Historical Provider, MD   valACYclovir (VALTREX) 1 g tablet TAKE (2) TABLETS BY MOUTH AT ONSET OF FEVER BLISTER. MAY REPEAT ONCE IN 12 HOURS. Yes Adeel Minor MD   lisinopril (PRINIVIL;ZESTRIL) 20 MG tablet TAKE (1/2) TABLET BY MOUTH TWICE DAILY FOR BLOOD PRESSURE. Yes dAeel Minor MD   sucralfate (CARAFATE) 1 GM tablet TAKE 1 TABLET BY MOUTH FOUR TIMES DAILY BEFORE MEALS AND AT BEDTIME Yes Estero Oats, APRN - CNP   Cold Sore Products (LIP BALM) ointment Apply topically as needed. Yes Adeel Minor MD   OnabotulinumtoxinA (BOTOX IJ) Inject as directed Indications: Treatment to Prevent Migraine Headaches Yes Historical Provider, MD   Lactobacillus Acidophilus POWD Take by mouth Yes Historical Provider, MD       Social History     Tobacco Use    Smoking status: Never    Smokeless tobacco: Never   Vaping Use    Vaping Use: Never used   Substance Use Topics    Alcohol use: No    Drug use: No        Past Medical History:   Diagnosis 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/2014        Vitamin D deficiency        PHYSICAL EXAMINATION:  Physical Exam  Vitals and nursing note reviewed. Constitutional:       General: She is not in acute distress. Appearance: Normal appearance. She is not ill-appearing. HENT:      Head: Normocephalic. Skin:     General: Skin is warm and dry. Neurological:      Mental Status: She is alert and oriented to person, place, and time. Mental status is at baseline.    Psychiatric:         Mood and Affect: Mood normal.         Behavior: Behavior normal. Thought Content: Thought content normal.         Judgment: Judgment normal.       Due to this being a TeleHealth encounter, evaluation of the following organ systems is limited: Vitals/Constitutional/EENT/Resp/CV/GI//MS/Neuro/Skin/Heme-Lymph-Imm. ASSESSMENT/PLAN:  1. Migraine with aura and without status migrainosus, not intractable  2. Thrush  3. Vitamin D deficiency  -     Vitamin D 25 Hydroxy; Future  4. Essential hypertension  -     CBC; Future  -     Comprehensive Metabolic Panel; Future  5. Leukopenia, unspecified type  -     CBC; Future  6. Mixed hyperlipidemia  -     Lipid Panel; Future     She was here today just for the migraine and thrush. Nystatin was called in for the thrush. She is going to come by and  a sample of Ubrelvy 100 mg 1 tablet at onset of migraine to try. I did refill her Fioricet. She is not abusing this medication although it is her primary go to medication when she has a migraine. The other problems were just put in so we could put in lab orders in the future. Return in about 6 months (around 4/18/2023) for Medicare annual wellness and fasting blood work. An  electronic signature was used to authenticate this note. --Fady Zambrano MD on 10/18/2022 at 11:06 AM        Pursuant to the emergency declaration under the [address] [place] [address], [address] [place] [address] authority and the [employer] Act, this Virtual  Visit was conducted, with patient's consent, to reduce the patient's risk of exposure to COVID-19 and provide continuity of care for an established patient. Services were provided through a video synchronous discussion virtually to substitute for in-person clinic visit.

## 2022-10-25 RX ORDER — LISINOPRIL 20 MG/1
TABLET ORAL
Qty: 90 TABLET | Refills: 1 | Status: SHIPPED | OUTPATIENT
Start: 2022-10-25

## 2022-11-10 DIAGNOSIS — G43.109 MIGRAINE WITH AURA AND WITHOUT STATUS MIGRAINOSUS, NOT INTRACTABLE: ICD-10-CM

## 2022-11-10 RX ORDER — BUTALBITAL, ACETAMINOPHEN AND CAFFEINE 50; 325; 40 MG/1; MG/1; MG/1
TABLET ORAL
Qty: 45 TABLET | Refills: 1 | Status: SHIPPED | OUTPATIENT
Start: 2022-11-10 | End: 2023-01-05

## 2022-11-21 RX ORDER — VALACYCLOVIR HYDROCHLORIDE 1 G/1
TABLET, FILM COATED ORAL
Qty: 30 TABLET | Refills: 0 | Status: SHIPPED | OUTPATIENT
Start: 2022-11-21

## 2022-11-22 RX ORDER — OMEPRAZOLE 40 MG/1
CAPSULE, DELAYED RELEASE ORAL
Qty: 90 CAPSULE | Refills: 1 | Status: SHIPPED | OUTPATIENT
Start: 2022-11-22

## 2022-12-15 ENCOUNTER — PROCEDURE VISIT (OUTPATIENT)
Dept: NEUROLOGY | Facility: CLINIC | Age: 72
End: 2022-12-15

## 2022-12-15 DIAGNOSIS — G43.719 CHRONIC MIGRAINE WITHOUT AURA, INTRACTABLE, WITHOUT STATUS MIGRAINOSUS: Primary | ICD-10-CM

## 2022-12-15 PROCEDURE — 64615 CHEMODENERV MUSC MIGRAINE: CPT | Performed by: PSYCHIATRY & NEUROLOGY

## 2022-12-15 NOTE — PROGRESS NOTES
Procedure Note:  [facility]  12/15/2022    Procedure:  Botulinum Toxin Injection #8  Indication for Procedure: Chronic Migraines    The risks and benefits were explained to the patient including local infection, mild bleeding, paralysis of muscles, and possible allergic reaction.  The patient expressed understanding and informed consent was obtained.    Initial Headache Frequency: 30/30 Days  Current Headache Frequency: 2/30 Days    Initial Duration (hours): 24/24 Hours  Current Duration (hours): 3/24 Hours     10 Units divided in 2 sites: 5 Units Right, 5 Units Left  Procerus: 5 Units  Frontalis 20 units divided in 4 sites: 10 Units Right, 10 Units Left  Temporalis 40 Units divided in 8 sites: 20 Units Right, 20 Units Left  Occipitalis 30 Units divided in 6 sites: 15 Units Right, 15 Units Left  Cervical Paraspinal 20 Units divided in 4 sites: 10 Units Right, 10 Units Left  Trapezius 30 Units divided in 6 sites: 15 Units Right, 15 Units Left              200 units were mixed in total with 45 units being discarded.    The patient tolerated the procedure well with no complications and no blood loss.    [facility] provided her own Botox for injection.    Follow up for repeat injections in 12 weeks.  Horace Alvarez MD

## 2023-01-05 DIAGNOSIS — F51.04 CHRONIC INSOMNIA: Primary | ICD-10-CM

## 2023-01-05 DIAGNOSIS — G43.109 MIGRAINE WITH AURA AND WITHOUT STATUS MIGRAINOSUS, NOT INTRACTABLE: ICD-10-CM

## 2023-01-05 RX ORDER — BUTALBITAL, ACETAMINOPHEN AND CAFFEINE 50; 325; 40 MG/1; MG/1; MG/1
TABLET ORAL
Qty: 45 TABLET | Refills: 2 | Status: SHIPPED | OUTPATIENT
Start: 2023-01-05

## 2023-01-05 RX ORDER — ZOLPIDEM TARTRATE 5 MG/1
TABLET ORAL
Qty: 30 TABLET | Refills: 2 | Status: SHIPPED | OUTPATIENT
Start: 2023-01-05 | End: 2023-02-05

## 2023-01-05 NOTE — TELEPHONE ENCOUNTER
Provider needs to review PDMP    PDMP Monitoring:    Last PDMP Daron Ma as Reviewed [facility]):  Review User Review Instant Review Result   Anne-Marie Shaw 9/15/2022  1:24 PM Reviewed PDMP [1]     Urine Drug Screenings (1 yr)     POCT Rapid Drug Screen  Collected: 1/10/2022 (Final result)          POCT Rapid Drug Screen  Collected: 7/27/2020 12:22 PM (Final result)              Medication Contract and Consent for Opioid Use Documents Filed     Patient Documents     Type of Document Status Date Received Received By Description    Medication Contract Signed 1/23/2018  1:40 PM Destiny Charles     Medication Contract Received 8/6/2019  5:19 PM ALBA FREDERICK Medication agreement 7-8-2019    Medication Contract Received 7/28/2020 12:39 PM Akanksha Noel

## 2023-01-16 RX ORDER — LISINOPRIL 20 MG/1
TABLET ORAL
Qty: 90 TABLET | Refills: 1 | Status: SHIPPED | OUTPATIENT
Start: 2023-01-16

## 2023-01-30 DIAGNOSIS — E78.2 MIXED HYPERLIPIDEMIA: ICD-10-CM

## 2023-01-30 RX ORDER — ATORVASTATIN CALCIUM 20 MG/1
TABLET, FILM COATED ORAL
Qty: 30 TABLET | Refills: 5 | Status: SHIPPED | OUTPATIENT
Start: 2023-01-30

## 2023-02-01 DIAGNOSIS — G43.719 INTRACTABLE CHRONIC MIGRAINE WITHOUT AURA AND WITHOUT STATUS MIGRAINOSUS: ICD-10-CM

## 2023-02-01 RX ORDER — ONABOTULINUMTOXINA 200 [USP'U]/1
INJECTION, POWDER, LYOPHILIZED, FOR SOLUTION INTRADERMAL; INTRAMUSCULAR
Qty: 1 EACH | Refills: 4 | Status: SHIPPED | OUTPATIENT
Start: 2023-02-01

## 2023-02-07 ENCOUNTER — TELEPHONE (OUTPATIENT)
Dept: NEUROLOGY | Facility: CLINIC | Age: 73
End: 2023-02-07
Payer: MEDICARE

## 2023-02-07 NOTE — TELEPHONE ENCOUNTER
Caller: Tootie     Relationship: [facility]     Best call back number: [phone]    COVER MY MED KEY # [id number]    What was the call regarding: NEEDS A P.A FOR BOTOX PLEASE CALL ASAP.     Do you require a callback: YES       PLEASE ADVISE.

## 2023-02-10 ENCOUNTER — TELEPHONE (OUTPATIENT)
Dept: NEUROLOGY | Facility: CLINIC | Age: 73
End: 2023-02-10
Payer: MEDICARE

## 2023-02-10 NOTE — TELEPHONE ENCOUNTER
Caller: Vincent Gray    Relationship: Self    Best call back number: [phone]    What was the call regarding: PT STATES SHE SPOKE W/ [employer] THIS MORNING AND WAS ADVISED THEY HAVE NOT YET RECEIVED THE PRIOR AUTHORIZATION FOR BOTOX. PT IS CALLING TO MAKE SURE THAT OFFICE HAS INITIATED THROUGH INSURANCE.    Do you require a callback: YES, PLEASE CONTACT PT WITH ANY UPDATES THAT ARE AVAILABLE.    PLEASE REVIEW AND ADVISE.

## 2023-02-10 NOTE — TELEPHONE ENCOUNTER
Provider: JAQUELINE  Caller: YARY  Relationship to Patient: N/A  Pharmacy: N/A  Phone Number: [phone]  Reason for Call: YARY WITH [employer] CALLED AND IS NEED OF ADITIONAL QUESTION ANSWERED FOR THE BOTOX.  REF# [id number]    PLEASE REVIEW AND ADVISE.  THANK YOU.

## 2023-02-16 ENCOUNTER — TELEPHONE (OUTPATIENT)
Dept: NEUROLOGY | Facility: CLINIC | Age: 73
End: 2023-02-16
Payer: MEDICARE

## 2023-02-16 NOTE — TELEPHONE ENCOUNTER
----- Message from Deepti Mann LPN sent at 2/10/2023 11:17 AM CST -----  Vincent would like to schedule a Botox appointment.  She said [employer] sent a letter that they won't cover Botox, but they are working with her provider.  If the appointment is on Wednesday, it needs to be after 1:30.

## 2023-02-21 ENCOUNTER — TELEPHONE (OUTPATIENT)
Dept: NEUROLOGY | Facility: CLINIC | Age: 73
End: 2023-02-21
Payer: MEDICARE

## 2023-02-21 NOTE — TELEPHONE ENCOUNTER
I did call to let Vincent know that I have to reschedule her Botox appointment to Thursday at 10:00AM. I did have to leave a message.

## 2023-02-28 ENCOUNTER — PROCEDURE VISIT (OUTPATIENT)
Dept: NEUROLOGY | Facility: CLINIC | Age: 73
End: 2023-02-28
Payer: MEDICARE

## 2023-02-28 DIAGNOSIS — G43.719 CHRONIC MIGRAINE WITHOUT AURA, INTRACTABLE, WITHOUT STATUS MIGRAINOSUS: Primary | ICD-10-CM

## 2023-02-28 PROCEDURE — 64615 CHEMODENERV MUSC MIGRAINE: CPT | Performed by: PSYCHIATRY & NEUROLOGY

## 2023-03-14 DIAGNOSIS — G43.109 MIGRAINE WITH AURA AND WITHOUT STATUS MIGRAINOSUS, NOT INTRACTABLE: ICD-10-CM

## 2023-03-14 RX ORDER — BUTALBITAL, ACETAMINOPHEN AND CAFFEINE 50; 325; 40 MG/1; MG/1; MG/1
TABLET ORAL
Qty: 45 TABLET | Refills: 0 | Status: SHIPPED | OUTPATIENT
Start: 2023-03-14

## 2023-03-14 NOTE — TELEPHONE ENCOUNTER
Provider needs to review PDMP    PDMP Monitoring:    Last PDMP Elma Keith as Reviewed [facility]):  Review User Review Instant Review Result   Dean Leos 9/15/2022  1:24 PM Reviewed PDMP [1]     Urine Drug Screenings (1 yr)     POCT Rapid Drug Screen  Collected: 1/10/2022 (Final result)          POCT Rapid Drug Screen  Collected: 7/27/2020 12:22 PM (Final result)              Medication Contract and Consent for Opioid Use Documents Filed     Patient Documents     Type of Document Status Date Received Received By Description    Medication Contract Signed 1/23/2018  1:40 PM Edil Stephen     Medication Contract Received 8/6/2019  5:19 PM ALBA FREDERICK Medication agreement 7-8-2019    Medication Contract Received 7/28/2020 12:39 PM Martin Willis

## 2023-03-22 RX ORDER — TOPIRAMATE 200 MG/1
CAPSULE, EXTENDED RELEASE ORAL
Qty: 30 CAPSULE | Refills: 5 | Status: SHIPPED | OUTPATIENT
Start: 2023-03-22 | End: 2023-05-17 | Stop reason: DRUGHIGH

## 2023-03-29 DIAGNOSIS — F51.04 CHRONIC INSOMNIA: Primary | ICD-10-CM

## 2023-03-29 RX ORDER — ZOLPIDEM TARTRATE 5 MG/1
TABLET ORAL
Qty: 30 TABLET | Refills: 2 | Status: SHIPPED | OUTPATIENT
Start: 2023-03-29 | End: 2023-06-27

## 2023-04-10 DIAGNOSIS — G43.109 MIGRAINE WITH AURA AND WITHOUT STATUS MIGRAINOSUS, NOT INTRACTABLE: ICD-10-CM

## 2023-04-10 RX ORDER — BUTALBITAL, ACETAMINOPHEN AND CAFFEINE 50; 325; 40 MG/1; MG/1; MG/1
TABLET ORAL
Qty: 45 TABLET | Refills: 2 | Status: SHIPPED | OUTPATIENT
Start: 2023-04-10

## 2023-05-01 ENCOUNTER — TELEPHONE (OUTPATIENT)
Dept: NEUROLOGY | Facility: CLINIC | Age: 73
End: 2023-05-01
Payer: MEDICARE

## 2023-05-01 DIAGNOSIS — I10 ESSENTIAL HYPERTENSION: ICD-10-CM

## 2023-05-01 DIAGNOSIS — E55.9 VITAMIN D DEFICIENCY: ICD-10-CM

## 2023-05-01 DIAGNOSIS — D72.819 LEUKOPENIA, UNSPECIFIED TYPE: ICD-10-CM

## 2023-05-01 DIAGNOSIS — E78.2 MIXED HYPERLIPIDEMIA: ICD-10-CM

## 2023-05-01 LAB
25(OH)D3 SERPL-MCNC: 39.5 NG/ML
ALBUMIN SERPL-MCNC: 4.4 G/DL (ref 3.5–5.2)
ALP SERPL-CCNC: 126 U/L (ref 35–104)
ALT SERPL-CCNC: 51 U/L (ref 5–33)
ANION GAP SERPL CALCULATED.3IONS-SCNC: 10 MMOL/L (ref 7–19)
AST SERPL-CCNC: 31 U/L (ref 5–32)
BILIRUB SERPL-MCNC: 0.3 MG/DL (ref 0.2–1.2)
BUN SERPL-MCNC: 16 MG/DL (ref 8–23)
CALCIUM SERPL-MCNC: 9.6 MG/DL (ref 8.8–10.2)
CHLORIDE SERPL-SCNC: 108 MMOL/L (ref 98–111)
CHOLEST SERPL-MCNC: 170 MG/DL (ref 160–199)
CO2 SERPL-SCNC: 25 MMOL/L (ref 22–29)
CREAT SERPL-MCNC: 0.8 MG/DL (ref 0.5–0.9)
ERYTHROCYTE [DISTWIDTH] IN BLOOD BY AUTOMATED COUNT: 12.3 % (ref 11.5–14.5)
GLUCOSE SERPL-MCNC: 107 MG/DL (ref 74–109)
HCT VFR BLD AUTO: 45.9 % (ref 37–47)
HDLC SERPL-MCNC: 64 MG/DL (ref 65–121)
HGB BLD-MCNC: 14.4 G/DL (ref 12–16)
LDLC SERPL CALC-MCNC: 87 MG/DL
MCH RBC QN AUTO: 29.9 PG (ref 27–31)
MCHC RBC AUTO-ENTMCNC: 31.4 G/DL (ref 33–37)
MCV RBC AUTO: 95.2 FL (ref 81–99)
PLATELET # BLD AUTO: 252 K/UL (ref 130–400)
PMV BLD AUTO: 9.2 FL (ref 9.4–12.3)
POTASSIUM SERPL-SCNC: 4.7 MMOL/L (ref 3.5–5)
PROT SERPL-MCNC: 6.5 G/DL (ref 6.6–8.7)
RBC # BLD AUTO: 4.82 M/UL (ref 4.2–5.4)
SODIUM SERPL-SCNC: 143 MMOL/L (ref 136–145)
TRIGL SERPL-MCNC: 96 MG/DL (ref 0–149)
WBC # BLD AUTO: 5.5 K/UL (ref 4.8–10.8)

## 2023-05-01 NOTE — TELEPHONE ENCOUNTER
Provider: SARAHY PARSONS   Caller: INA LIM  Relationship to Patient: PATIENT   Phone Number: [phone]  Reason for Call: PATIENT CALLED STATES THAT SHE NEVER RECEIVED A PHONE CALL ABOUT HER NEXT BOTOX APPT. PLEASE CALL PATIENT WITH NEXT APPT DATE AND TIME     THANK YOU

## 2023-05-02 ENCOUNTER — OFFICE VISIT (OUTPATIENT)
Dept: PRIMARY CARE CLINIC | Age: 73
End: 2023-05-02
Payer: MEDICARE

## 2023-05-02 VITALS
HEART RATE: 80 BPM | OXYGEN SATURATION: 97 % | BODY MASS INDEX: 23.49 KG/M2 | WEIGHT: 141 LBS | TEMPERATURE: 97.1 F | RESPIRATION RATE: 18 BRPM | DIASTOLIC BLOOD PRESSURE: 76 MMHG | SYSTOLIC BLOOD PRESSURE: 130 MMHG | HEIGHT: 65 IN

## 2023-05-02 DIAGNOSIS — E78.2 MIXED HYPERLIPIDEMIA: ICD-10-CM

## 2023-05-02 DIAGNOSIS — Z79.899 MEDICATION MANAGEMENT: ICD-10-CM

## 2023-05-02 DIAGNOSIS — Z00.00 MEDICARE ANNUAL WELLNESS VISIT, SUBSEQUENT: Primary | ICD-10-CM

## 2023-05-02 DIAGNOSIS — G43.109 MIGRAINE WITH AURA AND WITHOUT STATUS MIGRAINOSUS, NOT INTRACTABLE: ICD-10-CM

## 2023-05-02 DIAGNOSIS — K14.6 BURNING MOUTH SYNDROME: ICD-10-CM

## 2023-05-02 DIAGNOSIS — F51.04 CHRONIC INSOMNIA: ICD-10-CM

## 2023-05-02 DIAGNOSIS — R74.8 ELEVATED LIVER ENZYMES: ICD-10-CM

## 2023-05-02 DIAGNOSIS — Z12.31 ENCOUNTER FOR SCREENING MAMMOGRAM FOR BREAST CANCER: ICD-10-CM

## 2023-05-02 DIAGNOSIS — Z78.0 POSTMENOPAUSAL: ICD-10-CM

## 2023-05-02 DIAGNOSIS — I10 ESSENTIAL HYPERTENSION: ICD-10-CM

## 2023-05-02 LAB
ALCOHOL URINE: NORMAL
AMPHETAMINE SCREEN, URINE: NORMAL
BARBITURATE SCREEN, URINE: NORMAL
BENZODIAZEPINE SCREEN, URINE: POSITIVE
BUPRENORPHINE URINE: NORMAL
COCAINE METABOLITE SCREEN URINE: NORMAL
FENTANYL SCREEN, URINE: NORMAL
GABAPENTIN SCREEN, URINE: NORMAL
MDMA URINE: NORMAL
METHADONE SCREEN, URINE: NORMAL
METHAMPHETAMINE, URINE: NORMAL
OPIATE SCREEN URINE: NORMAL
OXYCODONE SCREEN URINE: NORMAL
PHENCYCLIDINE SCREEN URINE: NORMAL
PROPOXYPHENE SCREEN, URINE: NORMAL
SYNTHETIC CANNABINOIDS(K2) SCREEN, URINE: NORMAL
THC SCREEN, URINE: NORMAL
TRAMADOL SCREEN URINE: NORMAL
TRICYCLIC ANTIDEPRESSANTS, UR: NORMAL

## 2023-05-02 PROCEDURE — 3075F SYST BP GE 130 - 139MM HG: CPT | Performed by: FAMILY MEDICINE

## 2023-05-02 PROCEDURE — 3078F DIAST BP <80 MM HG: CPT | Performed by: FAMILY MEDICINE

## 2023-05-02 PROCEDURE — 3017F COLORECTAL CA SCREEN DOC REV: CPT | Performed by: FAMILY MEDICINE

## 2023-05-02 PROCEDURE — 1090F PRES/ABSN URINE INCON ASSESS: CPT | Performed by: FAMILY MEDICINE

## 2023-05-02 PROCEDURE — G8427 DOCREV CUR MEDS BY ELIG CLIN: HCPCS | Performed by: FAMILY MEDICINE

## 2023-05-02 PROCEDURE — G8399 PT W/DXA RESULTS DOCUMENT: HCPCS | Performed by: FAMILY MEDICINE

## 2023-05-02 PROCEDURE — G0439 PPPS, SUBSEQ VISIT: HCPCS | Performed by: FAMILY MEDICINE

## 2023-05-02 PROCEDURE — 99214 OFFICE O/P EST MOD 30 MIN: CPT | Performed by: FAMILY MEDICINE

## 2023-05-02 PROCEDURE — 1036F TOBACCO NON-USER: CPT | Performed by: FAMILY MEDICINE

## 2023-05-02 PROCEDURE — G8420 CALC BMI NORM PARAMETERS: HCPCS | Performed by: FAMILY MEDICINE

## 2023-05-02 PROCEDURE — 80305 DRUG TEST PRSMV DIR OPT OBS: CPT | Performed by: FAMILY MEDICINE

## 2023-05-02 PROCEDURE — 1123F ACP DISCUSS/DSCN MKR DOCD: CPT | Performed by: FAMILY MEDICINE

## 2023-05-02 RX ORDER — ATORVASTATIN CALCIUM 20 MG/1
TABLET, FILM COATED ORAL
Qty: 90 TABLET | Refills: 3 | Status: SHIPPED | OUTPATIENT
Start: 2023-05-02

## 2023-05-02 RX ORDER — LISINOPRIL 20 MG/1
TABLET ORAL
Qty: 90 TABLET | Refills: 3 | Status: SHIPPED | OUTPATIENT
Start: 2023-05-02

## 2023-05-02 RX ORDER — FLUCONAZOLE 150 MG/1
150 TABLET ORAL DAILY
Qty: 3 TABLET | Refills: 0 | Status: SHIPPED | OUTPATIENT
Start: 2023-05-02 | End: 2023-05-05

## 2023-05-02 SDOH — ECONOMIC STABILITY: INCOME INSECURITY: HOW HARD IS IT FOR YOU TO PAY FOR THE VERY BASICS LIKE FOOD, HOUSING, MEDICAL CARE, AND HEATING?: NOT HARD AT ALL

## 2023-05-02 SDOH — ECONOMIC STABILITY: HOUSING INSECURITY
IN THE LAST 12 MONTHS, WAS THERE A TIME WHEN YOU DID NOT HAVE A STEADY PLACE TO SLEEP OR SLEPT IN A SHELTER (INCLUDING NOW)?: NO

## 2023-05-02 SDOH — ECONOMIC STABILITY: FOOD INSECURITY: WITHIN THE PAST 12 MONTHS, YOU WORRIED THAT YOUR FOOD WOULD RUN OUT BEFORE YOU GOT MONEY TO BUY MORE.: NEVER TRUE

## 2023-05-02 SDOH — ECONOMIC STABILITY: FOOD INSECURITY: WITHIN THE PAST 12 MONTHS, THE FOOD YOU BOUGHT JUST DIDN'T LAST AND YOU DIDN'T HAVE MONEY TO GET MORE.: NEVER TRUE

## 2023-05-02 ASSESSMENT — PATIENT HEALTH QUESTIONNAIRE - PHQ9
10. IF YOU CHECKED OFF ANY PROBLEMS, HOW DIFFICULT HAVE THESE PROBLEMS MADE IT FOR YOU TO DO YOUR WORK, TAKE CARE OF THINGS AT HOME, OR GET ALONG WITH OTHER PEOPLE: 0
SUM OF ALL RESPONSES TO PHQ QUESTIONS 1-9: 0
1. LITTLE INTEREST OR PLEASURE IN DOING THINGS: 0
SUM OF ALL RESPONSES TO PHQ QUESTIONS 1-9: 0
7. TROUBLE CONCENTRATING ON THINGS, SUCH AS READING THE NEWSPAPER OR WATCHING TELEVISION: 0
3. TROUBLE FALLING OR STAYING ASLEEP: 0
8. MOVING OR SPEAKING SO SLOWLY THAT OTHER PEOPLE COULD HAVE NOTICED. OR THE OPPOSITE, BEING SO FIGETY OR RESTLESS THAT YOU HAVE BEEN MOVING AROUND A LOT MORE THAN USUAL: 0
4. FEELING TIRED OR HAVING LITTLE ENERGY: 0
6. FEELING BAD ABOUT YOURSELF - OR THAT YOU ARE A FAILURE OR HAVE LET YOURSELF OR YOUR FAMILY DOWN: 0
9. THOUGHTS THAT YOU WOULD BE BETTER OFF DEAD, OR OF HURTING YOURSELF: 0
2. FEELING DOWN, DEPRESSED OR HOPELESS: 0
SUM OF ALL RESPONSES TO PHQ QUESTIONS 1-9: 0
5. POOR APPETITE OR OVEREATING: 0
SUM OF ALL RESPONSES TO PHQ QUESTIONS 1-9: 0
SUM OF ALL RESPONSES TO PHQ9 QUESTIONS 1 & 2: 0

## 2023-05-02 ASSESSMENT — LIFESTYLE VARIABLES
HOW OFTEN DO YOU HAVE A DRINK CONTAINING ALCOHOL: NEVER
HOW MANY STANDARD DRINKS CONTAINING ALCOHOL DO YOU HAVE ON A TYPICAL DAY: PATIENT DOES NOT DRINK

## 2023-05-02 NOTE — PROGRESS NOTES
Medicare Annual Wellness Visit    Eyad Dominguez is here for Medicare AWV (Mammogram UTD, 8/2022.  ), Medication Management (Doing well with Ambien.  ), and Skin Problem (Inside of mouth for the past 2.5 months that is sore. Patient feels may be a yeast infection.  )    Assessment & Plan   Medicare annual wellness visit, subsequent  Medication management  -     POCT Rapid Drug Screen  Encounter for screening mammogram for breast cancer  -     HENRY DIGITAL SCREEN W OR WO CAD BILATERAL; Future  Burning mouth syndrome  -     Vitamin B12; Future  -     Folate; Future  -     fluconazole (DIFLUCAN) 150 MG tablet; Take 1 tablet by mouth daily for 3 days, Disp-3 tablet, R-0Normal  Postmenopausal  -     DEXA BONE DENSITY AXIAL SKELETON; Future  Migraine with aura and without status migrainosus, not intractable  Chronic insomnia  Essential hypertension  -     lisinopril (PRINIVIL;ZESTRIL) 20 MG tablet; TAKE (1/2) TABLET BY MOUTH TWICE DAILY FOR BLOOD PRESSURE., Disp-90 tablet, R-3Normal  Mixed hyperlipidemia  -     atorvastatin (LIPITOR) 20 MG tablet; TAKE ONE TABLET BY MOUTH AT BEDTIME FOR HIGH CHOLESTEROL, Disp-90 tablet, R-3Normal  Elevated liver enzymes  -     Hepatic Function Panel; Future    Recommendations for Preventive Services Due: see orders and patient instructions/AVS.      Blood pressure is well controlled. We will refill lisinopril 20 mg 1 tablet once a day when needed. Hypercholesterolemia is well controlled. Continue atorvastatin 20 mg 1 daily. I reviewed all of her labs with her. The alkaline phosphatase is slightly elevated and we will check a DEXA because this may be due to postmenopausal bone loss. 1 liver enzyme was borderline which might be due to the Lipitor but she also feels like she does gain weight in her abdomen. She is tolerating the Lipitor well without any muscle pain or problems. We will recheck labs in 3 months. Her chronic insomnia is well controlled on Ambien 5 mg.   She says she

## 2023-05-02 NOTE — PATIENT INSTRUCTIONS
this for you. Preventing falls in the bath  Install grab bars and nonskid mats inside and outside your shower or tub and near the toilet and sinks. Use shower chairs and bath benches. Use a hand-held shower head that will allow you to sit while showering. Get into a tub or shower by putting the weaker leg in first. Get out of a tub or shower with your strong side first.  Repair loose toilet seats and consider installing a raised toilet seat to make getting on and off the toilet easier. Keep your bathroom door unlocked while you are in the shower. Where can you learn more? Go to http://www.calvert.com/ and enter G117 to learn more about \"Preventing Falls: Care Instructions. \"  Current as of: November 9, 2022               Content Version: 13.6  © 1699-0367 [employer]. Care instructions adapted under license by [facility] ([facility]). If you have questions about a medical condition or this instruction, always ask your healthcare professional. [place] [address] any warranty or liability for your use of this information. A Healthy Heart: Care Instructions  Your Care Instructions     Coronary artery disease, also called heart disease, occurs when a substance called plaque builds up in the vessels that supply oxygen-rich blood to your heart muscle. This can narrow the blood vessels and reduce blood flow. A heart attack happens when blood flow is completely blocked. A high-fat diet, smoking, and other factors increase the risk of heart disease. Your doctor has found that you have a chance of having heart disease. You can do lots of things to keep your heart healthy. It may not be easy, but you can change your diet, exercise more, and quit smoking. These steps really work to lower your chance of heart disease. Follow-up care is a key part of your treatment and safety. Be sure to make and go to all appointments, and call your doctor if you are having problems.  It's also a

## 2023-05-04 DIAGNOSIS — K14.6 BURNING MOUTH SYNDROME: ICD-10-CM

## 2023-05-04 LAB
FOLATE SERPL-MCNC: 18.4 NG/ML (ref 4.8–37.3)
VIT B12 SERPL-MCNC: 467 PG/ML (ref 211–946)

## 2023-05-12 RX ORDER — VALACYCLOVIR HYDROCHLORIDE 1 G/1
TABLET, FILM COATED ORAL
Qty: 30 TABLET | Refills: 3 | Status: SHIPPED | OUTPATIENT
Start: 2023-05-12

## 2023-05-17 ENCOUNTER — TELEPHONE (OUTPATIENT)
Dept: PRIMARY CARE CLINIC | Age: 73
End: 2023-05-17

## 2023-05-17 DIAGNOSIS — N39.46 MIXED STRESS AND URGE URINARY INCONTINENCE: Primary | ICD-10-CM

## 2023-05-17 DIAGNOSIS — K14.6 BURNING MOUTH SYNDROME: Primary | ICD-10-CM

## 2023-05-17 RX ORDER — GABAPENTIN 100 MG/1
100 CAPSULE ORAL 3 TIMES DAILY
Qty: 90 CAPSULE | Refills: 0 | Status: SHIPPED | OUTPATIENT
Start: 2023-05-17 | End: 2023-06-16

## 2023-05-17 NOTE — TELEPHONE ENCOUNTER
Patient sees a specialist and will talk to him. Patient would like to try Gabapentin for her mouth and she also would like to try compound mouthwash but she wants it sent to [facility].

## 2023-05-17 NOTE — TELEPHONE ENCOUNTER
Patient called stating that she is still having problems with her mouth burning. She also wants a referral to Dr Oracio Garcia and [address] [place] [address] for her incontinence. She also wants to increase her Trokendi from 200 mg to 250 mg. She uses [facility].

## 2023-05-18 ENCOUNTER — TELEPHONE (OUTPATIENT)
Dept: NEUROLOGY | Facility: CLINIC | Age: 73
End: 2023-05-18
Payer: MEDICARE

## 2023-05-18 NOTE — TELEPHONE ENCOUNTER
Caller: Vincent Gray    Relationship to patient: Self    Best call back number: [phone]    Chief complaint: HEADACHES    Type of visit: BOTOX INJECTION    Requested date: MORNINGS ARE BEST    Additional notes: PATIENT HAS CALLED MULTIPLE TIMES AND HAS NOT HAD ANYONE RETURN HER CALL. PLEASE CALL HER TO SCHEDULE THIS APPT TODAY.

## 2023-05-22 RX ORDER — OMEPRAZOLE 40 MG/1
CAPSULE, DELAYED RELEASE ORAL
Qty: 90 CAPSULE | Refills: 0 | Status: SHIPPED | OUTPATIENT
Start: 2023-05-22

## 2023-06-07 ENCOUNTER — PROCEDURE VISIT (OUTPATIENT)
Dept: NEUROLOGY | Facility: CLINIC | Age: 73
End: 2023-06-07
Payer: MEDICARE

## 2023-06-07 VITALS
DIASTOLIC BLOOD PRESSURE: 84 MMHG | OXYGEN SATURATION: 99 % | HEIGHT: 65 IN | WEIGHT: 142 LBS | HEART RATE: 88 BPM | BODY MASS INDEX: 23.66 KG/M2 | SYSTOLIC BLOOD PRESSURE: 132 MMHG

## 2023-06-07 DIAGNOSIS — G43.019 INTRACTABLE MIGRAINE WITHOUT AURA AND WITHOUT STATUS MIGRAINOSUS: Primary | ICD-10-CM

## 2023-06-07 NOTE — PROGRESS NOTES
Procedure Note:  Vincent EID [place]  06/07/2023    Procedure:  Botulinum Toxin Injection #8  Indication for Procedure: Chronic Migraines    The risks and benefits were explained to the patient including local infection, mild bleeding, paralysis of muscles, and possible allergic reaction.  The patient expressed understanding and informed consent was obtained.    Initial Headache Frequency: 30/30 Days  Current Headache Frequency: 15/30 Days    Initial Duration (hours): 12/24 Hours  Current Duration (hours): 12/24 Hours     10 Units divided in 2 sites: 5 Units Right, 5 Units Left  Procerus: 5 Units  Frontalis 20 units divided in 4 sites: 10 Units Right, 10 Units Left  Temporalis 40 Units divided in 8 sites: 20 Units Right, 20 Units Left  Occipitalis 30 Units divided in 6 sites: 15 Units Right, 15 Units Left  Cervical Paraspinal 20 Units divided in 4 sites: 10 Units Right, 10 Units Left  Trapezius 30 Units divided in 6 sites: 15 Units Right, 15 Units Left              200 units were mixed in total with 45 units being discarded.    The patient tolerated the procedure well with no complications and no blood loss.    Follow up for repeat injections in 12 weeks.    Horace Alvarez MD

## 2023-06-19 DIAGNOSIS — F51.04 CHRONIC INSOMNIA: ICD-10-CM

## 2023-06-19 RX ORDER — ZOLPIDEM TARTRATE 5 MG/1
TABLET ORAL
Qty: 30 TABLET | Refills: 2 | Status: SHIPPED | OUTPATIENT
Start: 2023-06-19 | End: 2023-09-17

## 2023-06-19 NOTE — TELEPHONE ENCOUNTER
PDMP Monitoring:    Last PDMP Estela Mignon as Reviewed [facility]):  Review User Review Instant Review Result   Cresencio Iglesia 5/2/2023  8:28 PM Reviewed PDMP [1]     Urine Drug Screenings (1 yr)     POCT Rapid Drug Screen  Collected: 5/2/2023 (Final result)          POCT Rapid Drug Screen  Collected: 1/10/2022 (Final result)          POCT Rapid Drug Screen  Collected: 7/27/2020 12:22 PM (Final result)              Medication Contract and Consent for Opioid Use Documents Filed     Patient Documents     Type of Document Status Date Received Received By Description    Medication Contract Signed 1/23/2018  1:40 PM Gayl Officer     Medication Contract Received 8/6/2019  5:19 PM ALBA FREDERICK Medication agreement 7-8-2019    Medication Contract Received 7/28/2020 12:39 PM Guerrero Medina

## 2023-06-21 ENCOUNTER — OFFICE VISIT (OUTPATIENT)
Dept: PRIMARY CARE CLINIC | Age: 73
End: 2023-06-21
Payer: MEDICARE

## 2023-06-21 VITALS
HEIGHT: 64 IN | HEART RATE: 84 BPM | WEIGHT: 145.2 LBS | BODY MASS INDEX: 24.79 KG/M2 | TEMPERATURE: 97 F | OXYGEN SATURATION: 99 % | DIASTOLIC BLOOD PRESSURE: 72 MMHG | SYSTOLIC BLOOD PRESSURE: 119 MMHG | RESPIRATION RATE: 18 BRPM

## 2023-06-21 DIAGNOSIS — K14.6 BURNING MOUTH SYNDROME: Primary | ICD-10-CM

## 2023-06-21 PROCEDURE — G8427 DOCREV CUR MEDS BY ELIG CLIN: HCPCS | Performed by: FAMILY MEDICINE

## 2023-06-21 PROCEDURE — 3078F DIAST BP <80 MM HG: CPT | Performed by: FAMILY MEDICINE

## 2023-06-21 PROCEDURE — 99213 OFFICE O/P EST LOW 20 MIN: CPT | Performed by: FAMILY MEDICINE

## 2023-06-21 PROCEDURE — 1036F TOBACCO NON-USER: CPT | Performed by: FAMILY MEDICINE

## 2023-06-21 PROCEDURE — G8420 CALC BMI NORM PARAMETERS: HCPCS | Performed by: FAMILY MEDICINE

## 2023-06-21 PROCEDURE — 1090F PRES/ABSN URINE INCON ASSESS: CPT | Performed by: FAMILY MEDICINE

## 2023-06-21 PROCEDURE — 3017F COLORECTAL CA SCREEN DOC REV: CPT | Performed by: FAMILY MEDICINE

## 2023-06-21 PROCEDURE — 1123F ACP DISCUSS/DSCN MKR DOCD: CPT | Performed by: FAMILY MEDICINE

## 2023-06-21 PROCEDURE — 3074F SYST BP LT 130 MM HG: CPT | Performed by: FAMILY MEDICINE

## 2023-06-21 PROCEDURE — G8399 PT W/DXA RESULTS DOCUMENT: HCPCS | Performed by: FAMILY MEDICINE

## 2023-06-21 RX ORDER — GABAPENTIN 100 MG/1
CAPSULE ORAL
Qty: 120 CAPSULE | Refills: 2 | Status: SHIPPED | OUTPATIENT
Start: 2023-06-21 | End: 2023-09-20

## 2023-06-21 NOTE — PROGRESS NOTES
Mouth wash was called in to [address] [place] [address] but there will be no Lidocaine in it due to every pharmacy is having trouble getting it .  Dr James Mcintosh was notified of this and agreed that is was ok to go ahead to get it without the lidocaine

## 2023-06-25 PROBLEM — N95.2 ATROPHIC VAGINITIS: Status: ACTIVE | Noted: 2023-06-19

## 2023-06-25 PROBLEM — R15.9 INCONTINENCE OF FECES: Status: ACTIVE | Noted: 2023-06-19

## 2023-06-25 ASSESSMENT — ENCOUNTER SYMPTOMS: RESPIRATORY NEGATIVE: 1

## 2023-06-27 DIAGNOSIS — G43.109 MIGRAINE WITH AURA AND WITHOUT STATUS MIGRAINOSUS, NOT INTRACTABLE: ICD-10-CM

## 2023-06-27 RX ORDER — BUTALBITAL, ACETAMINOPHEN AND CAFFEINE 50; 325; 40 MG/1; MG/1; MG/1
TABLET ORAL
Qty: 45 TABLET | Refills: 2 | Status: SHIPPED | OUTPATIENT
Start: 2023-06-27

## 2023-06-28 DIAGNOSIS — R74.8 ELEVATED LIVER ENZYMES: ICD-10-CM

## 2023-06-28 LAB
ALBUMIN SERPL-MCNC: 4.4 G/DL (ref 3.5–5.2)
ALP SERPL-CCNC: 124 U/L (ref 35–104)
ALT SERPL-CCNC: 53 U/L (ref 5–33)
AST SERPL-CCNC: 29 U/L (ref 5–32)
BILIRUB DIRECT SERPL-MCNC: 0.2 MG/DL (ref 0–0.3)
BILIRUB INDIRECT SERPL-MCNC: 0 MG/DL (ref 0.1–1)
BILIRUB SERPL-MCNC: <0.2 MG/DL (ref 0.2–1.2)
PROT SERPL-MCNC: 6.5 G/DL (ref 6.6–8.7)

## 2023-07-18 DIAGNOSIS — B37.0 THRUSH: ICD-10-CM

## 2023-08-14 RX ORDER — SUCRALFATE 1 G/1
TABLET ORAL
Qty: 120 TABLET | Refills: 1 | Status: SHIPPED | OUTPATIENT
Start: 2023-08-14

## 2023-08-21 RX ORDER — OMEPRAZOLE 40 MG/1
CAPSULE, DELAYED RELEASE ORAL
Qty: 90 CAPSULE | Refills: 0 | Status: SHIPPED | OUTPATIENT
Start: 2023-08-21

## 2023-08-22 DIAGNOSIS — B37.0 THRUSH: ICD-10-CM

## 2023-08-24 DIAGNOSIS — Z78.0 POSTMENOPAUSAL: ICD-10-CM

## 2023-09-01 DIAGNOSIS — Z12.31 ENCOUNTER FOR SCREENING MAMMOGRAM FOR BREAST CANCER: ICD-10-CM

## 2023-09-05 ENCOUNTER — TELEPHONE (OUTPATIENT)
Dept: PRIMARY CARE CLINIC | Age: 73
End: 2023-09-05

## 2023-09-05 DIAGNOSIS — F51.04 CHRONIC INSOMNIA: Primary | ICD-10-CM

## 2023-09-05 RX ORDER — SUVOREXANT 10 MG/1
TABLET, FILM COATED ORAL
Qty: 30 TABLET | Refills: 0 | Status: SHIPPED | OUTPATIENT
Start: 2023-09-05 | End: 2023-09-14 | Stop reason: ALTCHOICE

## 2023-09-05 NOTE — TELEPHONE ENCOUNTER
Pt states at last visit she talked about her being on Ambien for years. She has decided she does want to try something else for sleep.

## 2023-09-12 DIAGNOSIS — G43.109 MIGRAINE WITH AURA AND WITHOUT STATUS MIGRAINOSUS, NOT INTRACTABLE: ICD-10-CM

## 2023-09-12 RX ORDER — BUTALBITAL, ACETAMINOPHEN AND CAFFEINE 50; 325; 40 MG/1; MG/1; MG/1
TABLET ORAL
Qty: 45 TABLET | Refills: 0 | Status: SHIPPED | OUTPATIENT
Start: 2023-09-12

## 2023-09-12 NOTE — PROGRESS NOTES
Procedure Note:  Vincent EID [place]  09/15/2023    Procedure:  Botulinum Toxin Injection #  Indication for Procedure: Chronic Migraines    The risks and benefits were explained to the patient including local infection, mild bleeding, paralysis of muscles, and possible allergic reaction.  The patient expressed understanding and informed consent was obtained.    Initial Headache Frequency:   30/30 Days  Current Headache Frequency:  30/30 Days    Initial Duration (hours):   12/24 Hours  Current Duration (hours):   4/24 Hours     10 Units divided in 2 sites: 5 Units Right, 5 Units Left  Procerus: 5 Units  Frontalis 20 units divided in 4 sites: 10 Units Right, 10 Units Left  Temporalis 40 Units divided in 8 sites: 20 Units Right, 20 Units Left  Occipitalis 30 Units divided in 6 sites: 15 Units Right, 15 Units Left  Cervical Paraspinal 20 Units divided in 4 sites: 10 Units Right, 10 Units Left  Trapezius 30 Units divided in 6 sites: 15 Units Right, 15 Units Left              200 units were mixed in total with 45 units being discarded.    The patient tolerated the procedure well with no complications and no blood loss.    Follow up for repeat injections in 12 weeks.    Horace Alvarez MD

## 2023-09-14 ENCOUNTER — TELEPHONE (OUTPATIENT)
Dept: PRIMARY CARE CLINIC | Age: 73
End: 2023-09-14

## 2023-09-14 DIAGNOSIS — F51.04 CHRONIC INSOMNIA: ICD-10-CM

## 2023-09-14 RX ORDER — ZOLPIDEM TARTRATE 5 MG/1
TABLET ORAL
Qty: 30 TABLET | Refills: 2 | Status: SHIPPED | OUTPATIENT
Start: 2023-09-14 | End: 2023-12-14

## 2023-09-14 NOTE — TELEPHONE ENCOUNTER
Pt states she does NOT like the Bellsomra. Takes 2-3 hours to fall asleep. Wants to go back to Ambien. She states she is throwing away the [place] today.

## 2023-09-15 ENCOUNTER — PROCEDURE VISIT (OUTPATIENT)
Dept: NEUROLOGY | Facility: CLINIC | Age: 73
End: 2023-09-15
Payer: MEDICARE

## 2023-09-15 VITALS
OXYGEN SATURATION: 96 % | SYSTOLIC BLOOD PRESSURE: 130 MMHG | HEART RATE: 81 BPM | DIASTOLIC BLOOD PRESSURE: 80 MMHG | WEIGHT: 140 LBS | HEIGHT: 65 IN | BODY MASS INDEX: 23.32 KG/M2

## 2023-09-15 DIAGNOSIS — G43.019 INTRACTABLE MIGRAINE WITHOUT AURA AND WITHOUT STATUS MIGRAINOSUS: Primary | ICD-10-CM

## 2023-09-19 DIAGNOSIS — K14.6 BURNING MOUTH SYNDROME: ICD-10-CM

## 2023-09-19 RX ORDER — GABAPENTIN 100 MG/1
CAPSULE ORAL
Qty: 120 CAPSULE | Refills: 0 | Status: SHIPPED | OUTPATIENT
Start: 2023-09-19 | End: 2023-10-19

## 2023-09-19 NOTE — TELEPHONE ENCOUNTER
Provider needs to review PDMP    PDMP Monitoring:    Last PDMP Glenn Deiters as Reviewed [facility]):  Review User Review Instant Review Result   Saravanan Montemayor 6/25/2023 10:00 PM Reviewed PDMP [1]     Urine Drug Screenings (1 yr)     POCT Rapid Drug Screen  Collected: 5/2/2023 (Final result)          POCT Rapid Drug Screen  Collected: 1/10/2022 (Final result)          POCT Rapid Drug Screen  Collected: 7/27/2020 12:22 PM (Final result)              Medication Contract and Consent for Opioid Use Documents Filed     Patient Documents     Type of Document Status Date Received Received By Description    Medication Contract Signed 1/23/2018  1:40 PM Michelle Oakley     Medication Contract Received 8/6/2019  5:19 PM ALBA FREDERICK Medication agreement 7-8-2019    Medication Contract Received 7/28/2020 12:39 PM Consuelo Brown

## 2023-10-09 NOTE — TELEPHONE ENCOUNTER
LAST OV: 04/07/21    LAST SIL: 03/23/21    LAST UDS: 07/27/2020    NEXT SCHEDULED OV: 04/11/22 Application Tool (Optional): Cry-AC Post-Care Instructions: I reviewed with the patient in detail post-care instructions. Patient is to wear sunprotection, and avoid picking at any of the treated lesions. Pt may apply Vaseline to crusted or scabbing areas. Show Applicator Variable?: Yes Render Note In Bullet Format When Appropriate: No Detail Level: Detailed Consent: The patient's consent was obtained including but not limited to risks of crusting, scabbing, blistering, scarring, darker or lighter pigmentary change, recurrence, incomplete removal and infection. Duration Of Freeze Thaw-Cycle (Seconds): 5 Number Of Freeze-Thaw Cycles: 2 freeze-thaw cycles

## 2023-10-12 DIAGNOSIS — B37.0 THRUSH: ICD-10-CM

## 2023-10-12 DIAGNOSIS — G43.109 MIGRAINE WITH AURA AND WITHOUT STATUS MIGRAINOSUS, NOT INTRACTABLE: ICD-10-CM

## 2023-10-12 RX ORDER — BUTALBITAL, ACETAMINOPHEN AND CAFFEINE 50; 325; 40 MG/1; MG/1; MG/1
TABLET ORAL
Qty: 45 TABLET | Refills: 2 | Status: SHIPPED | OUTPATIENT
Start: 2023-10-12

## 2023-10-12 NOTE — TELEPHONE ENCOUNTER
PDMP Monitoring:    Last PDMP [facility] as Reviewed [facility]):  Review User Review Instant Review Result   Albert Ortiz 9/19/2023  1:06 PM Reviewed PDMP [1]     Urine Drug Screenings (1 yr)       POCT Rapid Drug Screen  Collected: 5/2/2023 (Final result)              POCT Rapid Drug Screen  Collected: 1/10/2022 (Final result)              POCT Rapid Drug Screen  Collected: 7/27/2020 12:22 PM (Final result)                  Medication Contract and Consent for Opioid Use Documents Filed       Patient Documents       Type of Document Status Date Received Received By Description    Medication Contract Signed 1/23/2018  1:40 PM Ivan Viera     Medication Contract Received 8/6/2019  5:19 PM ALBA FREDERICK Medication agreement 7-8-2019    Medication Contract Received 7/28/2020 12:39 PM Shreya Gaytan

## 2023-10-27 ENCOUNTER — TELEPHONE (OUTPATIENT)
Dept: PRIMARY CARE CLINIC | Age: 73
End: 2023-10-27

## 2023-10-27 DIAGNOSIS — R74.8 ELEVATED LIVER ENZYMES: Primary | ICD-10-CM

## 2023-10-27 NOTE — TELEPHONE ENCOUNTER
----- Message from Ryan Ly sent at 10/27/2023 10:13 AM CDT -----  Subject: Message to Provider    QUESTIONS  Information for Provider? patient is wanting to know if needs blood work   done for this visit and also wants to know if this can be a virtual visit   instead of in person. please call the patient and let her know   ---------------------------------------------------------------------------  --------------  [address]  [phone]; OK to leave message on voicemail  ---------------------------------------------------------------------------  --------------  SCRIPT ANSWERS  Relationship to Patient?  Self

## 2023-10-30 DIAGNOSIS — K14.6 BURNING MOUTH SYNDROME: ICD-10-CM

## 2023-10-30 RX ORDER — GABAPENTIN 100 MG/1
CAPSULE ORAL
Qty: 120 CAPSULE | Refills: 2 | Status: SHIPPED | OUTPATIENT
Start: 2023-10-30 | End: 2024-01-30

## 2023-10-30 RX ORDER — TOPIRAMATE 200 MG/1
1 CAPSULE, EXTENDED RELEASE ORAL
Qty: 30 CAPSULE | Refills: 5 | Status: SHIPPED | OUTPATIENT
Start: 2023-10-30

## 2023-10-30 NOTE — TELEPHONE ENCOUNTER
PDMP Monitoring:    Last PDMP Abby Norman as Reviewed [facility]):  Review User Review Instant Review Result   Migdalia Byrne 9/19/2023  1:06 PM Reviewed PDMP [1]     Urine Drug Screenings (1 yr)       POCT Rapid Drug Screen  Collected: 5/2/2023 (Final result)              POCT Rapid Drug Screen  Collected: 1/10/2022 (Final result)              POCT Rapid Drug Screen  Collected: 7/27/2020 12:22 PM (Final result)                  Medication Contract and Consent for Opioid Use Documents Filed       Patient Documents       Type of Document Status Date Received Received By Description    Medication Contract Signed 1/23/2018  1:40 PM William Barth     Medication Contract Received 8/6/2019  5:19 PM ALBA FREDERICK Medication agreement 7-8-2019    Medication Contract Received 7/28/2020 12:39 PM Yessi Lehman

## 2023-11-02 DIAGNOSIS — R74.8 ELEVATED LIVER ENZYMES: ICD-10-CM

## 2023-11-02 LAB
ALBUMIN SERPL-MCNC: 4.6 G/DL (ref 3.5–5.2)
ALP SERPL-CCNC: 125 U/L (ref 35–104)
ALT SERPL-CCNC: 61 U/L (ref 5–33)
ANION GAP SERPL CALCULATED.3IONS-SCNC: 6 MMOL/L (ref 7–19)
AST SERPL-CCNC: 41 U/L (ref 5–32)
BILIRUB SERPL-MCNC: 0.4 MG/DL (ref 0.2–1.2)
BUN SERPL-MCNC: 20 MG/DL (ref 8–23)
CALCIUM SERPL-MCNC: 9.9 MG/DL (ref 8.8–10.2)
CHLORIDE SERPL-SCNC: 105 MMOL/L (ref 98–111)
CO2 SERPL-SCNC: 28 MMOL/L (ref 22–29)
CREAT SERPL-MCNC: 0.7 MG/DL (ref 0.5–0.9)
GLUCOSE SERPL-MCNC: 102 MG/DL (ref 74–109)
POTASSIUM SERPL-SCNC: 5 MMOL/L (ref 3.5–5)
PROT SERPL-MCNC: 6.8 G/DL (ref 6.6–8.7)
SODIUM SERPL-SCNC: 139 MMOL/L (ref 136–145)

## 2023-11-07 ENCOUNTER — TELEMEDICINE (OUTPATIENT)
Dept: PRIMARY CARE CLINIC | Age: 73
End: 2023-11-07
Payer: MEDICARE

## 2023-11-07 DIAGNOSIS — K14.6 BURNING MOUTH SYNDROME: ICD-10-CM

## 2023-11-07 DIAGNOSIS — F51.04 CHRONIC INSOMNIA: Primary | ICD-10-CM

## 2023-11-07 PROCEDURE — 99213 OFFICE O/P EST LOW 20 MIN: CPT | Performed by: FAMILY MEDICINE

## 2023-11-07 PROCEDURE — G8427 DOCREV CUR MEDS BY ELIG CLIN: HCPCS | Performed by: FAMILY MEDICINE

## 2023-11-07 PROCEDURE — G8399 PT W/DXA RESULTS DOCUMENT: HCPCS | Performed by: FAMILY MEDICINE

## 2023-11-07 PROCEDURE — 3017F COLORECTAL CA SCREEN DOC REV: CPT | Performed by: FAMILY MEDICINE

## 2023-11-07 PROCEDURE — 1090F PRES/ABSN URINE INCON ASSESS: CPT | Performed by: FAMILY MEDICINE

## 2023-11-07 PROCEDURE — 1123F ACP DISCUSS/DSCN MKR DOCD: CPT | Performed by: FAMILY MEDICINE

## 2023-11-07 RX ORDER — LANSOPRAZOLE 30 MG/1
CAPSULE, DELAYED RELEASE ORAL
COMMUNITY
Start: 2023-08-22

## 2023-11-07 ASSESSMENT — ENCOUNTER SYMPTOMS
RESPIRATORY NEGATIVE: 1
GASTROINTESTINAL NEGATIVE: 1

## 2023-11-07 NOTE — PROGRESS NOTES
Eyad S Aden, was evaluated through a synchronous (real-time) audio-video encounter. The patient (or guardian if applicable) is aware that this is a billable service, which includes applicable co-pays. This Virtual Visit was conducted with patient's (and/or legal guardian's) consent. Patient identification was verified, and a caregiver was present when appropriate. The patient was located at Home: [address] [facility]  [address] [facility]  Provider was located at [facility] (Appt Dept): [address],  [address] (:  1950) is a Established patient, presenting virtually for evaluation of the following:    Assessment & Plan   Below is the assessment and plan developed based on review of pertinent history, physical exam, labs, studies, and medications. 1. Chronic insomnia  2. Burning mouth syndrome    Return in about 6 months (around 2024) for MAw and fasting labs. Subjective   Eyad needed a 6-month video visit because she is on 2 controlled substances. She is on Ambien 5 for chronic insomnia. It does not make her feel hung over. She is also on low-dose gabapentin 100 mg 3 times daily for burning mouth syndrome. We have tried several mouthwashes's but they did not help. As long as she does not miss any doses the gabapentin helps. She has weaned herself down on her Esgic-Plus. She had some borderline elevated liver enzymes and I was concerned about that. Review of Systems   Constitutional: Negative. Respiratory: Negative. Cardiovascular: Negative. Gastrointestinal: Negative. Musculoskeletal: Negative. Neurological:  Positive for headaches. Hematological: Negative. Psychiatric/Behavioral:  Positive for sleep disturbance. Negative for self-injury and suicidal ideas. Objective   Patient-Reported Vitals  Patient-Reported Weight: 140lbs  Patient-Reported Height: 5ft5       Physical Exam  Vitals and nursing note reviewed.

## 2023-11-13 DIAGNOSIS — B37.0 THRUSH: ICD-10-CM

## 2023-11-13 RX ORDER — OMEPRAZOLE 40 MG/1
CAPSULE, DELAYED RELEASE ORAL
Qty: 90 CAPSULE | Refills: 1 | Status: SHIPPED | OUTPATIENT
Start: 2023-11-13

## 2023-11-29 RX ORDER — TOPIRAMATE 200 MG/1
1 CAPSULE, EXTENDED RELEASE ORAL
Qty: 90 CAPSULE | Refills: 1 | Status: SHIPPED | OUTPATIENT
Start: 2023-11-29

## 2023-12-06 DIAGNOSIS — F51.04 CHRONIC INSOMNIA: ICD-10-CM

## 2023-12-06 RX ORDER — ZOLPIDEM TARTRATE 5 MG/1
TABLET ORAL
Qty: 30 TABLET | Refills: 2 | Status: SHIPPED | OUTPATIENT
Start: 2023-12-06 | End: 2024-01-05

## 2023-12-06 NOTE — TELEPHONE ENCOUNTER
PDMP Monitoring:    Last PDMP Gigi Cowden as Reviewed [facility]):  Review User Review Instant Review Result   Mazin Nuñez 9/19/2023  1:06 PM Reviewed PDMP [1]     Urine Drug Screenings (1 yr)       POCT Rapid Drug Screen  Collected: 5/2/2023 (Final result)              POCT Rapid Drug Screen  Collected: 1/10/2022 (Final result)              POCT Rapid Drug Screen  Collected: 7/27/2020 12:22 PM (Final result)                  Medication Contract and Consent for Opioid Use Documents Filed       Patient Documents       Type of Document Status Date Received Received By Description    Medication Contract Signed 1/23/2018  1:40 PM Giorgio Box     Medication Contract Received 8/6/2019  5:19 PM ALBA FREDERICK Medication agreement 7-8-2019    Medication Contract Received 7/28/2020 12:39 PM Vishnu Matute

## 2023-12-15 ENCOUNTER — PROCEDURE VISIT (OUTPATIENT)
Dept: NEUROLOGY | Facility: CLINIC | Age: 73
End: 2023-12-15
Payer: MEDICARE

## 2023-12-15 VITALS
HEART RATE: 82 BPM | SYSTOLIC BLOOD PRESSURE: 120 MMHG | OXYGEN SATURATION: 94 % | WEIGHT: 140 LBS | HEIGHT: 65 IN | DIASTOLIC BLOOD PRESSURE: 60 MMHG | BODY MASS INDEX: 23.32 KG/M2

## 2023-12-15 DIAGNOSIS — G43.719 INTRACTABLE CHRONIC MIGRAINE WITHOUT AURA AND WITHOUT STATUS MIGRAINOSUS: Primary | ICD-10-CM

## 2024-01-03 DIAGNOSIS — G43.109 MIGRAINE WITH AURA AND WITHOUT STATUS MIGRAINOSUS, NOT INTRACTABLE: ICD-10-CM

## 2024-01-03 RX ORDER — BUTALBITAL, ACETAMINOPHEN AND CAFFEINE 50; 325; 40 MG/1; MG/1; MG/1
TABLET ORAL
Qty: 45 TABLET | Refills: 2 | Status: SHIPPED | OUTPATIENT
Start: 2024-01-03

## 2024-01-03 NOTE — TELEPHONE ENCOUNTER
Provider needs to review PDMP    PDMP Monitoring:    Last PDMP Andre as Reviewed ([place]):  Review User Review Instant Review Result   JAY JIMENES 9/19/2023  1:06 PM Reviewed PDMP [1]     Urine Drug Screenings (1 yr)       POCT Rapid Drug Screen  Collected: 5/2/2023 (Final result)              POCT Rapid Drug Screen  Collected: 1/10/2022 (Final result)              POCT Rapid Drug Screen  Collected: 7/27/2020 12:22 PM (Final result)                  Medication Contract and Consent for Opioid Use Documents Filed       Patient Documents       Type of Document Status Date Received Received By Description    Medication Contract Signed 1/23/2018  1:40 PM MYLENE CONDE     Medication Contract Received 8/6/2019  5:19 PM ALBA FREDERICK Medication agreement 7-8-2019    Medication Contract Received 7/28/2020 12:39 PM LANA SANTOS

## 2024-01-30 DIAGNOSIS — B37.0 THRUSH: ICD-10-CM

## 2024-01-30 DIAGNOSIS — K14.6 BURNING MOUTH SYNDROME: ICD-10-CM

## 2024-01-30 RX ORDER — GABAPENTIN 100 MG/1
CAPSULE ORAL
Qty: 120 CAPSULE | Refills: 2 | Status: SHIPPED | OUTPATIENT
Start: 2024-01-30 | End: 2024-04-30

## 2024-01-30 NOTE — TELEPHONE ENCOUNTER
PDMP Monitoring:    Last PDMP Andre as Reviewed ([place]):  Review User Review Instant Review Result   JAY JIMENES 1/3/2024 12:18 PM Reviewed PDMP [1]     Urine Drug Screenings (1 yr)       POCT Rapid Drug Screen  Collected: 5/2/2023 (Final result)              POCT Rapid Drug Screen  Collected: 1/10/2022 (Final result)              POCT Rapid Drug Screen  Collected: 7/27/2020 12:22 PM (Final result)                  Medication Contract and Consent for Opioid Use Documents Filed       Patient Documents       Type of Document Status Date Received Received By Description    Medication Contract Signed 1/23/2018  1:40 PM MYLENE CONDE     Medication Contract Received 8/6/2019  5:19 PM ALBA FREDERICK Medication agreement 7-8-2019    Medication Contract Received 7/28/2020 12:39 PM LANA SANTOS

## 2024-02-27 DIAGNOSIS — F51.04 CHRONIC INSOMNIA: Primary | ICD-10-CM

## 2024-02-27 RX ORDER — ZOLPIDEM TARTRATE 5 MG/1
TABLET ORAL
Qty: 30 TABLET | Refills: 2 | Status: SHIPPED | OUTPATIENT
Start: 2024-02-27 | End: 2024-05-27

## 2024-02-27 NOTE — TELEPHONE ENCOUNTER
Eyad S Aden called to request a refill on her medication.      Last office visit : 11/7/2023   Next office visit : 5/9/2024     Last UDS:   Amphetamine Screen, Urine   Date Value Ref Range Status   05/02/2023 n  Final     Barbiturate Screen, Urine   Date Value Ref Range Status   05/02/2023 n  Final     Benzodiazepine Screen, Urine   Date Value Ref Range Status   05/02/2023 Positive  Final     Buprenorphine Urine   Date Value Ref Range Status   05/02/2023 n  Final     Cocaine Metabolite Screen, Urine   Date Value Ref Range Status   05/02/2023 n  Final     Gabapentin Screen, Urine   Date Value Ref Range Status   05/02/2023 not tested  Final     MDMA, Urine   Date Value Ref Range Status   05/02/2023 n  Final     Methamphetamine, Urine   Date Value Ref Range Status   05/02/2023 n  Final     Opiate Scrn, Ur   Date Value Ref Range Status   05/02/2023 n  Final     Oxycodone Screen, Ur   Date Value Ref Range Status   05/02/2023 n  Final     PCP Screen, Urine   Date Value Ref Range Status   05/02/2023 n  Final     Propoxyphene Screen, Urine   Date Value Ref Range Status   05/02/2023 not tested  Final     THC Screen, Urine   Date Value Ref Range Status   05/02/2023 n  Final     Tricyclic Antidepressants, Urine   Date Value Ref Range Status   05/02/2023 not tested  Final         Medication Contract: 5/2/23   Last Fill: 12/6/23    Requested Prescriptions     Pending Prescriptions Disp Refills    zolpidem (AMBIEN) 5 MG tablet [Pharmacy Med Name: ZOLPIDEM TARTRATE 5 MG TABLET] 30 tablet 0     Sig: Take 1 tablet by mouth nightly as needed.         Please approve or refuse this medication.   Cynthia Mace LPN

## 2024-03-01 ENCOUNTER — OFFICE VISIT (OUTPATIENT)
Dept: PRIMARY CARE CLINIC | Age: 74
End: 2024-03-01
Payer: MEDICARE

## 2024-03-01 VITALS
DIASTOLIC BLOOD PRESSURE: 64 MMHG | OXYGEN SATURATION: 95 % | SYSTOLIC BLOOD PRESSURE: 122 MMHG | BODY MASS INDEX: 25.1 KG/M2 | HEART RATE: 88 BPM | WEIGHT: 146.2 LBS | TEMPERATURE: 99.7 F

## 2024-03-01 DIAGNOSIS — H93.8X3 EAR CONGESTION, BILATERAL: Primary | ICD-10-CM

## 2024-03-01 DIAGNOSIS — U09.9 POST-COVID CHRONIC COUGH: ICD-10-CM

## 2024-03-01 DIAGNOSIS — R05.3 POST-COVID CHRONIC COUGH: ICD-10-CM

## 2024-03-01 DIAGNOSIS — R05.3 CHRONIC COUGH: ICD-10-CM

## 2024-03-01 PROCEDURE — 3078F DIAST BP <80 MM HG: CPT | Performed by: FAMILY MEDICINE

## 2024-03-01 PROCEDURE — G8484 FLU IMMUNIZE NO ADMIN: HCPCS | Performed by: FAMILY MEDICINE

## 2024-03-01 PROCEDURE — 3017F COLORECTAL CA SCREEN DOC REV: CPT | Performed by: FAMILY MEDICINE

## 2024-03-01 PROCEDURE — G8427 DOCREV CUR MEDS BY ELIG CLIN: HCPCS | Performed by: FAMILY MEDICINE

## 2024-03-01 PROCEDURE — 3074F SYST BP LT 130 MM HG: CPT | Performed by: FAMILY MEDICINE

## 2024-03-01 PROCEDURE — G8399 PT W/DXA RESULTS DOCUMENT: HCPCS | Performed by: FAMILY MEDICINE

## 2024-03-01 PROCEDURE — 1123F ACP DISCUSS/DSCN MKR DOCD: CPT | Performed by: FAMILY MEDICINE

## 2024-03-01 PROCEDURE — 1036F TOBACCO NON-USER: CPT | Performed by: FAMILY MEDICINE

## 2024-03-01 PROCEDURE — 99213 OFFICE O/P EST LOW 20 MIN: CPT | Performed by: FAMILY MEDICINE

## 2024-03-01 PROCEDURE — G8419 CALC BMI OUT NRM PARAM NOF/U: HCPCS | Performed by: FAMILY MEDICINE

## 2024-03-01 PROCEDURE — 1090F PRES/ABSN URINE INCON ASSESS: CPT | Performed by: FAMILY MEDICINE

## 2024-03-01 RX ORDER — FLUTICASONE PROPIONATE 50 MCG
2 SPRAY, SUSPENSION (ML) NASAL DAILY
Qty: 16 G | Refills: 0 | Status: SHIPPED | OUTPATIENT
Start: 2024-03-01

## 2024-03-01 RX ORDER — FEXOFENADINE HCL AND PSEUDOEPHEDRINE HCI 180; 240 MG/1; MG/1
1 TABLET, EXTENDED RELEASE ORAL DAILY
Qty: 30 TABLET | Refills: 0 | Status: SHIPPED | OUTPATIENT
Start: 2024-03-01

## 2024-03-01 RX ORDER — TOPIRAMATE 200 MG/1
CAPSULE, EXTENDED RELEASE ORAL
COMMUNITY
Start: 2024-01-30 | End: 2024-03-01

## 2024-03-01 RX ORDER — BENZONATATE 200 MG/1
200 CAPSULE ORAL 3 TIMES DAILY PRN
Qty: 30 CAPSULE | Refills: 0 | Status: SHIPPED | OUTPATIENT
Start: 2024-03-01 | End: 2024-03-08

## 2024-03-01 ASSESSMENT — ENCOUNTER SYMPTOMS
BLOOD IN STOOL: 0
CHEST TIGHTNESS: 0
WHEEZING: 0
RHINORRHEA: 1
COUGH: 1
SHORTNESS OF BREATH: 0
SINUS PAIN: 1
SINUS PRESSURE: 1
SORE THROAT: 0
TROUBLE SWALLOWING: 0
ABDOMINAL PAIN: 0

## 2024-03-01 ASSESSMENT — PATIENT HEALTH QUESTIONNAIRE - PHQ9
1. LITTLE INTEREST OR PLEASURE IN DOING THINGS: 0
SUM OF ALL RESPONSES TO PHQ QUESTIONS 1-9: 0
SUM OF ALL RESPONSES TO PHQ QUESTIONS 1-9: 0
SUM OF ALL RESPONSES TO PHQ9 QUESTIONS 1 & 2: 0
SUM OF ALL RESPONSES TO PHQ QUESTIONS 1-9: 0
SUM OF ALL RESPONSES TO PHQ QUESTIONS 1-9: 0
2. FEELING DOWN, DEPRESSED OR HOPELESS: 0

## 2024-03-01 NOTE — PROGRESS NOTES
with minimal relief.  Patient denies any fever at this time.  Patient says that her ears both feel very stuffed up.  Patient also notes that she has started to have a lot of eye irritation.  Patient says that this started today primarily in the left eye.  Patient says she has been around a lot of children at [employer] and thinks she may have gotten pinkeye.  Patient has not been using any OTC drops.  Patient says that her eye is draining a clear discharge and denies any purulent discharge            Review of Systems   Constitutional:  Positive for fatigue. Negative for activity change and fever.   HENT:  Positive for congestion, ear pain, rhinorrhea, sinus pressure and sinus pain. Negative for ear discharge, sore throat and trouble swallowing.    Eyes:  Negative for visual disturbance.   Respiratory:  Positive for cough. Negative for chest tightness, shortness of breath and wheezing.    Cardiovascular:  Negative for chest pain.   Gastrointestinal:  Negative for abdominal pain and blood in stool.   Genitourinary:  Negative for difficulty urinating and urgency.   Neurological:  Negative for weakness and headaches.   Psychiatric/Behavioral:  Negative for confusion.           Objective   Physical Exam  Vitals reviewed.   Constitutional:       General: She is not in acute distress.     Appearance: Normal appearance. She is not ill-appearing.   HENT:      Head: Normocephalic and atraumatic.      Right Ear: Tympanic membrane, ear canal and external ear normal.      Left Ear: Tympanic membrane, ear canal and external ear normal.      Ears:      Comments: Congestion noted behind the tympanic membrane bilaterally  Cardiovascular:      Rate and Rhythm: Normal rate and regular rhythm.      Pulses: Normal pulses.      Heart sounds: Normal heart sounds. No murmur heard.  Pulmonary:      Effort: Pulmonary effort is normal. No respiratory distress.      Breath sounds: Normal breath sounds. No wheezing or rhonchi.   Chest:      Chest  Adult Hypothyroidism

## 2024-03-15 ENCOUNTER — PROCEDURE VISIT (OUTPATIENT)
Dept: NEUROLOGY | Facility: CLINIC | Age: 74
End: 2024-03-15
Payer: MEDICARE

## 2024-03-15 DIAGNOSIS — G43.719 INTRACTABLE CHRONIC MIGRAINE WITHOUT AURA AND WITHOUT STATUS MIGRAINOSUS: Primary | ICD-10-CM

## 2024-03-15 RX ORDER — GABAPENTIN 100 MG/1
100 CAPSULE ORAL 4 TIMES DAILY
COMMUNITY
Start: 2024-01-30 | End: 2024-05-01

## 2024-03-22 DIAGNOSIS — B37.0 THRUSH: ICD-10-CM

## 2024-03-25 DIAGNOSIS — G43.109 MIGRAINE WITH AURA AND WITHOUT STATUS MIGRAINOSUS, NOT INTRACTABLE: ICD-10-CM

## 2024-03-25 RX ORDER — BUTALBITAL, ACETAMINOPHEN AND CAFFEINE 50; 325; 40 MG/1; MG/1; MG/1
TABLET ORAL
Qty: 45 TABLET | Refills: 2 | Status: SHIPPED | OUTPATIENT
Start: 2024-03-25

## 2024-03-25 NOTE — TELEPHONE ENCOUNTER
Requested Prescriptions     Pending Prescriptions Disp Refills    butalbital-acetaminophen-caffeine (FIORICET, ESGIC) -40 MG per tablet [Pharmacy Med Name: [id number] -40] 45 tablet 0     Sig: TAKE 1 TABLET BY MOUTH TWICE DAILY IF NEEDED FOR RESCUE MEDICATION FOR HEADACHES

## 2024-04-29 DIAGNOSIS — K14.6 BURNING MOUTH SYNDROME: ICD-10-CM

## 2024-04-29 RX ORDER — OMEPRAZOLE 40 MG/1
CAPSULE, DELAYED RELEASE ORAL
Qty: 90 CAPSULE | Refills: 0 | Status: SHIPPED | OUTPATIENT
Start: 2024-04-29

## 2024-04-29 RX ORDER — GABAPENTIN 100 MG/1
CAPSULE ORAL
Qty: 120 CAPSULE | Refills: 2 | Status: SHIPPED | OUTPATIENT
Start: 2024-04-29 | End: 2024-07-29

## 2024-04-29 NOTE — TELEPHONE ENCOUNTER
Eyad S Aden called to request a refill on her medication.      Last office visit : 3/1/2024   Next office visit : 5/9/2024     Last UDS:   Amphetamine Screen, Urine   Date Value Ref Range Status   05/02/2023 n  Final     Barbiturate Screen, Urine   Date Value Ref Range Status   05/02/2023 n  Final     Benzodiazepine Screen, Urine   Date Value Ref Range Status   05/02/2023 Positive  Final     Buprenorphine Urine   Date Value Ref Range Status   05/02/2023 n  Final     Cocaine Metabolite Screen, Urine   Date Value Ref Range Status   05/02/2023 n  Final     Gabapentin Screen, Urine   Date Value Ref Range Status   05/02/2023 not tested  Final     MDMA, Urine   Date Value Ref Range Status   05/02/2023 n  Final     Methamphetamine, Urine   Date Value Ref Range Status   05/02/2023 n  Final     Opiate Scrn, Ur   Date Value Ref Range Status   05/02/2023 n  Final     Oxycodone Screen, Ur   Date Value Ref Range Status   05/02/2023 n  Final     PCP Screen, Urine   Date Value Ref Range Status   05/02/2023 n  Final     Propoxyphene Screen, Urine   Date Value Ref Range Status   05/02/2023 not tested  Final     THC Screen, Urine   Date Value Ref Range Status   05/02/2023 n  Final     Tricyclic Antidepressants, Urine   Date Value Ref Range Status   05/02/2023 not tested  Final         Medication Contract: 5/2/23   Last Fill: 1/30/24    Requested Prescriptions     Pending Prescriptions Disp Refills    gabapentin (NEURONTIN) 100 MG capsule [Pharmacy Med Name: GABAPENTIN 100 MG CAPSULE] 120 capsule 0     Sig: TAKE (1) CAPSULE BY MOUTH FOUR TIMES DAILY FOR BURNING MOUTH SYNDROME     Signed Prescriptions Disp Refills    omeprazole (PRILOSEC) 40 MG delayed release capsule 90 capsule 0     Sig: TAKE 1 CAPSULE BY MOUTH ONCE DAILY     Authorizing Provider: JAY JIMENES     Ordering User: CYNTHIA OLMOS         Please approve or refuse this medication.   Cynthia Mace LPN

## 2024-05-09 ENCOUNTER — OFFICE VISIT (OUTPATIENT)
Dept: PRIMARY CARE CLINIC | Age: 74
End: 2024-05-09
Payer: MEDICARE

## 2024-05-09 VITALS
HEART RATE: 88 BPM | WEIGHT: 141 LBS | SYSTOLIC BLOOD PRESSURE: 120 MMHG | OXYGEN SATURATION: 99 % | RESPIRATION RATE: 16 BRPM | HEIGHT: 64 IN | BODY MASS INDEX: 24.07 KG/M2 | DIASTOLIC BLOOD PRESSURE: 72 MMHG | TEMPERATURE: 97.4 F

## 2024-05-09 DIAGNOSIS — Z00.00 MEDICARE ANNUAL WELLNESS VISIT, SUBSEQUENT: Primary | ICD-10-CM

## 2024-05-09 DIAGNOSIS — G43.109 MIGRAINE WITH AURA AND WITHOUT STATUS MIGRAINOSUS, NOT INTRACTABLE: ICD-10-CM

## 2024-05-09 DIAGNOSIS — Z79.899 MEDICATION MANAGEMENT: ICD-10-CM

## 2024-05-09 DIAGNOSIS — F51.04 CHRONIC INSOMNIA: ICD-10-CM

## 2024-05-09 DIAGNOSIS — Z12.31 ENCOUNTER FOR SCREENING MAMMOGRAM FOR MALIGNANT NEOPLASM OF BREAST: ICD-10-CM

## 2024-05-09 DIAGNOSIS — I10 ESSENTIAL HYPERTENSION: ICD-10-CM

## 2024-05-09 DIAGNOSIS — E78.2 MIXED HYPERLIPIDEMIA: ICD-10-CM

## 2024-05-09 DIAGNOSIS — E55.9 VITAMIN D DEFICIENCY: ICD-10-CM

## 2024-05-09 DIAGNOSIS — M54.41 ACUTE BILATERAL LOW BACK PAIN WITH RIGHT-SIDED SCIATICA: ICD-10-CM

## 2024-05-09 DIAGNOSIS — K14.6 BURNING MOUTH SYNDROME: ICD-10-CM

## 2024-05-09 DIAGNOSIS — N81.10 FEMALE BLADDER PROLAPSE: ICD-10-CM

## 2024-05-09 DIAGNOSIS — G44.40 REBOUND HEADACHE: ICD-10-CM

## 2024-05-09 PROBLEM — R15.9 INCONTINENCE OF FECES: Status: RESOLVED | Noted: 2023-06-19 | Resolved: 2024-05-09

## 2024-05-09 PROBLEM — D12.6 BENIGN NEOPLASM OF COLON: Status: ACTIVE | Noted: 2023-11-17

## 2024-05-09 LAB
ALCOHOL URINE: NORMAL
AMPHETAMINE SCREEN, URINE: NORMAL
BARBITURATE SCREEN, URINE: POSITIVE
BENZODIAZEPINE SCREEN, URINE: NORMAL
BUPRENORPHINE URINE: NORMAL
COCAINE METABOLITE SCREEN URINE: NORMAL
FENTANYL SCREEN, URINE: NORMAL
GABAPENTIN SCREEN, URINE: NORMAL
MDMA URINE: NORMAL
METHADONE SCREEN, URINE: NORMAL
METHAMPHETAMINE, URINE: NORMAL
OPIATE SCREEN URINE: NORMAL
OXYCODONE SCREEN URINE: NORMAL
PHENCYCLIDINE SCREEN URINE: NORMAL
PROPOXYPHENE SCREEN, URINE: NORMAL
SYNTHETIC CANNABINOIDS(K2) SCREEN, URINE: NORMAL
THC SCREEN, URINE: NORMAL
TRAMADOL SCREEN URINE: NORMAL
TRICYCLIC ANTIDEPRESSANTS, UR: NORMAL

## 2024-05-09 PROCEDURE — G8399 PT W/DXA RESULTS DOCUMENT: HCPCS | Performed by: FAMILY MEDICINE

## 2024-05-09 PROCEDURE — 80305 DRUG TEST PRSMV DIR OPT OBS: CPT | Performed by: FAMILY MEDICINE

## 2024-05-09 PROCEDURE — G0439 PPPS, SUBSEQ VISIT: HCPCS | Performed by: FAMILY MEDICINE

## 2024-05-09 PROCEDURE — 3078F DIAST BP <80 MM HG: CPT | Performed by: FAMILY MEDICINE

## 2024-05-09 PROCEDURE — 1123F ACP DISCUSS/DSCN MKR DOCD: CPT | Performed by: FAMILY MEDICINE

## 2024-05-09 PROCEDURE — 99214 OFFICE O/P EST MOD 30 MIN: CPT | Performed by: FAMILY MEDICINE

## 2024-05-09 PROCEDURE — 1036F TOBACCO NON-USER: CPT | Performed by: FAMILY MEDICINE

## 2024-05-09 PROCEDURE — G8427 DOCREV CUR MEDS BY ELIG CLIN: HCPCS | Performed by: FAMILY MEDICINE

## 2024-05-09 PROCEDURE — G8420 CALC BMI NORM PARAMETERS: HCPCS | Performed by: FAMILY MEDICINE

## 2024-05-09 PROCEDURE — 3074F SYST BP LT 130 MM HG: CPT | Performed by: FAMILY MEDICINE

## 2024-05-09 PROCEDURE — 3017F COLORECTAL CA SCREEN DOC REV: CPT | Performed by: FAMILY MEDICINE

## 2024-05-09 PROCEDURE — 1090F PRES/ABSN URINE INCON ASSESS: CPT | Performed by: FAMILY MEDICINE

## 2024-05-09 RX ORDER — RIMEGEPANT SULFATE 75 MG/75MG
TABLET, ORALLY DISINTEGRATING ORAL
Qty: 45 TABLET | Refills: 5 | Status: SHIPPED | OUTPATIENT
Start: 2024-05-09

## 2024-05-09 RX ORDER — LISINOPRIL 20 MG/1
TABLET ORAL
Qty: 90 TABLET | Refills: 3 | Status: SHIPPED | OUTPATIENT
Start: 2024-05-09

## 2024-05-09 RX ORDER — ZOLPIDEM TARTRATE 5 MG/1
TABLET ORAL
Qty: 30 TABLET | Refills: 2 | Status: SHIPPED | OUTPATIENT
Start: 2024-05-09 | End: 2024-08-07

## 2024-05-09 RX ORDER — ATORVASTATIN CALCIUM 20 MG/1
TABLET, FILM COATED ORAL
Qty: 90 TABLET | Refills: 3 | Status: SHIPPED | OUTPATIENT
Start: 2024-05-09

## 2024-05-09 SDOH — ECONOMIC STABILITY: INCOME INSECURITY: HOW HARD IS IT FOR YOU TO PAY FOR THE VERY BASICS LIKE FOOD, HOUSING, MEDICAL CARE, AND HEATING?: NOT VERY HARD

## 2024-05-09 SDOH — ECONOMIC STABILITY: FOOD INSECURITY: WITHIN THE PAST 12 MONTHS, THE FOOD YOU BOUGHT JUST DIDN'T LAST AND YOU DIDN'T HAVE MONEY TO GET MORE.: NEVER TRUE

## 2024-05-09 SDOH — ECONOMIC STABILITY: FOOD INSECURITY: WITHIN THE PAST 12 MONTHS, YOU WORRIED THAT YOUR FOOD WOULD RUN OUT BEFORE YOU GOT MONEY TO BUY MORE.: NEVER TRUE

## 2024-05-09 ASSESSMENT — LIFESTYLE VARIABLES
HOW MANY STANDARD DRINKS CONTAINING ALCOHOL DO YOU HAVE ON A TYPICAL DAY: PATIENT DOES NOT DRINK
HOW OFTEN DO YOU HAVE A DRINK CONTAINING ALCOHOL: NEVER

## 2024-05-09 ASSESSMENT — PATIENT HEALTH QUESTIONNAIRE - PHQ9
SUM OF ALL RESPONSES TO PHQ QUESTIONS 1-9: 0
2. FEELING DOWN, DEPRESSED OR HOPELESS: NOT AT ALL
SUM OF ALL RESPONSES TO PHQ QUESTIONS 1-9: 0
1. LITTLE INTEREST OR PLEASURE IN DOING THINGS: NOT AT ALL
SUM OF ALL RESPONSES TO PHQ9 QUESTIONS 1 & 2: 0

## 2024-05-09 NOTE — PROGRESS NOTES
SUBJECTIVE:   73 y.o. female for annual routine physical.  She actually came in for a Medicare annual wellness which was done by her Medicare annual wellness nurse but she has multiple problems we follow including hypertension and chronic insomnia.  She has been on Ambien for years and says she cannot sleep without it.  She was recently placed on gabapentin 100 mg 3 times daily for her burning mouth syndrome and it has helped.  She says if she forgets a pill it will come back.    She has had chronic migraines more than 15 migraines a month for years.  Before she came to see me she had been placed on Esgic and she has been totally unable to get off of this.  She is also on Trokendi.  Emgality did not help.  Nurtec helps but she is not sure her insurance would approve it.  She has tried Imitrex.  She also gets Botox injections but nothing really helps.    She is complaining of some urinary urgency and frequency.  She feels like her pelvic floor surgery has relaxed and she would like to go somewhere where they do robotic repair.  Dr. Reeder did it first.  She is willing to go anywhere.    She denies any injury but she is also complaining of pain in her right lower back which occasionally will radiate to the buttock and down to her knee.  It can be severe.  She is already on gabapentin  LMP: No LMP recorded. Patient is postmenopausal.  Patient Active Problem List    Diagnosis Date Noted    Female bladder prolapse 05/09/2024    Benign neoplasm of colon 11/17/2023    Atrophic vaginitis 06/19/2023    Intractable chronic migraine without aura and with status migrainosus 01/11/2022    Migraine with aura and without status migrainosus, not intractable 07/09/2018    Osteoarthritis     Essential hypertension     Chronic insomnia     Colon polyps     Vitamin D deficiency      Current Outpatient Medications   Medication Sig Dispense Refill    rimegepant sulfate (NURTEC) 75 MG TBDP 1 tablet by mouth every other day for migraine

## 2024-05-09 NOTE — PATIENT INSTRUCTIONS
We are committed to providing you with the best care possible.   In order to help us achieve these goals please remember to bring all medications, herbal products, and over the counter supplements with you to each visit.     If your provider has ordered testing for you, please be sure to follow up with our office if you have not received results within 7 days after the testing took place.     *If you receive a survey after visiting one of our offices, please take time to share your experience concerning your physician office visit. These surveys are confidential and no health information about you is shared.  We are eager to improve for you and we are counting on your feedback to help make that happen.      Learning About Dental Care for Older Adults  Dental care for older adults: Overview  Dental care for older people is much the same as for younger adults. But older adults do have concerns that younger adults do not. Older adults may have problems with gum disease and decay on the roots of their teeth. They may need missing teeth replaced or broken fillings fixed. Or they may have dentures that need to be cared for. Some older adults may have trouble holding a toothbrush.  You can help remind the person you are caring for to brush and floss their teeth or to clean their dentures. In some cases, you may need to do the brushing and other dental care tasks. People who have trouble using their hands or who have dementia may need this extra help.  How can you help with dental care?  Normal dental care  To keep the teeth and gums healthy:  Brush the teeth with fluoride toothpaste twice a day--in the morning and at night--and floss at least once a day. Plaque can quickly build up on the teeth of older adults.  Watch for the signs of gum disease. These signs include gums that bleed after brushing or after eating hard foods, such as apples.  See a dentist regularly. Many experts recommend checkups every 6 months.  Keep the

## 2024-05-09 NOTE — PROGRESS NOTES
Medicare Annual Wellness Visit    Eyad S Aden is here for Medicare AWV (Non-fasting today and needs order for labs.  Mammogram order for [facility].  ), Hypertension (On medication.  ), Medication Management (Gabapentin.  Contract and UDS in office today. ), Pain (Low right side of back going into right hip and leg.  States she kept her grandchildren for 8 days on concrete floors.  ), and Headache (Reports been increased since this weather.  )    Assessment & Plan   Medication management  -     POCT Rapid Drug Screen  Encounter for screening mammogram for malignant neoplasm of breast  -     HENRY DIGITAL SCREEN W OR WO CAD BILATERAL; Future  Medicare annual wellness visit, subsequent    Recommendations for Preventive Services Due: see orders and patient instructions/AVS.  Recommended screening schedule for the next 5-10 years is provided to the patient in written form: see Patient Instructions/AVS.     No follow-ups on file.     Subjective       Patient's complete Health Risk Assessment and screening values have been reviewed and are found in Flowsheets. The following problems were reviewed today and where indicated follow up appointments were made and/or referrals ordered.    No Positive Risk Factors identified today.                     Dentist Screen:  Have you seen the dentist within the past year?: (!) No    Intervention:  Advised to schedule with their dentist                Objective   Vitals:    05/09/24 0904   BP: 120/72   Site: Left Upper Arm   Position: Sitting   Pulse: 88   Resp: 16   Temp: 97.4 °F (36.3 °C)   SpO2: 99%   Weight: 64 kg (141 lb)   Height: 1.626 m (5' 4\")      Body mass index is 24.2 kg/m².               Allergies   Allergen Reactions    Lipitor [Atorvastatin] Diarrhea and Nausea And Vomiting     Prior to Visit Medications    Medication Sig Taking? Authorizing Provider   gabapentin (NEURONTIN) 100 MG capsule TAKE (1) CAPSULE BY MOUTH FOUR TIMES DAILY FOR BURNING MOUTH SYNDROME Yes Devon

## 2024-05-10 ENCOUNTER — TELEPHONE (OUTPATIENT)
Dept: PRIMARY CARE CLINIC | Age: 74
End: 2024-05-10

## 2024-05-10 DIAGNOSIS — E78.2 MIXED HYPERLIPIDEMIA: ICD-10-CM

## 2024-05-10 DIAGNOSIS — I10 ESSENTIAL HYPERTENSION: ICD-10-CM

## 2024-05-10 DIAGNOSIS — E55.9 VITAMIN D DEFICIENCY: ICD-10-CM

## 2024-05-10 LAB
25(OH)D3 SERPL-MCNC: 83.2 NG/ML
ALBUMIN SERPL-MCNC: 4.4 G/DL (ref 3.5–5.2)
ALP SERPL-CCNC: 119 U/L (ref 35–104)
ALT SERPL-CCNC: 33 U/L (ref 5–33)
ANION GAP SERPL CALCULATED.3IONS-SCNC: 14 MMOL/L (ref 7–19)
AST SERPL-CCNC: 25 U/L (ref 5–32)
BILIRUB SERPL-MCNC: 0.2 MG/DL (ref 0.2–1.2)
BUN SERPL-MCNC: 27 MG/DL (ref 8–23)
CALCIUM SERPL-MCNC: 9.8 MG/DL (ref 8.8–10.2)
CHLORIDE SERPL-SCNC: 106 MMOL/L (ref 98–111)
CHOLEST SERPL-MCNC: 184 MG/DL (ref 160–199)
CO2 SERPL-SCNC: 20 MMOL/L (ref 22–29)
CREAT SERPL-MCNC: 0.8 MG/DL (ref 0.5–0.9)
ERYTHROCYTE [DISTWIDTH] IN BLOOD BY AUTOMATED COUNT: 13.5 % (ref 11.5–14.5)
GLUCOSE SERPL-MCNC: 98 MG/DL (ref 74–109)
HCT VFR BLD AUTO: 45.3 % (ref 37–47)
HDLC SERPL-MCNC: 53 MG/DL (ref 65–121)
HGB BLD-MCNC: 14.1 G/DL (ref 12–16)
LDLC SERPL CALC-MCNC: 76 MG/DL
MCH RBC QN AUTO: 28.8 PG (ref 27–31)
MCHC RBC AUTO-ENTMCNC: 31.1 G/DL (ref 33–37)
MCV RBC AUTO: 92.4 FL (ref 81–99)
PLATELET # BLD AUTO: 240 K/UL (ref 130–400)
PMV BLD AUTO: 10.3 FL (ref 9.4–12.3)
POTASSIUM SERPL-SCNC: 4.4 MMOL/L (ref 3.5–5)
PROT SERPL-MCNC: 6.9 G/DL (ref 6.6–8.7)
RBC # BLD AUTO: 4.9 M/UL (ref 4.2–5.4)
SODIUM SERPL-SCNC: 140 MMOL/L (ref 136–145)
TRIGL SERPL-MCNC: 274 MG/DL (ref 0–149)
WBC # BLD AUTO: 5.4 K/UL (ref 4.8–10.8)

## 2024-05-10 RX ORDER — METHYLPREDNISOLONE 4 MG/1
TABLET ORAL
Qty: 1 KIT | Refills: 0 | Status: SHIPPED | OUTPATIENT
Start: 2024-05-10 | End: 2024-05-16

## 2024-05-10 NOTE — TELEPHONE ENCOUNTER
Pt was in yesterday and saw Dr Osorio for leg pain, she put in referral to PT but she forgot to send in the medrol dose contreras. I did verify this with Dr Osorio do you care to send this in please

## 2024-05-20 ENCOUNTER — PROCEDURE VISIT (OUTPATIENT)
Dept: PRIMARY CARE CLINIC | Age: 74
End: 2024-05-20

## 2024-05-20 VITALS
HEART RATE: 86 BPM | BODY MASS INDEX: 24.55 KG/M2 | TEMPERATURE: 97.4 F | WEIGHT: 143.8 LBS | SYSTOLIC BLOOD PRESSURE: 130 MMHG | DIASTOLIC BLOOD PRESSURE: 70 MMHG | OXYGEN SATURATION: 98 % | HEIGHT: 64 IN

## 2024-05-20 DIAGNOSIS — L98.9 CHANGING SKIN LESION: Primary | ICD-10-CM

## 2024-05-20 DIAGNOSIS — D49.2 NEOPLASM OF UNSPECIFIED BEHAVIOR OF BONE, SOFT TISSUE, AND SKIN: ICD-10-CM

## 2024-05-21 ASSESSMENT — ENCOUNTER SYMPTOMS: RESPIRATORY NEGATIVE: 1

## 2024-05-21 NOTE — PROGRESS NOTES
SUBJECTIVE:    Eyad Aden is 73 y.o.female who comes in complaining of Procedure   .       HPI: Mrs. Aden comes in today for removal of a skin lesion found during her checkup.  She says it has been increasing in size.  She is not sure what it is.  She also has some smaller spots just between her breast she would like me to look at.    She feels good today.  No problems with local anesthetic agents.      Allergies   Allergen Reactions    Lipitor [Atorvastatin] Diarrhea and Nausea And Vomiting       Social History     Socioeconomic History    Marital status:      Spouse name: Marquis    Number of children: 3    Years of education: None    Highest education level: None   Occupational History    Occupation: Teacher   Tobacco Use    Smoking status: Never    Smokeless tobacco: Never   Vaping Use    Vaping Use: Never used   Substance and Sexual Activity    Alcohol use: No    Drug use: No    Sexual activity: Yes     Partners: Male     Social Determinants of Health     Financial Resource Strain: Low Risk  (5/9/2024)    Overall Financial Resource Strain (CARDIA)     Difficulty of Paying Living Expenses: Not very hard   Food Insecurity: No Food Insecurity (5/9/2024)    Hunger Vital Sign     Worried About Running Out of Food in the Last Year: Never true     Ran Out of Food in the Last Year: Never true   Transportation Needs: Unknown (5/9/2024)    PRAPARE - Transportation     Lack of Transportation (Non-Medical): No   Physical Activity: Insufficiently Active (5/9/2024)    Exercise Vital Sign     Days of Exercise per Week: 4 days     Minutes of Exercise per Session: 30 min   Housing Stability: Unknown (5/9/2024)    Housing Stability Vital Sign     Unstable Housing in the Last Year: No       Review of Systems   Constitutional:  Negative for fever.   Respiratory: Negative.     Cardiovascular: Negative.    Hematological:  Does not bruise/bleed easily.   Psychiatric/Behavioral: Negative.           Current Outpatient

## 2024-05-31 DIAGNOSIS — N39.46 MIXED URINARY INCONTINENCE DUE TO FEMALE GENITAL PROLAPSE: ICD-10-CM

## 2024-05-31 DIAGNOSIS — N81.10 FEMALE BLADDER PROLAPSE: Primary | ICD-10-CM

## 2024-05-31 DIAGNOSIS — N81.9 MIXED URINARY INCONTINENCE DUE TO FEMALE GENITAL PROLAPSE: ICD-10-CM

## 2024-06-04 ENCOUNTER — TELEPHONE (OUTPATIENT)
Dept: PRIMARY CARE CLINIC | Age: 74
End: 2024-06-04

## 2024-06-04 RX ORDER — METHYLPREDNISOLONE 4 MG/1
TABLET ORAL
Qty: 1 KIT | Refills: 0 | Status: SHIPPED | OUTPATIENT
Start: 2024-06-04

## 2024-06-04 NOTE — TELEPHONE ENCOUNTER
Pt states she has been going to Physical Therapy at  and they have suggested a Medrol dos contreras for inflammation. Asking if you can send to [facility]?

## 2024-06-06 RX ORDER — TOPIRAMATE 200 MG/1
1 CAPSULE, EXTENDED RELEASE ORAL NIGHTLY
Qty: 30 EACH | Refills: 3 | Status: SHIPPED | OUTPATIENT
Start: 2024-06-06

## 2024-06-13 DIAGNOSIS — G43.109 MIGRAINE WITH AURA AND WITHOUT STATUS MIGRAINOSUS, NOT INTRACTABLE: ICD-10-CM

## 2024-06-13 RX ORDER — BUTALBITAL, ACETAMINOPHEN AND CAFFEINE 50; 325; 40 MG/1; MG/1; MG/1
TABLET ORAL
Qty: 45 TABLET | Refills: 2 | Status: SHIPPED | OUTPATIENT
Start: 2024-06-13

## 2024-06-14 ENCOUNTER — PROCEDURE VISIT (OUTPATIENT)
Dept: NEUROLOGY | Facility: CLINIC | Age: 74
End: 2024-06-14
Payer: MEDICARE

## 2024-06-14 DIAGNOSIS — G43.719 INTRACTABLE CHRONIC MIGRAINE WITHOUT AURA AND WITHOUT STATUS MIGRAINOSUS: Primary | ICD-10-CM

## 2024-06-21 DIAGNOSIS — B37.0 THRUSH: ICD-10-CM

## 2024-08-07 DIAGNOSIS — K14.6 BURNING MOUTH SYNDROME: ICD-10-CM

## 2024-08-07 RX ORDER — GABAPENTIN 100 MG/1
CAPSULE ORAL
Qty: 120 CAPSULE | Refills: 2 | Status: SHIPPED | OUTPATIENT
Start: 2024-08-07 | End: 2024-11-07

## 2024-08-07 NOTE — TELEPHONE ENCOUNTER
Kennedybaldo S Aden called to request a refill on her medication.      Last office visit : 5/20/2024   Next office visit : Visit date not found     Last UDS:   Benzodiazepine Screen, Urine   Date Value Ref Range Status   05/09/2024 n  Final     Buprenorphine Urine   Date Value Ref Range Status   05/09/2024 n  Final     Cocaine Metabolite Screen, Urine   Date Value Ref Range Status   05/09/2024 n  Final     Gabapentin Screen, Urine   Date Value Ref Range Status   05/09/2024 na  Final     Oxycodone Screen, Ur   Date Value Ref Range Status   05/09/2024 n  Final     Propoxyphene Screen, Urine   Date Value Ref Range Status   05/09/2024 na  Final     THC Screen, Urine   Date Value Ref Range Status   05/09/2024 n  Final     Tricyclic Antidepressants, Urine   Date Value Ref Range Status   05/09/2024 n  Final         Medication Contract: 5/9/24   Last Fill:  3 months ago (4/29/2024) by Tiara Osorio MD     Requested Prescriptions     Pending Prescriptions Disp Refills    gabapentin (NEURONTIN) 100 MG capsule [Pharmacy Med Name: GABAPENTIN 100 MG CAPSULE] 120 capsule 0     Sig: TAKE (1) CAPSULE BY MOUTH FOUR TIMES DAILY FOR BURNING MOUTH SYNDROME         Please approve or refuse this medication.   Cynthia Mace LPN

## 2024-08-15 ENCOUNTER — TELEPHONE (OUTPATIENT)
Dept: NEUROSURGERY | Age: 74
End: 2024-08-15

## 2024-08-15 NOTE — TELEPHONE ENCOUNTER
[facility] New Patient Questionnaire    Diagnosis/Reason for Referral?    Pain in left hip  M54.50 (ICD-10-CM) - Low back pain     2. Who is completing questionnaire?      Patient  Caregiver Family      3. Has the patient had any previous spinal/brain surgeries?  NO      A. If yes, what is the name of the facility in which the surgery was performed?       B. Procedure/Surgery performed?       C. Who was the surgeon?       D. When was the surgery?    MM/YY       E. Did the patient improve after the surgery?        4. Is this a second opinion?   If yes, Dr. Castro would like to review patient first before making the appointment.      5. Have MRI Images been obtain within the last year?     Yes  No      XR  CT     If yes, where was the imaging performed?  [facility]   If yes, what part of the body?      Lumbar  Cervical  Thoracic  Brain     If yes, when was it obtained?          Note: if the scan was performed at a facility other than [facility], the disc will need to be brought to the appointment or we need to reach out to obtain the disc.     A. Was the patient instructed to provide the disc?      Yes   No      8. Has the patient had a NCV/EMG within the last year?      Yes  No     If yes, where was it performed and date?      MM/YY  Location:      9. Has the patient been to Physical Therapy?      Yes  No     If yes, what location, how long attended, and last visit?    Location: [facility]      Therapy Lasted:    Date of Last Visit:2.5 WEEKS      10. Has the patient been to Pain Management?     Yes  No     If yes, what location and last visit     Location:   Last Visit:   Is it helping?

## 2024-08-27 ENCOUNTER — HOSPITAL ENCOUNTER (OUTPATIENT)
Dept: PAIN MANAGEMENT | Age: 74
Discharge: HOME | End: 2024-08-27
Payer: MEDICARE

## 2024-08-27 VITALS
HEART RATE: 76 BPM | TEMPERATURE: 96.9 F | SYSTOLIC BLOOD PRESSURE: 131 MMHG | RESPIRATION RATE: 18 BRPM | OXYGEN SATURATION: 97 % | DIASTOLIC BLOOD PRESSURE: 84 MMHG

## 2024-08-27 DIAGNOSIS — R52 PAIN MANAGEMENT: ICD-10-CM

## 2024-08-27 PROCEDURE — G0260 INJ FOR SACROILIAC JT ANESTH: HCPCS

## 2024-08-27 PROCEDURE — 6360000002 HC RX W HCPCS

## 2024-08-27 PROCEDURE — 2580000003 HC RX 258

## 2024-08-27 PROCEDURE — 2500000003 HC RX 250 WO HCPCS

## 2024-08-27 RX ORDER — SODIUM CHLORIDE 9 MG/ML
3 INJECTION, SOLUTION INTRAMUSCULAR; INTRAVENOUS; SUBCUTANEOUS ONCE
Status: DISCONTINUED | OUTPATIENT
Start: 2024-08-27 | End: 2024-08-29 | Stop reason: HOSPADM

## 2024-08-27 RX ORDER — BUPIVACAINE HYDROCHLORIDE 5 MG/ML
2 INJECTION, SOLUTION EPIDURAL; INTRACAUDAL ONCE
Status: DISCONTINUED | OUTPATIENT
Start: 2024-08-27 | End: 2024-08-29 | Stop reason: HOSPADM

## 2024-08-27 RX ORDER — LIDOCAINE HYDROCHLORIDE 10 MG/ML
7 INJECTION, SOLUTION EPIDURAL; INFILTRATION; INTRACAUDAL; PERINEURAL ONCE
Status: DISCONTINUED | OUTPATIENT
Start: 2024-08-27 | End: 2024-08-29 | Stop reason: HOSPADM

## 2024-08-27 RX ORDER — METHYLPREDNISOLONE ACETATE 80 MG/ML
80 INJECTION, SUSPENSION INTRA-ARTICULAR; INTRALESIONAL; INTRAMUSCULAR; SOFT TISSUE ONCE
Status: DISCONTINUED | OUTPATIENT
Start: 2024-08-27 | End: 2024-08-29 | Stop reason: HOSPADM

## 2024-08-27 ASSESSMENT — PAIN - FUNCTIONAL ASSESSMENT
PAIN_FUNCTIONAL_ASSESSMENT: PREVENTS OR INTERFERES SOME ACTIVE ACTIVITIES AND ADLS
PAIN_FUNCTIONAL_ASSESSMENT: 0-10

## 2024-08-27 ASSESSMENT — PAIN DESCRIPTION - FREQUENCY: FREQUENCY: INTERMITTENT

## 2024-08-27 ASSESSMENT — PAIN DESCRIPTION - LOCATION: LOCATION: BACK;SACRUM

## 2024-08-27 ASSESSMENT — PAIN DESCRIPTION - DESCRIPTORS: DESCRIPTORS: ACHING

## 2024-08-27 ASSESSMENT — PAIN DESCRIPTION - ORIENTATION: ORIENTATION: RIGHT

## 2024-08-27 ASSESSMENT — PAIN SCALES - GENERAL: PAINLEVEL_OUTOF10: 4

## 2024-08-27 ASSESSMENT — PAIN DESCRIPTION - PAIN TYPE: TYPE: CHRONIC PAIN

## 2024-08-27 NOTE — INTERVAL H&P NOTE
Update History & Physical    The patient's History and Physical  was reviewed with the patient and I examined the patient. There was no change. The surgical site was confirmed by the patient and me.     Plan: The risks, benefits, expected outcome, and alternative to the recommended procedure have been discussed with the patient. Patient understands and wants to proceed with the procedure.     Electronically signed by Evangelist Bah MD on 8/27/2024 at 8:41 AM

## 2024-09-03 ENCOUNTER — ANCILLARY PROCEDURE (OUTPATIENT)
Dept: PRIMARY CARE CLINIC | Age: 74
End: 2024-09-03

## 2024-09-03 ENCOUNTER — OFFICE VISIT (OUTPATIENT)
Dept: PRIMARY CARE CLINIC | Age: 74
End: 2024-09-03
Payer: MEDICARE

## 2024-09-03 VITALS
TEMPERATURE: 96.9 F | RESPIRATION RATE: 18 BRPM | SYSTOLIC BLOOD PRESSURE: 118 MMHG | HEART RATE: 83 BPM | OXYGEN SATURATION: 98 % | DIASTOLIC BLOOD PRESSURE: 78 MMHG | BODY MASS INDEX: 23.89 KG/M2 | WEIGHT: 143.4 LBS | HEIGHT: 65 IN

## 2024-09-03 DIAGNOSIS — G43.109 MIGRAINE WITH AURA AND WITHOUT STATUS MIGRAINOSUS, NOT INTRACTABLE: ICD-10-CM

## 2024-09-03 DIAGNOSIS — Z12.31 ENCOUNTER FOR SCREENING MAMMOGRAM FOR MALIGNANT NEOPLASM OF BREAST: ICD-10-CM

## 2024-09-03 DIAGNOSIS — M94.0 COSTOCHONDRITIS: Primary | ICD-10-CM

## 2024-09-03 DIAGNOSIS — M94.0 COSTOCHONDRITIS: ICD-10-CM

## 2024-09-03 PROCEDURE — 99214 OFFICE O/P EST MOD 30 MIN: CPT | Performed by: FAMILY MEDICINE

## 2024-09-03 PROCEDURE — 3078F DIAST BP <80 MM HG: CPT | Performed by: FAMILY MEDICINE

## 2024-09-03 PROCEDURE — 1090F PRES/ABSN URINE INCON ASSESS: CPT | Performed by: FAMILY MEDICINE

## 2024-09-03 PROCEDURE — 1036F TOBACCO NON-USER: CPT | Performed by: FAMILY MEDICINE

## 2024-09-03 PROCEDURE — 3074F SYST BP LT 130 MM HG: CPT | Performed by: FAMILY MEDICINE

## 2024-09-03 PROCEDURE — G8399 PT W/DXA RESULTS DOCUMENT: HCPCS | Performed by: FAMILY MEDICINE

## 2024-09-03 PROCEDURE — G8420 CALC BMI NORM PARAMETERS: HCPCS | Performed by: FAMILY MEDICINE

## 2024-09-03 PROCEDURE — 3017F COLORECTAL CA SCREEN DOC REV: CPT | Performed by: FAMILY MEDICINE

## 2024-09-03 PROCEDURE — 1123F ACP DISCUSS/DSCN MKR DOCD: CPT | Performed by: FAMILY MEDICINE

## 2024-09-03 PROCEDURE — G8427 DOCREV CUR MEDS BY ELIG CLIN: HCPCS | Performed by: FAMILY MEDICINE

## 2024-09-03 RX ORDER — BUTALBITAL, ACETAMINOPHEN AND CAFFEINE 50; 325; 40 MG/1; MG/1; MG/1
TABLET ORAL
Qty: 45 TABLET | Refills: 2 | Status: SHIPPED | OUTPATIENT
Start: 2024-09-03

## 2024-09-03 NOTE — PROGRESS NOTES
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/2014        Vitamin D deficiency       Past Surgical History:   Procedure Laterality Date    BLEPHAROPLASTY      KNEE SURGERY Right 09/08/2014    Partial knee replacement: Dr Urbano    RECTOCELE REPAIR      TOTAL KNEE ARTHROPLASTY        Allergies   Allergen Reactions    Lipitor [Atorvastatin] Diarrhea and Nausea And Vomiting        Lab Results   Component Value Date     05/10/2024    K 4.4 05/10/2024     05/10/2024    CO2 20 (L) 05/10/2024    BUN 27 (H) 05/10/2024    CREATININE 0.8 05/10/2024    GLUCOSE 98 05/10/2024    CALCIUM 9.8 05/10/2024    BILITOT 0.2 05/10/2024    ALKPHOS 119 (H) 05/10/2024    AST 25 05/10/2024    ALT 33 05/10/2024    LABGLOM 78 05/10/2024    GFRAA >59 04/11/2022    AGRATIO 2.0 11/08/2016    GLOB 2.1 11/08/2016        Lab Results   Component Value Date    WBC 5.4 05/10/2024    HGB 14.1 05/10/2024    HCT 45.3 05/10/2024    MCV 92.4 05/10/2024     05/10/2024                EMR Dragon/transcription disclaimer:  Much of this encounter note is electronic transcription/translation of spoken language toprinted texts.  The electronic translation of spoken language may be erroneous, or at times, nonsensical words or phrases may be inadvertently transcribed.  Although I have reviewed the note for such errors, some may stillexist.      An electronic signature was used to authenticate this note.    --Angelo Hernandez MD

## 2024-09-06 ASSESSMENT — ENCOUNTER SYMPTOMS
WHEEZING: 0
SHORTNESS OF BREATH: 0
ABDOMINAL PAIN: 0
BLOOD IN STOOL: 0
CHEST TIGHTNESS: 0

## 2024-09-13 ENCOUNTER — PROCEDURE VISIT (OUTPATIENT)
Dept: NEUROLOGY | Facility: CLINIC | Age: 74
End: 2024-09-13
Payer: MEDICARE

## 2024-09-13 DIAGNOSIS — G43.719 INTRACTABLE CHRONIC MIGRAINE WITHOUT AURA AND WITHOUT STATUS MIGRAINOSUS: Primary | ICD-10-CM

## 2024-09-20 DIAGNOSIS — B37.0 THRUSH: ICD-10-CM

## 2024-09-20 RX ORDER — NYSTATIN 100000 [USP'U]/ML
SUSPENSION ORAL
Qty: 180 ML | Refills: 0 | Status: SHIPPED | OUTPATIENT
Start: 2024-09-20

## 2024-10-10 RX ORDER — TOPIRAMATE 200 MG/1
1 CAPSULE, EXTENDED RELEASE ORAL NIGHTLY
Qty: 90 EACH | Refills: 1 | Status: SHIPPED | OUTPATIENT
Start: 2024-10-10

## 2024-11-04 DIAGNOSIS — K14.6 BURNING MOUTH SYNDROME: ICD-10-CM

## 2024-11-04 RX ORDER — GABAPENTIN 100 MG/1
CAPSULE ORAL
Qty: 120 CAPSULE | Refills: 0 | Status: SHIPPED | OUTPATIENT
Start: 2024-11-04 | End: 2024-12-04

## 2024-11-04 NOTE — TELEPHONE ENCOUNTER
Provider needs to review PDMP    PDMP Monitoring:    Last PDMP Andre as Reviewed ([place]):  Review User Review Instant Review Result   JAY JIMENES 6/13/2024 11:32 AM Reviewed PDMP [1]     Urine Drug Screenings (1 yr)       POCT Rapid Drug Screen  Collected: 5/9/2024  9:47 AM (Final result)              POCT Rapid Drug Screen  Collected: 5/2/2023 (Final result)              POCT Rapid Drug Screen  Collected: 1/10/2022 (Final result)              POCT Rapid Drug Screen  Collected: 7/27/2020 12:22 PM (Final result)                  Medication Contract and Consent for Opioid Use Documents Filed       Patient Documents       Type of Document Status Date Received Received By Description    Medication Contract Signed 1/23/2018  1:40 PM MYLENE CONDE     Medication Contract Received 8/6/2019  5:19 PM ALBA FREDERICK Medication agreement 7-8-2019    Medication Contract Received 7/28/2020 12:39 PM LANA SANTOS     Medication Contract Received 5/9/2024  5:00 PM LANA SANTOS 5-9-24 Med contract

## 2024-11-11 DIAGNOSIS — F51.04 CHRONIC INSOMNIA: Primary | ICD-10-CM

## 2024-11-11 RX ORDER — ZOLPIDEM TARTRATE 5 MG/1
5 TABLET ORAL NIGHTLY PRN
Qty: 30 TABLET | Refills: 2 | Status: SHIPPED | OUTPATIENT
Start: 2024-11-11 | End: 2025-02-09

## 2024-11-11 NOTE — TELEPHONE ENCOUNTER
PDMP Monitoring:    Last PDMP Andre [place] Reviewed ([place]):  Review User Review Instant Review Result   THERESA BARRETT 11/4/2024 11:36 AM Reviewed PDMP [1]     Urine Drug Screenings (1 yr)       POCT Rapid Drug Screen  Collected: 5/9/2024  9:47 AM (Final result)              POCT Rapid Drug Screen  Collected: 5/2/2023 (Final result)              POCT Rapid Drug Screen  Collected: 1/10/2022 (Final result)              POCT Rapid Drug Screen  Collected: 7/27/2020 12:22 PM (Final result)                  Medication Contract and Consent for Opioid Use Documents Filed       Patient Documents       Type of Document Status Date Received Received By Description    Medication Contract Signed 1/23/2018  1:40 PM MYLENE CONDE     Medication Contract Received 8/6/2019  5:19 PM ALBA FREDERICK Medication agreement 7-8-2019    Medication Contract Received 7/28/2020 12:39 PM LANA SANTOS     Medication Contract Received 5/9/2024  5:00 PM LANA SANTOS 5-9-24 Med contract

## 2024-11-25 DIAGNOSIS — G43.109 MIGRAINE WITH AURA AND WITHOUT STATUS MIGRAINOSUS, NOT INTRACTABLE: ICD-10-CM

## 2024-11-25 RX ORDER — BUTALBITAL, ACETAMINOPHEN AND CAFFEINE 50; 325; 40 MG/1; MG/1; MG/1
TABLET ORAL
Qty: 45 TABLET | Refills: 0 | Status: SHIPPED | OUTPATIENT
Start: 2024-11-25

## 2024-12-10 ENCOUNTER — HOSPITAL ENCOUNTER (OUTPATIENT)
Dept: PAIN MANAGEMENT | Age: 74
Discharge: HOME | End: 2024-12-10
Payer: MEDICARE

## 2024-12-10 VITALS
TEMPERATURE: 97.3 F | SYSTOLIC BLOOD PRESSURE: 129 MMHG | RESPIRATION RATE: 18 BRPM | HEART RATE: 73 BPM | DIASTOLIC BLOOD PRESSURE: 75 MMHG | OXYGEN SATURATION: 98 %

## 2024-12-10 DIAGNOSIS — R52 PAIN MANAGEMENT: ICD-10-CM

## 2024-12-10 PROCEDURE — G0260 INJ FOR SACROILIAC JT ANESTH: HCPCS

## 2024-12-10 PROCEDURE — 6360000002 HC RX W HCPCS

## 2024-12-10 PROCEDURE — 2580000003 HC RX 258

## 2024-12-10 RX ORDER — BUPIVACAINE HYDROCHLORIDE 5 MG/ML
2 INJECTION, SOLUTION EPIDURAL; INTRACAUDAL ONCE
Status: DISCONTINUED | OUTPATIENT
Start: 2024-12-10 | End: 2024-12-12 | Stop reason: HOSPADM

## 2024-12-10 RX ORDER — LIDOCAINE HYDROCHLORIDE 10 MG/ML
7 INJECTION, SOLUTION EPIDURAL; INFILTRATION; INTRACAUDAL; PERINEURAL ONCE
Status: DISCONTINUED | OUTPATIENT
Start: 2024-12-10 | End: 2024-12-12 | Stop reason: HOSPADM

## 2024-12-10 RX ORDER — TRIAMCINOLONE ACETONIDE 40 MG/ML
80 INJECTION, SUSPENSION INTRA-ARTICULAR; INTRAMUSCULAR ONCE
Status: DISCONTINUED | OUTPATIENT
Start: 2024-12-10 | End: 2024-12-12 | Stop reason: HOSPADM

## 2024-12-10 RX ORDER — SODIUM CHLORIDE 9 MG/ML
3 INJECTION, SOLUTION INTRAMUSCULAR; INTRAVENOUS; SUBCUTANEOUS ONCE
Status: DISCONTINUED | OUTPATIENT
Start: 2024-12-10 | End: 2024-12-12 | Stop reason: HOSPADM

## 2024-12-10 NOTE — INTERVAL H&P NOTE
Update History & Physical    The patient's History and Physical  was reviewed with the patient and I examined the patient. There was no change. The surgical site was confirmed by the patient and me.     Plan: The risks, benefits, expected outcome, and alternative to the recommended procedure have been discussed with the patient. Patient understands and wants to proceed with the procedure.     Electronically signed by Evangelist Bah MD on 12/10/2024 at 9:33 AM

## 2024-12-12 ENCOUNTER — PROCEDURE VISIT (OUTPATIENT)
Dept: NEUROLOGY | Facility: CLINIC | Age: 74
End: 2024-12-12
Payer: MEDICARE

## 2024-12-12 DIAGNOSIS — G43.719 INTRACTABLE CHRONIC MIGRAINE WITHOUT AURA AND WITHOUT STATUS MIGRAINOSUS: Primary | ICD-10-CM

## 2024-12-17 DIAGNOSIS — G43.109 MIGRAINE WITH AURA AND WITHOUT STATUS MIGRAINOSUS, NOT INTRACTABLE: ICD-10-CM

## 2024-12-17 RX ORDER — BUTALBITAL, ACETAMINOPHEN AND CAFFEINE 50; 325; 40 MG/1; MG/1; MG/1
TABLET ORAL
Qty: 45 TABLET | Refills: 2 | Status: SHIPPED | OUTPATIENT
Start: 2024-12-17

## 2025-01-08 RX ORDER — SUCRALFATE 1 G/1
TABLET ORAL
Qty: 120 TABLET | Refills: 5 | Status: SHIPPED | OUTPATIENT
Start: 2025-01-08

## 2025-01-15 ENCOUNTER — TELEPHONE (OUTPATIENT)
Dept: NEUROLOGY | Facility: CLINIC | Age: 75
End: 2025-01-15
Payer: MEDICARE

## 2025-01-15 NOTE — TELEPHONE ENCOUNTER
Caller: Vincent Gray    Relationship: Self    Best call back number: [phone] (home)     What was the call regarding: THE PT CALLED REGARDING HER BOTOX APPT AND SHE WANTS TO KNOW IF SHE COULD HAVE A MORNING APPT.     PLEASE ADVISE  THANK YOU

## 2025-01-16 DIAGNOSIS — F51.04 CHRONIC INSOMNIA: ICD-10-CM

## 2025-01-16 NOTE — TELEPHONE ENCOUNTER
PDMP Monitoring:    Last PDMP Andre [place] Reviewed ([place]):  Review User Review Instant Review Result   THERESA BARRETT 11/4/2024 11:36 AM Reviewed PDMP [1]       Last office visit for requested medication : 5/9/2024    Next office visit : 5/12/2025     Last UDS:   Benzodiazepine Screen, Urine   Date Value Ref Range Status   05/09/2024 n  Final     Buprenorphine Urine   Date Value Ref Range Status   05/09/2024 n  Final     Cocaine Metabolite Screen, Urine   Date Value Ref Range Status   05/09/2024 n  Final     Gabapentin Screen, Urine   Date Value Ref Range Status   05/09/2024 na  Final     Oxycodone Screen, Ur   Date Value Ref Range Status   05/09/2024 n  Final     Propoxyphene Screen, Urine   Date Value Ref Range Status   05/09/2024 na  Final     THC Screen, Urine   Date Value Ref Range Status   05/09/2024 n  Final     Tricyclic Antidepressants, Urine   Date Value Ref Range Status   05/09/2024 n  Final       Medication Contract and Consent for Opioid Use Documents Filed       Patient Documents       Type of Document Status Date Received Received By Description    Medication Contract Signed 1/23/2018  1:40 PM MYLENE CONDE     Medication Contract Received 8/6/2019  5:19 PM ALBA FREDERICK Medication agreement 7-8-2019    Medication Contract Received 7/28/2020 12:39 PM LANA SANTOS     Medication Contract Received 5/9/2024  5:00 PM LANA SANTOS 5-9-24 Med contract                    Requested Prescriptions     Pending Prescriptions Disp Refills    zolpidem (AMBIEN) 5 MG tablet [Pharmacy Med Name: ZOLPIDEM TARTRATE 5 MG TABLET] 30 tablet 0     Sig: TAKE 1 TABLET BY MOUTH AT BEDTIME AS NEEDED FOR SLEEP. MAX DAILY AMOUNT: 5MG         Please approve or refuse this medication.   Katia Streeter MA

## 2025-01-17 RX ORDER — ZOLPIDEM TARTRATE 5 MG/1
5 TABLET ORAL NIGHTLY PRN
Qty: 30 TABLET | Refills: 2 | Status: SHIPPED | OUTPATIENT
Start: 2025-01-17 | End: 2025-04-17

## 2025-01-28 DIAGNOSIS — K21.9 GASTROESOPHAGEAL REFLUX DISEASE WITHOUT ESOPHAGITIS: Primary | ICD-10-CM

## 2025-01-28 RX ORDER — OMEPRAZOLE 40 MG/1
CAPSULE, DELAYED RELEASE ORAL
Qty: 90 CAPSULE | Refills: 1 | Status: SHIPPED | OUTPATIENT
Start: 2025-01-28

## 2025-02-14 DIAGNOSIS — E78.2 MIXED HYPERLIPIDEMIA: ICD-10-CM

## 2025-02-14 RX ORDER — ATORVASTATIN CALCIUM 20 MG/1
TABLET, FILM COATED ORAL
Qty: 90 TABLET | Refills: 0 | Status: SHIPPED | OUTPATIENT
Start: 2025-02-14

## 2025-02-24 DIAGNOSIS — G43.109 MIGRAINE WITH AURA AND WITHOUT STATUS MIGRAINOSUS, NOT INTRACTABLE: ICD-10-CM

## 2025-02-24 RX ORDER — BUTALBITAL, ACETAMINOPHEN AND CAFFEINE 50; 325; 40 MG/1; MG/1; MG/1
TABLET ORAL
Qty: 45 TABLET | Refills: 2 | Status: SHIPPED | OUTPATIENT
Start: 2025-02-24

## 2025-02-24 NOTE — TELEPHONE ENCOUNTER
Provider needs to review PDMP    Last appointment for medication 5/9/24. Med contract 5/9/24.  PDMP Monitoring:    Last PDMP Andre [place] Reviewed ([place]):  Review User Review Instant Review Result   JAY JIMENES 1/17/2025  6:41 AM Reviewed PDMP [1]     Urine Drug Screenings (1 yr)       POCT Rapid Drug Screen  Collected: 5/9/2024  9:47 AM (Final result)              POCT Rapid Drug Screen  Collected: 5/2/2023 (Final result)              POCT Rapid Drug Screen  Collected: 1/10/2022 (Final result)              POCT Rapid Drug Screen  Collected: 7/27/2020 12:22 PM (Final result)                  Medication Contract and Consent for Opioid Use Documents Filed       Patient Documents       Type of Document Status Date Received Received By Description    Medication Contract Signed 1/23/2018  1:40 PM MYLENE CONDE     Medication Contract Received 8/6/2019  5:19 PM ALBA FREDERICK Medication agreement 7-8-2019    Medication Contract Received 7/28/2020 12:39 PM LANA SANTOS     Medication Contract Received 5/9/2024  5:00 PM LANA SANTOS 5-9-24 Med contract

## 2025-02-25 ENCOUNTER — OFFICE VISIT (OUTPATIENT)
Dept: PRIMARY CARE CLINIC | Age: 75
End: 2025-02-25
Payer: MEDICARE

## 2025-02-25 VITALS
DIASTOLIC BLOOD PRESSURE: 70 MMHG | HEART RATE: 95 BPM | HEIGHT: 65 IN | RESPIRATION RATE: 18 BRPM | OXYGEN SATURATION: 96 % | TEMPERATURE: 97 F | SYSTOLIC BLOOD PRESSURE: 119 MMHG | BODY MASS INDEX: 23.99 KG/M2 | WEIGHT: 144 LBS

## 2025-02-25 DIAGNOSIS — M25.511 RIGHT ANTERIOR SHOULDER PAIN: ICD-10-CM

## 2025-02-25 DIAGNOSIS — R07.9 CHEST PAIN, UNSPECIFIED TYPE: Primary | ICD-10-CM

## 2025-02-25 PROBLEM — J30.2 SEASONAL ALLERGIES: Status: ACTIVE | Noted: 2025-02-25

## 2025-02-25 PROCEDURE — 1036F TOBACCO NON-USER: CPT | Performed by: FAMILY MEDICINE

## 2025-02-25 PROCEDURE — G8427 DOCREV CUR MEDS BY ELIG CLIN: HCPCS | Performed by: FAMILY MEDICINE

## 2025-02-25 PROCEDURE — 1159F MED LIST DOCD IN RCRD: CPT | Performed by: FAMILY MEDICINE

## 2025-02-25 PROCEDURE — 99213 OFFICE O/P EST LOW 20 MIN: CPT | Performed by: FAMILY MEDICINE

## 2025-02-25 PROCEDURE — G8399 PT W/DXA RESULTS DOCUMENT: HCPCS | Performed by: FAMILY MEDICINE

## 2025-02-25 PROCEDURE — 93000 ELECTROCARDIOGRAM COMPLETE: CPT | Performed by: FAMILY MEDICINE

## 2025-02-25 PROCEDURE — 1160F RVW MEDS BY RX/DR IN RCRD: CPT | Performed by: FAMILY MEDICINE

## 2025-02-25 PROCEDURE — 3017F COLORECTAL CA SCREEN DOC REV: CPT | Performed by: FAMILY MEDICINE

## 2025-02-25 PROCEDURE — 1123F ACP DISCUSS/DSCN MKR DOCD: CPT | Performed by: FAMILY MEDICINE

## 2025-02-25 PROCEDURE — 3074F SYST BP LT 130 MM HG: CPT | Performed by: FAMILY MEDICINE

## 2025-02-25 PROCEDURE — 1090F PRES/ABSN URINE INCON ASSESS: CPT | Performed by: FAMILY MEDICINE

## 2025-02-25 PROCEDURE — G8420 CALC BMI NORM PARAMETERS: HCPCS | Performed by: FAMILY MEDICINE

## 2025-02-25 PROCEDURE — 3078F DIAST BP <80 MM HG: CPT | Performed by: FAMILY MEDICINE

## 2025-02-25 SDOH — ECONOMIC STABILITY: FOOD INSECURITY: WITHIN THE PAST 12 MONTHS, YOU WORRIED THAT YOUR FOOD WOULD RUN OUT BEFORE YOU GOT MONEY TO BUY MORE.: NEVER TRUE

## 2025-02-25 SDOH — ECONOMIC STABILITY: FOOD INSECURITY: WITHIN THE PAST 12 MONTHS, THE FOOD YOU BOUGHT JUST DIDN'T LAST AND YOU DIDN'T HAVE MONEY TO GET MORE.: NEVER TRUE

## 2025-02-25 ASSESSMENT — PATIENT HEALTH QUESTIONNAIRE - PHQ9
SUM OF ALL RESPONSES TO PHQ QUESTIONS 1-9: 0
SUM OF ALL RESPONSES TO PHQ9 QUESTIONS 1 & 2: 0
1. LITTLE INTEREST OR PLEASURE IN DOING THINGS: NOT AT ALL
SUM OF ALL RESPONSES TO PHQ QUESTIONS 1-9: 0
2. FEELING DOWN, DEPRESSED OR HOPELESS: NOT AT ALL

## 2025-02-26 NOTE — RESULT ENCOUNTER NOTE
Call Eyad and discuss results.  Patient notified.  We discussed EKG.  I saw no evidence of acute ischemia but we will consider doing a stress test in the very near future.  If she were to develop chest pain or shortness of breath she is to go to the emergency room.

## 2025-03-11 ENCOUNTER — HOSPITAL ENCOUNTER (OUTPATIENT)
Dept: PAIN MANAGEMENT | Age: 75
Discharge: HOME | End: 2025-03-11
Payer: MEDICARE

## 2025-03-11 VITALS
RESPIRATION RATE: 16 BRPM | SYSTOLIC BLOOD PRESSURE: 130 MMHG | HEART RATE: 78 BPM | DIASTOLIC BLOOD PRESSURE: 79 MMHG | OXYGEN SATURATION: 98 % | TEMPERATURE: 96.6 F

## 2025-03-11 DIAGNOSIS — R52 PAIN MANAGEMENT: ICD-10-CM

## 2025-03-11 PROCEDURE — 2580000003 HC RX 258

## 2025-03-11 PROCEDURE — G0260 INJ FOR SACROILIAC JT ANESTH: HCPCS

## 2025-03-11 PROCEDURE — 6360000002 HC RX W HCPCS

## 2025-03-11 RX ORDER — LIDOCAINE HYDROCHLORIDE 10 MG/ML
7 INJECTION, SOLUTION EPIDURAL; INFILTRATION; INTRACAUDAL; PERINEURAL ONCE
Status: DISCONTINUED | OUTPATIENT
Start: 2025-03-11 | End: 2025-03-13 | Stop reason: HOSPADM

## 2025-03-11 RX ORDER — TRIAMCINOLONE ACETONIDE 40 MG/ML
80 INJECTION, SUSPENSION INTRA-ARTICULAR; INTRAMUSCULAR ONCE
Status: DISCONTINUED | OUTPATIENT
Start: 2025-03-11 | End: 2025-03-13 | Stop reason: HOSPADM

## 2025-03-11 RX ORDER — SODIUM CHLORIDE 9 MG/ML
3 INJECTION, SOLUTION INTRAMUSCULAR; INTRAVENOUS; SUBCUTANEOUS ONCE
Status: DISCONTINUED | OUTPATIENT
Start: 2025-03-11 | End: 2025-03-13 | Stop reason: HOSPADM

## 2025-03-11 RX ORDER — BUPIVACAINE HYDROCHLORIDE 5 MG/ML
2 INJECTION, SOLUTION EPIDURAL; INTRACAUDAL ONCE
Status: DISCONTINUED | OUTPATIENT
Start: 2025-03-11 | End: 2025-03-13 | Stop reason: HOSPADM

## 2025-03-11 ASSESSMENT — PAIN - FUNCTIONAL ASSESSMENT: PAIN_FUNCTIONAL_ASSESSMENT: NONE - DENIES PAIN

## 2025-03-11 NOTE — INTERVAL H&P NOTE
Update History & Physical    The patient's History and Physical  was reviewed with the patient and I examined the patient. There was no change. The surgical site was confirmed by the patient and me.     Plan: The risks, benefits, expected outcome, and alternative to the recommended procedure have been discussed with the patient. Patient understands and wants to proceed with the procedure.     Electronically signed by Evangelist Bah MD on 3/11/2025 at 8:17 AM

## 2025-03-17 DIAGNOSIS — K14.6 BURNING MOUTH SYNDROME: ICD-10-CM

## 2025-03-17 RX ORDER — GABAPENTIN 100 MG/1
CAPSULE ORAL
Qty: 120 CAPSULE | Refills: 2 | Status: SHIPPED | OUTPATIENT
Start: 2025-03-17 | End: 2025-06-17

## 2025-03-17 NOTE — TELEPHONE ENCOUNTER
Provider needs to review PDMP    PDMP Monitoring:    Last PDMP Andre [place] Reviewed ([place]):  Review User Review Instant Review Result   JAY JIMENES 2/24/2025  1:17 PM Reviewed PDMP [1]       Last office visit for requested medication : PT has not be seen for this medication since 5-9-24 other visit were problem visits  Next office visit : 5/12/2025     Last UDS:   Benzodiazepine Screen, Urine   Date Value Ref Range Status   05/09/2024 n  Final     Buprenorphine Urine   Date Value Ref Range Status   05/09/2024 n  Final     Cocaine Metabolite Screen, Urine   Date Value Ref Range Status   05/09/2024 n  Final     Gabapentin Screen, Urine   Date Value Ref Range Status   05/09/2024 na  Final     Oxycodone Screen, Ur   Date Value Ref Range Status   05/09/2024 n  Final     Propoxyphene Screen, Urine   Date Value Ref Range Status   05/09/2024 na  Final     THC Screen, Urine   Date Value Ref Range Status   05/09/2024 n  Final     Tricyclic Antidepressants, Urine   Date Value Ref Range Status   05/09/2024 n  Final       Medication Contract and Consent for Opioid Use Documents Filed       Patient Documents       Type of Document Status Date Received Received By Description    Medication Contract Signed 1/23/2018  1:40 PM MYLENE CONDE     Medication Contract Received 8/6/2019  5:19 PM ALBA ORTEGA Medication agreement 7-8-2019    Medication Contract Received 7/28/2020 12:39 PM LANA SANTOS     Medication Contract Received 5/9/2024  5:00 PM LANA SANTOS 5-9-24 Med contract                    Requested Prescriptions     Pending Prescriptions Disp Refills    gabapentin (NEURONTIN) 100 MG capsule [Pharmacy Med Name: GABAPENTIN 100 MG CAPSULE] 120 capsule 0     Sig: TAKE (1) CAPSULE BY MOUTH FOUR TIMES DAILY FOR BURNING MOUTH SYNDROME         Please approve or refuse this medication.   Alba Ortega MA

## 2025-03-20 ENCOUNTER — PATIENT ROUNDING (BHMG ONLY) (OUTPATIENT)
Dept: NEUROLOGY | Facility: CLINIC | Age: 75
End: 2025-03-20
Payer: MEDICARE

## 2025-03-20 ENCOUNTER — PROCEDURE VISIT (OUTPATIENT)
Dept: NEUROLOGY | Facility: CLINIC | Age: 75
End: 2025-03-20
Payer: MEDICARE

## 2025-03-20 DIAGNOSIS — G43.719 INTRACTABLE CHRONIC MIGRAINE WITHOUT AURA AND WITHOUT STATUS MIGRAINOSUS: Primary | ICD-10-CM

## 2025-03-20 NOTE — PROGRESS NOTES
March 20, 2025    Hello, may I speak with Vincent Gray?    My name is Caryl      I am  with [facility]  [address]    [facility] [address] [phone].    Before we get started may I verify your date of birth? 1950    I am calling to officially welcome you to our practice and ask about your recent visit. Is this a good time to talk? yes    Tell me about your visit with us. What things went well?  everything went well       We're always looking for ways to make our patients' experiences even better. Do you have recommendations on ways we may improve?  no    Overall were you satisfied with your visit to our practice? yes       I appreciate you taking the time to speak with me today. Is there anything else I can do for you? no      Thank you, and have a great day.

## 2025-03-31 DIAGNOSIS — B37.0 THRUSH: ICD-10-CM

## 2025-03-31 RX ORDER — NYSTATIN 100000 [USP'U]/ML
SUSPENSION ORAL
Qty: 180 ML | Refills: 0 | Status: SHIPPED | OUTPATIENT
Start: 2025-03-31

## 2025-03-31 RX ORDER — VALACYCLOVIR HYDROCHLORIDE 1 G/1
TABLET, FILM COATED ORAL
Qty: 30 TABLET | Refills: 3 | Status: SHIPPED | OUTPATIENT
Start: 2025-03-31

## 2025-04-14 RX ORDER — TOPIRAMATE 200 MG/1
1 CAPSULE, EXTENDED RELEASE ORAL NIGHTLY
Qty: 90 EACH | Refills: 3 | Status: SHIPPED | OUTPATIENT
Start: 2025-04-14

## 2025-04-21 ENCOUNTER — RESULTS FOLLOW-UP (OUTPATIENT)
Dept: PRIMARY CARE CLINIC | Age: 75
End: 2025-04-21

## 2025-04-21 ENCOUNTER — OFFICE VISIT (OUTPATIENT)
Dept: PRIMARY CARE CLINIC | Age: 75
End: 2025-04-21
Payer: MEDICARE

## 2025-04-21 ENCOUNTER — TELEPHONE (OUTPATIENT)
Dept: PRIMARY CARE CLINIC | Age: 75
End: 2025-04-21

## 2025-04-21 VITALS
TEMPERATURE: 96.9 F | HEIGHT: 65 IN | OXYGEN SATURATION: 96 % | DIASTOLIC BLOOD PRESSURE: 70 MMHG | RESPIRATION RATE: 18 BRPM | BODY MASS INDEX: 24.12 KG/M2 | HEART RATE: 102 BPM | WEIGHT: 144.8 LBS | SYSTOLIC BLOOD PRESSURE: 122 MMHG

## 2025-04-21 DIAGNOSIS — R31.9 HEMATURIA, UNSPECIFIED TYPE: Primary | ICD-10-CM

## 2025-04-21 DIAGNOSIS — N89.8 VAGINAL DRYNESS: ICD-10-CM

## 2025-04-21 DIAGNOSIS — K13.0 ANGULAR CHEILOSIS: ICD-10-CM

## 2025-04-21 LAB
APPEARANCE FLUID: CLEAR
BILIRUBIN, POC: NORMAL
BLOOD URINE, POC: NORMAL
CLARITY, POC: CLEAR
COLOR, POC: YELLOW
GLUCOSE URINE, POC: NORMAL MG/DL
KETONES, POC: NORMAL MG/DL
LEUKOCYTE EST, POC: NORMAL
NITRITE, POC: NORMAL
PH, POC: NORMAL
PROTEIN, POC: NORMAL MG/DL
SPECIFIC GRAVITY, POC: NORMAL
UROBILINOGEN, POC: NORMAL MG/DL

## 2025-04-21 PROCEDURE — 99214 OFFICE O/P EST MOD 30 MIN: CPT | Performed by: FAMILY MEDICINE

## 2025-04-21 PROCEDURE — 3017F COLORECTAL CA SCREEN DOC REV: CPT | Performed by: FAMILY MEDICINE

## 2025-04-21 PROCEDURE — G8427 DOCREV CUR MEDS BY ELIG CLIN: HCPCS | Performed by: FAMILY MEDICINE

## 2025-04-21 PROCEDURE — 1160F RVW MEDS BY RX/DR IN RCRD: CPT | Performed by: FAMILY MEDICINE

## 2025-04-21 PROCEDURE — 1123F ACP DISCUSS/DSCN MKR DOCD: CPT | Performed by: FAMILY MEDICINE

## 2025-04-21 PROCEDURE — 1036F TOBACCO NON-USER: CPT | Performed by: FAMILY MEDICINE

## 2025-04-21 PROCEDURE — 3078F DIAST BP <80 MM HG: CPT | Performed by: FAMILY MEDICINE

## 2025-04-21 PROCEDURE — G8399 PT W/DXA RESULTS DOCUMENT: HCPCS | Performed by: FAMILY MEDICINE

## 2025-04-21 PROCEDURE — 81002 URINALYSIS NONAUTO W/O SCOPE: CPT | Performed by: FAMILY MEDICINE

## 2025-04-21 PROCEDURE — G8420 CALC BMI NORM PARAMETERS: HCPCS | Performed by: FAMILY MEDICINE

## 2025-04-21 PROCEDURE — 1090F PRES/ABSN URINE INCON ASSESS: CPT | Performed by: FAMILY MEDICINE

## 2025-04-21 PROCEDURE — 3074F SYST BP LT 130 MM HG: CPT | Performed by: FAMILY MEDICINE

## 2025-04-21 PROCEDURE — 1159F MED LIST DOCD IN RCRD: CPT | Performed by: FAMILY MEDICINE

## 2025-04-21 RX ORDER — NYSTATIN 100000 U/G
CREAM TOPICAL
Qty: 15 G | Refills: 0 | Status: SHIPPED | OUTPATIENT
Start: 2025-04-21

## 2025-04-21 RX ORDER — FLUCONAZOLE 150 MG/1
150 TABLET ORAL ONCE
Qty: 1 TABLET | Refills: 0 | Status: SHIPPED | OUTPATIENT
Start: 2025-04-21 | End: 2025-04-21

## 2025-04-21 RX ORDER — ESTRADIOL 0.1 MG/G
CREAM VAGINAL
Qty: 1 EACH | Refills: 3 | Status: SHIPPED | OUTPATIENT
Start: 2025-04-21 | End: 2025-04-22 | Stop reason: SDUPTHER

## 2025-04-21 NOTE — PROGRESS NOTES
SUBJECTIVE:    Eyad BUCHANAN Aden is 74 y.o.female who comes in complaining of female pain (Had hematuria and treated for 10 days with ATB she went to walk in clinic) and Glass in bottom of foot   .       HPI:  History of Present Illness  The patient presents for evaluation of a yeast infection, urinary tract infection (UTI), and a suspected plantar wart.    Persistent discomfort in the right lower quadrant, reminiscent of ovulation pain, is reported. Approximately 2 weeks ago, hematuria was noted, which was not investigated for potential renal calculi. An antibiotic regimen was prescribed for a suspected UTI, successfully alleviating the pain. Hormone therapy adherence is reported.    A yeast infection is now suspected secondary to the antibiotic treatment. Excessive salivation during both day and night is reported. Topical medication has been applied to manage the itching, providing some relief.    A foreign body sensation in the foot, believed to be due to a glass splinter, has been present for approximately 2 to 3 weeks. Pain is experienced upon palpation of the area with a finger.         Allergies   Allergen Reactions    Lipitor [Atorvastatin] Diarrhea and Nausea And Vomiting       Social History     Socioeconomic History    Marital status:      Spouse name: Marquis    Number of children: 3    Years of education: None    Highest education level: None   Occupational History    Occupation: Teacher   Tobacco Use    Smoking status: Never    Smokeless tobacco: Never   Vaping Use    Vaping status: Never Used   Substance and Sexual Activity    Alcohol use: No    Drug use: No    Sexual activity: Yes     Partners: Male     Social Drivers of Health     Financial Resource Strain: Low Risk  (5/9/2024)    Overall Financial Resource Strain (CARDIA)     Difficulty of Paying Living Expenses: Not very hard   Food Insecurity: No Food Insecurity (2/25/2025)    Hunger Vital Sign     Worried About Running Out of Food in the Last

## 2025-04-21 NOTE — PATIENT INSTRUCTIONS
We are committed to providing you with the best care possible.   In order to help us achieve these goals please remember to bring all medications, herbal products, and over the counter supplements with you to each visit.     If your provider has ordered testing for you, please be sure to follow up with our office if you have not received results within 7 days after the testing took place.     *If you receive a survey after visiting one of our offices, please take time to share your experience concerning your physician office visit. These surveys are confidential and no health information about you is shared.  We are eager to improve for you and we are counting on your feedback to help make that happen.    Detail Level: Detailed

## 2025-04-22 DIAGNOSIS — N89.8 VAGINAL DRYNESS: ICD-10-CM

## 2025-04-22 RX ORDER — ESTRADIOL 0.1 MG/G
CREAM VAGINAL
Qty: 42.5 G | Refills: 3 | Status: SHIPPED | OUTPATIENT
Start: 2025-04-22

## 2025-05-06 ENCOUNTER — TELEPHONE (OUTPATIENT)
Dept: PRIMARY CARE CLINIC | Age: 75
End: 2025-05-06

## 2025-05-06 DIAGNOSIS — E78.2 MIXED HYPERLIPIDEMIA: ICD-10-CM

## 2025-05-06 DIAGNOSIS — I10 ESSENTIAL HYPERTENSION: Primary | ICD-10-CM

## 2025-05-06 DIAGNOSIS — E55.9 VITAMIN D DEFICIENCY: ICD-10-CM

## 2025-05-06 NOTE — TELEPHONE ENCOUNTER
Pt states she has MAW 05/12 and would like lab orders put in to get done at [facility].

## 2025-05-07 DIAGNOSIS — G43.109 MIGRAINE WITH AURA AND WITHOUT STATUS MIGRAINOSUS, NOT INTRACTABLE: ICD-10-CM

## 2025-05-07 RX ORDER — BUTALBITAL, ACETAMINOPHEN AND CAFFEINE 50; 325; 40 MG/1; MG/1; MG/1
TABLET ORAL
Qty: 45 TABLET | Refills: 0 | Status: SHIPPED | OUTPATIENT
Start: 2025-05-07

## 2025-05-09 DIAGNOSIS — F51.04 CHRONIC INSOMNIA: Primary | ICD-10-CM

## 2025-05-09 RX ORDER — ZOLPIDEM TARTRATE 5 MG/1
TABLET ORAL
Qty: 30 TABLET | Refills: 2 | Status: SHIPPED | OUTPATIENT
Start: 2025-05-09 | End: 2025-06-08

## 2025-05-09 NOTE — TELEPHONE ENCOUNTER
UDS not obtained from our office last patient visit.        PDMP Monitoring:    Last PDMP Andre [place] Reviewed ([place]):  Review User Review Instant Review Result   JAY JIMENES 3/17/2025 11:51 AM Reviewed PDMP [1]     Urine Drug Screenings (1 yr)       POCT Rapid Drug Screen  Collected: 5/9/2024  9:47 AM (Final result)              POCT Rapid Drug Screen  Collected: 5/2/2023 (Final result)              POCT Rapid Drug Screen  Collected: 1/10/2022 (Final result)              POCT Rapid Drug Screen  Collected: 7/27/2020 12:22 PM (Final result)                  Medication Contract and Consent for Opioid Use Documents Filed       Patient Documents       Type of Document Status Date Received Received By Description    Medication Contract Signed 1/23/2018  1:40 PM MYLENE CONDE     Medication Contract Received 8/6/2019  5:19 PM ALBA FREDERICK Medication agreement 7-8-2019    Medication Contract Received 7/28/2020 12:39 PM LANA SANTOS     Medication Contract Received 5/9/2024  5:00 PM LANA SANTOS 5-9-24 Med contract

## 2025-05-22 DIAGNOSIS — E78.2 MIXED HYPERLIPIDEMIA: ICD-10-CM

## 2025-05-22 DIAGNOSIS — I10 ESSENTIAL HYPERTENSION: ICD-10-CM

## 2025-05-22 RX ORDER — ATORVASTATIN CALCIUM 20 MG/1
TABLET, FILM COATED ORAL
Qty: 90 TABLET | Refills: 3 | Status: SHIPPED | OUTPATIENT
Start: 2025-05-22

## 2025-05-22 RX ORDER — LISINOPRIL 20 MG/1
TABLET ORAL
Qty: 90 TABLET | Refills: 3 | Status: SHIPPED | OUTPATIENT
Start: 2025-05-22

## 2025-06-02 DIAGNOSIS — G43.109 MIGRAINE WITH AURA AND WITHOUT STATUS MIGRAINOSUS, NOT INTRACTABLE: ICD-10-CM

## 2025-06-02 RX ORDER — BUTALBITAL, ACETAMINOPHEN AND CAFFEINE 50; 325; 40 MG/1; MG/1; MG/1
TABLET ORAL
Qty: 45 TABLET | Refills: 0 | Status: SHIPPED | OUTPATIENT
Start: 2025-06-02

## 2025-06-09 ENCOUNTER — TELEPHONE (OUTPATIENT)
Dept: NEUROLOGY | Facility: CLINIC | Age: 75
End: 2025-06-09
Payer: MEDICARE

## 2025-06-19 ENCOUNTER — TELEPHONE (OUTPATIENT)
Dept: NEUROLOGY | Facility: CLINIC | Age: 75
End: 2025-06-19
Payer: MEDICARE

## 2025-06-19 NOTE — TELEPHONE ENCOUNTER
Caller: Vincent Gray    Relationship to patient: Self  [phone]     Chief complaint: SCHEDULING CONFLICT    Type of visit: BOTOX    Requested date: FIRST AVAILABLE     If rescheduling, when is the original appointment:   6-25-25 AT 9 AM    Additional notes:    PT NEEDS TO RESCHEDULE HER 6-25-25 BOTOX APPT. PLEASE CALL HER TO SCHEDULE.

## 2025-06-19 NOTE — TELEPHONE ENCOUNTER
Spoke with Mrs. Gray & let her know that we have her message. However, Hugh, Dr. Randolph's MA, will need to review the schedule before I can change it. Hugh will be back next week. Mrs. Gray expressed understanding.

## 2025-06-23 ENCOUNTER — TELEPHONE (OUTPATIENT)
Dept: NEUROLOGY | Facility: CLINIC | Age: 75
End: 2025-06-23

## 2025-06-23 ENCOUNTER — TELEPHONE (OUTPATIENT)
Dept: NEUROLOGY | Facility: CLINIC | Age: 75
End: 2025-06-23
Payer: MEDICARE

## 2025-06-23 NOTE — TELEPHONE ENCOUNTER
Caller: Vincent Gray    Relationship: Self    Best call back number: [phone]     What was the call regarding: THE PT HAS CONFLICTING APPTS DUE TO HUSBANDS SURGERIES AND APPTS. SHE NEEDS TO RESCHEDULE THE BOTOX FROM 06/25/25.     PLEASE REVIEW  THANK YOU

## 2025-06-24 ENCOUNTER — HOSPITAL ENCOUNTER (OUTPATIENT)
Dept: PAIN MANAGEMENT | Age: 75
Discharge: HOME | End: 2025-06-24
Payer: MEDICARE

## 2025-06-24 VITALS
TEMPERATURE: 96.6 F | RESPIRATION RATE: 18 BRPM | OXYGEN SATURATION: 97 % | SYSTOLIC BLOOD PRESSURE: 131 MMHG | DIASTOLIC BLOOD PRESSURE: 77 MMHG | HEART RATE: 78 BPM

## 2025-06-24 DIAGNOSIS — R52 PAIN MANAGEMENT: ICD-10-CM

## 2025-06-24 PROCEDURE — 6360000002 HC RX W HCPCS

## 2025-06-24 PROCEDURE — 2500000003 HC RX 250 WO HCPCS

## 2025-06-24 PROCEDURE — G0260 INJ FOR SACROILIAC JT ANESTH: HCPCS

## 2025-06-24 RX ORDER — BUPIVACAINE HYDROCHLORIDE 5 MG/ML
2 INJECTION, SOLUTION EPIDURAL; INTRACAUDAL; PERINEURAL ONCE
Status: DISCONTINUED | OUTPATIENT
Start: 2025-06-24 | End: 2025-06-26 | Stop reason: HOSPADM

## 2025-06-24 RX ORDER — SODIUM CHLORIDE 9 MG/ML
3 INJECTION, SOLUTION INTRAMUSCULAR; INTRAVENOUS; SUBCUTANEOUS ONCE
Status: DISCONTINUED | OUTPATIENT
Start: 2025-06-24 | End: 2025-06-26 | Stop reason: HOSPADM

## 2025-06-24 RX ORDER — TRIAMCINOLONE ACETONIDE 40 MG/ML
80 INJECTION, SUSPENSION INTRA-ARTICULAR; INTRAMUSCULAR ONCE
Status: DISCONTINUED | OUTPATIENT
Start: 2025-06-24 | End: 2025-06-26 | Stop reason: HOSPADM

## 2025-06-24 RX ORDER — LIDOCAINE HYDROCHLORIDE 10 MG/ML
7 INJECTION, SOLUTION EPIDURAL; INFILTRATION; INTRACAUDAL; PERINEURAL ONCE
Status: DISCONTINUED | OUTPATIENT
Start: 2025-06-24 | End: 2025-06-26 | Stop reason: HOSPADM

## 2025-06-24 ASSESSMENT — PAIN - FUNCTIONAL ASSESSMENT: PAIN_FUNCTIONAL_ASSESSMENT: NONE - DENIES PAIN

## 2025-06-24 NOTE — INTERVAL H&P NOTE
Update History & Physical    The patient's History and Physical  was reviewed with the patient and I examined the patient. There was no change. The surgical site was confirmed by the patient and me.     Plan: The risks, benefits, expected outcome, and alternative to the recommended procedure have been discussed with the patient. Patient understands and wants to proceed with the procedure.     Electronically signed by Evangelist Bah MD on 6/24/2025 at 10:46 AM

## 2025-06-26 ENCOUNTER — TELEPHONE (OUTPATIENT)
Dept: NEUROLOGY | Facility: CLINIC | Age: 75
End: 2025-06-26
Payer: MEDICARE

## 2025-06-26 NOTE — TELEPHONE ENCOUNTER
Detailed voicemail left for pt offering botox appt for tomorrow 6/27 at 1:15pm.     OK FOR HUB TO RELAY and send msg so I can schedule if pt is able.

## 2025-06-27 ENCOUNTER — PROCEDURE VISIT (OUTPATIENT)
Dept: NEUROLOGY | Facility: CLINIC | Age: 75
End: 2025-06-27
Payer: MEDICARE

## 2025-06-27 DIAGNOSIS — G43.719 INTRACTABLE CHRONIC MIGRAINE WITHOUT AURA AND WITHOUT STATUS MIGRAINOSUS: Primary | ICD-10-CM

## 2025-06-27 DIAGNOSIS — G43.109 MIGRAINE WITH AURA AND WITHOUT STATUS MIGRAINOSUS, NOT INTRACTABLE: ICD-10-CM

## 2025-06-27 RX ORDER — BUTALBITAL, ACETAMINOPHEN AND CAFFEINE 50; 325; 40 MG/1; MG/1; MG/1
TABLET ORAL
Qty: 45 TABLET | Refills: 0 | OUTPATIENT
Start: 2025-06-27

## 2025-07-02 ENCOUNTER — OFFICE VISIT (OUTPATIENT)
Dept: PRIMARY CARE CLINIC | Age: 75
End: 2025-07-02
Payer: MEDICARE

## 2025-07-02 VITALS
DIASTOLIC BLOOD PRESSURE: 78 MMHG | HEIGHT: 65 IN | TEMPERATURE: 98 F | HEART RATE: 94 BPM | WEIGHT: 141 LBS | BODY MASS INDEX: 23.49 KG/M2 | SYSTOLIC BLOOD PRESSURE: 118 MMHG | OXYGEN SATURATION: 98 %

## 2025-07-02 DIAGNOSIS — Z51.81 MEDICATION MONITORING ENCOUNTER: ICD-10-CM

## 2025-07-02 DIAGNOSIS — G43.109 MIGRAINE WITH AURA AND WITHOUT STATUS MIGRAINOSUS, NOT INTRACTABLE: ICD-10-CM

## 2025-07-02 DIAGNOSIS — F51.04 CHRONIC INSOMNIA: Primary | ICD-10-CM

## 2025-07-02 DIAGNOSIS — K13.0 ANGULAR CHEILOSIS: ICD-10-CM

## 2025-07-02 LAB
ALCOHOL URINE: NORMAL
AMPHETAMINE SCREEN URINE: NORMAL
BARBITURATE SCREEN URINE: NORMAL
BENZODIAZEPINE SCREEN, URINE: NORMAL
BUPRENORPHINE URINE: NORMAL
COCAINE METABOLITE SCREEN URINE: NORMAL
FENTANYL SCREEN, URINE: NORMAL
GABAPENTIN SCREEN, URINE: NORMAL
MDMA, URINE: NORMAL
METHADONE SCREEN, URINE: NORMAL
METHAMPHETAMINE, URINE: NORMAL
OPIATE SCREEN URINE: NORMAL
OXYCODONE SCREEN URINE: NORMAL
PHENCYCLIDINE SCREEN URINE: NORMAL
PROPOXYPHENE SCREEN, URINE: NORMAL
SYNTHETIC CANNABINOIDS(K2) SCREEN, URINE: NORMAL
THC SCREEN, URINE: NORMAL
TRAMADOL SCREEN URINE: NORMAL
TRICYCLIC ANTIDEPRESSANTS, UR: NORMAL

## 2025-07-02 PROCEDURE — 1090F PRES/ABSN URINE INCON ASSESS: CPT | Performed by: FAMILY MEDICINE

## 2025-07-02 PROCEDURE — 99213 OFFICE O/P EST LOW 20 MIN: CPT | Performed by: FAMILY MEDICINE

## 2025-07-02 PROCEDURE — 1036F TOBACCO NON-USER: CPT | Performed by: FAMILY MEDICINE

## 2025-07-02 PROCEDURE — 1159F MED LIST DOCD IN RCRD: CPT | Performed by: FAMILY MEDICINE

## 2025-07-02 PROCEDURE — 1123F ACP DISCUSS/DSCN MKR DOCD: CPT | Performed by: FAMILY MEDICINE

## 2025-07-02 PROCEDURE — 1160F RVW MEDS BY RX/DR IN RCRD: CPT | Performed by: FAMILY MEDICINE

## 2025-07-02 PROCEDURE — G8399 PT W/DXA RESULTS DOCUMENT: HCPCS | Performed by: FAMILY MEDICINE

## 2025-07-02 PROCEDURE — 3017F COLORECTAL CA SCREEN DOC REV: CPT | Performed by: FAMILY MEDICINE

## 2025-07-02 PROCEDURE — G8427 DOCREV CUR MEDS BY ELIG CLIN: HCPCS | Performed by: FAMILY MEDICINE

## 2025-07-02 PROCEDURE — 3078F DIAST BP <80 MM HG: CPT | Performed by: FAMILY MEDICINE

## 2025-07-02 PROCEDURE — G8420 CALC BMI NORM PARAMETERS: HCPCS | Performed by: FAMILY MEDICINE

## 2025-07-02 PROCEDURE — 80305 DRUG TEST PRSMV DIR OPT OBS: CPT | Performed by: FAMILY MEDICINE

## 2025-07-02 PROCEDURE — 3074F SYST BP LT 130 MM HG: CPT | Performed by: FAMILY MEDICINE

## 2025-07-02 RX ORDER — NYSTATIN 100000 U/G
CREAM TOPICAL
Qty: 15 G | Refills: 0 | Status: SHIPPED | OUTPATIENT
Start: 2025-07-02

## 2025-07-02 RX ORDER — ZOLPIDEM TARTRATE 5 MG/1
5 TABLET ORAL NIGHTLY PRN
Qty: 30 TABLET | Refills: 2 | Status: SHIPPED | OUTPATIENT
Start: 2025-07-02 | End: 2025-09-30

## 2025-07-02 RX ORDER — ZOLPIDEM TARTRATE 5 MG/1
5 TABLET ORAL NIGHTLY PRN
COMMUNITY
End: 2025-07-02 | Stop reason: SDUPTHER

## 2025-07-02 RX ORDER — BUTALBITAL, ACETAMINOPHEN AND CAFFEINE 50; 325; 40 MG/1; MG/1; MG/1
TABLET ORAL
Qty: 45 TABLET | Refills: 2 | Status: SHIPPED | OUTPATIENT
Start: 2025-07-02

## 2025-07-02 ASSESSMENT — ENCOUNTER SYMPTOMS
RESPIRATORY NEGATIVE: 1
GASTROINTESTINAL NEGATIVE: 1

## 2025-07-03 NOTE — PROGRESS NOTES
SUBJECTIVE:    Eyad BUCHANAN Aden is 74 y.o.female who comes in complaining of Medication Refill   .       HPI:  History of Present Illness  The patient is a 74-year-old female who presents for medication refills.  She is on 2 controlled substances and needs refills and her [facility] [place]    She reports an improvement in her condition. She is currently on Fioricet and Ambien, although she rarely uses the latter. She is seeking a refill of her nystatin prescription.  It has helped her cheilosis         Allergies   Allergen Reactions    Lipitor [Atorvastatin] Diarrhea and Nausea And Vomiting       Social History     Socioeconomic History    Marital status:      Spouse name: Marquis    Number of children: 3    Years of education: None    Highest education level: None   Occupational History    Occupation: Teacher   Tobacco Use    Smoking status: Never    Smokeless tobacco: Never   Vaping Use    Vaping status: Never Used   Substance and Sexual Activity    Alcohol use: No    Drug use: No    Sexual activity: Yes     Partners: Male     Social Drivers of Health     Financial Resource Strain: Low Risk  (5/9/2024)    Overall Financial Resource Strain (CARDIA)     Difficulty of Paying Living Expenses: Not very hard   Food Insecurity: No Food Insecurity (2/25/2025)    Hunger Vital Sign     Worried About Running Out of Food in the Last Year: Never true     Ran Out of Food in the Last Year: Never true   Transportation Needs: No Transportation Needs (2/25/2025)    PRAPARE - Transportation     Lack of Transportation (Medical): No     Lack of Transportation (Non-Medical): No   Physical Activity: Insufficiently Active (5/9/2024)    Exercise Vital Sign     Days of Exercise per Week: 4 days     Minutes of Exercise per Session: 30 min    [facility]    Family and Community Support    [facility]    Abuse Screen   Housing

## 2025-07-15 DIAGNOSIS — K21.9 GASTROESOPHAGEAL REFLUX DISEASE WITHOUT ESOPHAGITIS: ICD-10-CM

## 2025-07-15 DIAGNOSIS — B37.0 THRUSH: ICD-10-CM

## 2025-07-15 RX ORDER — OMEPRAZOLE 40 MG/1
40 CAPSULE, DELAYED RELEASE ORAL DAILY
Qty: 90 CAPSULE | Refills: 3 | Status: SHIPPED | OUTPATIENT
Start: 2025-07-15

## 2025-07-15 RX ORDER — NYSTATIN 100000 [USP'U]/ML
SUSPENSION ORAL
Qty: 180 ML | Refills: 1 | Status: SHIPPED | OUTPATIENT
Start: 2025-07-15

## 2025-08-20 ENCOUNTER — TELEPHONE (OUTPATIENT)
Dept: NEUROLOGY | Facility: CLINIC | Age: 75
End: 2025-08-20
Payer: MEDICARE

## 2025-08-25 ENCOUNTER — TELEPHONE (OUTPATIENT)
Dept: NEUROLOGY | Facility: CLINIC | Age: 75
End: 2025-08-25
Payer: MEDICARE

## 2025-08-28 ENCOUNTER — OFFICE VISIT (OUTPATIENT)
Dept: NEUROLOGY | Facility: CLINIC | Age: 75
End: 2025-08-28
Payer: MEDICARE

## 2025-08-28 VITALS
HEART RATE: 76 BPM | BODY MASS INDEX: 23.32 KG/M2 | SYSTOLIC BLOOD PRESSURE: 124 MMHG | HEIGHT: 65 IN | OXYGEN SATURATION: 97 % | WEIGHT: 140 LBS | DIASTOLIC BLOOD PRESSURE: 70 MMHG

## 2025-08-28 DIAGNOSIS — G44.40 MEDICATION OVERUSE HEADACHE: Primary | ICD-10-CM

## 2025-08-28 DIAGNOSIS — Z91.89 AT RISK FOR POLYPHARMACY: ICD-10-CM

## 2025-08-28 DIAGNOSIS — G43.709 CHRONIC MIGRAINE WITHOUT AURA WITHOUT STATUS MIGRAINOSUS, NOT INTRACTABLE: ICD-10-CM

## 2025-08-28 RX ORDER — PREDNISONE 10 MG/1
TABLET ORAL
Qty: 42 TABLET | Refills: 0 | Status: SHIPPED | OUTPATIENT
Start: 2025-08-28 | End: 2025-09-04

## 2025-08-28 RX ORDER — CLARITHROMYCIN 500 MG/1
TABLET ORAL
COMMUNITY
Start: 2025-08-26

## 2025-08-28 RX ORDER — AMOXICILLIN 500 MG/1
CAPSULE ORAL
COMMUNITY
Start: 2025-08-26

## 2025-08-28 RX ORDER — RIZATRIPTAN BENZOATE 10 MG/1
10 TABLET ORAL ONCE AS NEEDED
Qty: 9 TABLET | Refills: 2 | Status: SHIPPED | OUTPATIENT
Start: 2025-08-28 | End: 2026-08-28

## 2025-09-03 DIAGNOSIS — Z12.31 ENCOUNTER FOR SCREENING MAMMOGRAM FOR BREAST CANCER: Primary | ICD-10-CM
